# Patient Record
Sex: FEMALE | Race: WHITE | NOT HISPANIC OR LATINO | Employment: OTHER | URBAN - METROPOLITAN AREA
[De-identification: names, ages, dates, MRNs, and addresses within clinical notes are randomized per-mention and may not be internally consistent; named-entity substitution may affect disease eponyms.]

---

## 2020-01-28 ENCOUNTER — OFFICE VISIT (OUTPATIENT)
Dept: INTERNAL MEDICINE CLINIC | Facility: CLINIC | Age: 75
End: 2020-01-28
Payer: MEDICARE

## 2020-01-28 VITALS
SYSTOLIC BLOOD PRESSURE: 140 MMHG | WEIGHT: 128.4 LBS | DIASTOLIC BLOOD PRESSURE: 82 MMHG | HEIGHT: 59 IN | BODY MASS INDEX: 25.88 KG/M2 | TEMPERATURE: 98.5 F | OXYGEN SATURATION: 95 % | HEART RATE: 64 BPM

## 2020-01-28 DIAGNOSIS — M79.7 FIBROMYALGIA: ICD-10-CM

## 2020-01-28 DIAGNOSIS — Z90.710 H/O: HYSTERECTOMY: ICD-10-CM

## 2020-01-28 DIAGNOSIS — H40.9 GLAUCOMA, UNSPECIFIED GLAUCOMA TYPE, UNSPECIFIED LATERALITY: ICD-10-CM

## 2020-01-28 DIAGNOSIS — I10 ESSENTIAL HYPERTENSION: ICD-10-CM

## 2020-01-28 DIAGNOSIS — K27.9 PUD (PEPTIC ULCER DISEASE): ICD-10-CM

## 2020-01-28 DIAGNOSIS — I65.21 STENOSIS OF RIGHT CAROTID ARTERY: ICD-10-CM

## 2020-01-28 DIAGNOSIS — K21.9 GASTROESOPHAGEAL REFLUX DISEASE WITHOUT ESOPHAGITIS: Primary | ICD-10-CM

## 2020-01-28 DIAGNOSIS — M06.9 RHEUMATOID ARTHRITIS, INVOLVING UNSPECIFIED SITE, UNSPECIFIED RHEUMATOID FACTOR PRESENCE: ICD-10-CM

## 2020-01-28 DIAGNOSIS — F41.9 ANXIETY: ICD-10-CM

## 2020-01-28 DIAGNOSIS — F34.1 DYSTHYMIA: ICD-10-CM

## 2020-01-28 DIAGNOSIS — K86.9 PANCREATIC LESION: ICD-10-CM

## 2020-01-28 PROBLEM — N18.30 CHRONIC RENAL INSUFFICIENCY, STAGE III (MODERATE) (HCC): Status: ACTIVE | Noted: 2019-06-10

## 2020-01-28 PROBLEM — H35.30 MACULAR DEGENERATION: Status: ACTIVE | Noted: 2019-05-28

## 2020-01-28 PROBLEM — M17.10 OSTEOARTHRITIS OF KNEE: Status: ACTIVE | Noted: 2020-01-28

## 2020-01-28 PROBLEM — R25.1 TREMOR: Status: RESOLVED | Noted: 2019-05-02 | Resolved: 2020-01-28

## 2020-01-28 PROBLEM — M47.817 LUMBOSACRAL SPONDYLOSIS WITHOUT MYELOPATHY: Status: ACTIVE | Noted: 2020-01-28

## 2020-01-28 PROBLEM — M17.9 OSTEOARTHRITIS OF KNEE: Status: ACTIVE | Noted: 2020-01-28

## 2020-01-28 PROBLEM — R25.1 TREMOR: Status: ACTIVE | Noted: 2019-05-02

## 2020-01-28 PROBLEM — G25.81 RESTLESS LEGS: Status: ACTIVE | Noted: 2019-05-02

## 2020-01-28 PROBLEM — M16.9 OSTEOARTHRITIS OF HIP: Status: ACTIVE | Noted: 2020-01-28

## 2020-01-28 PROBLEM — H81.13 BENIGN PAROXYSMAL POSITIONAL VERTIGO DUE TO BILATERAL VESTIBULAR DISORDER: Status: ACTIVE | Noted: 2020-01-28

## 2020-01-28 PROBLEM — E78.5 HYPERLIPIDEMIA: Status: ACTIVE | Noted: 2019-05-02

## 2020-01-28 PROBLEM — M81.0 OSTEOPOROSIS: Status: ACTIVE | Noted: 2019-05-28

## 2020-01-28 PROCEDURE — 99204 OFFICE O/P NEW MOD 45 MIN: CPT | Performed by: INTERNAL MEDICINE

## 2020-01-28 RX ORDER — BENAZEPRIL HYDROCHLORIDE 40 MG/1
1 TABLET, FILM COATED ORAL DAILY
COMMUNITY
End: 2020-07-20 | Stop reason: SDUPTHER

## 2020-01-28 RX ORDER — ESCITALOPRAM OXALATE 10 MG/1
1 TABLET ORAL DAILY
COMMUNITY
Start: 2019-11-21 | End: 2020-01-28 | Stop reason: SDUPTHER

## 2020-01-28 RX ORDER — CHLORTHALIDONE 25 MG/1
1 TABLET ORAL DAILY
COMMUNITY
Start: 2019-12-31 | End: 2020-01-28 | Stop reason: SDUPTHER

## 2020-01-28 RX ORDER — METOPROLOL SUCCINATE 50 MG/1
50 TABLET, EXTENDED RELEASE ORAL DAILY
Qty: 90 TABLET | Refills: 1 | Status: SHIPPED | OUTPATIENT
Start: 2020-01-28 | End: 2020-08-18 | Stop reason: SDUPTHER

## 2020-01-28 RX ORDER — CHLORTHALIDONE 25 MG/1
25 TABLET ORAL DAILY
Qty: 90 TABLET | Refills: 1 | Status: SHIPPED | OUTPATIENT
Start: 2020-01-28 | End: 2020-03-31

## 2020-01-28 RX ORDER — UBIDECARENONE 75 MG
1 CAPSULE ORAL DAILY
COMMUNITY
End: 2021-07-07

## 2020-01-28 RX ORDER — ESCITALOPRAM OXALATE 10 MG/1
10 TABLET ORAL DAILY
Qty: 90 TABLET | Refills: 1 | Status: SHIPPED | OUTPATIENT
Start: 2020-01-28 | End: 2020-04-22

## 2020-01-28 RX ORDER — LATANOPROST 50 UG/ML
1 SOLUTION/ DROPS OPHTHALMIC DAILY
COMMUNITY

## 2020-01-28 RX ORDER — METOPROLOL SUCCINATE 50 MG/1
50 TABLET, EXTENDED RELEASE ORAL DAILY
COMMUNITY
End: 2020-01-28 | Stop reason: SDUPTHER

## 2020-01-28 NOTE — ASSESSMENT & PLAN NOTE
Currently stable  Previously evaluated by rheumatology however is no longer on any medications for management of fibromyalgia

## 2020-01-28 NOTE — PROGRESS NOTES
Assessment/Plan:    Dysthymia  Controlled  Continue Lexapro 10 mg daily  Fibromyalgia  Currently stable  Previously evaluated by rheumatology however is no longer on any medications for management of fibromyalgia  Gastroesophageal reflux disease without esophagitis  Currently not on any PPi therapy  Hypertensive disorder  Well controlled  Continue benazepril, chlorthalidone, and metoprolol  Stenosis of right carotid artery  Last carotid duplex was October 2019  Diagnoses and all orders for this visit:    Gastroesophageal reflux disease without esophagitis    PUD (peptic ulcer disease)    Anxiety  -     escitalopram (LEXAPRO) 10 mg tablet; Take 1 tablet (10 mg total) by mouth daily    Dysthymia  -     escitalopram (LEXAPRO) 10 mg tablet; Take 1 tablet (10 mg total) by mouth daily    Essential hypertension  -     Comprehensive metabolic panel; Future  -     CBC; Future  -     chlorthalidone 25 mg tablet; Take 1 tablet (25 mg total) by mouth daily  -     metoprolol succinate (TOPROL-XL) 50 mg 24 hr tablet; Take 1 tablet (50 mg total) by mouth daily    Pancreatic lesion  -     Ambulatory referral to Gastroenterology; Future  -     Comprehensive metabolic panel; Future  -     CBC; Future  -     Lipase; Future    Rheumatoid arthritis, involving unspecified site, unspecified rheumatoid factor presence ([facility] [address] )  -     Cancel: Ambulatory referral to Rheumatology; Future    Fibromyalgia  -     Cancel: Ambulatory referral to Rheumatology; Future    Glaucoma, unspecified glaucoma type, unspecified laterality  -     Ambulatory referral to Ophthalmology; Future    H/O: hysterectomy  -     Ambulatory referral to Gynecology; Future    Stenosis of right carotid artery    Other orders  -     aspirin 81 MG tablet; Take 1 tablet by mouth daily  -     Calcium Carb-Cholecalciferol (CALCIUM 600 + D PO); Take 1 tablet by mouth daily  -     Coenzyme Q10 (CO Q-10) 200 MG CAPS;  Take 1 tablet by mouth daily  - latanoprost (XALATAN) 0 005 % ophthalmic solution; Administer 1 drop to both eyes daily  -     benazepril (LOTENSIN) 40 MG tablet; Take 1 tablet by mouth daily  -     Discontinue: chlorthalidone 25 mg tablet; Take 1 tablet by mouth daily  -     Discontinue: escitalopram (LEXAPRO) 10 mg tablet; Take 1 tablet by mouth daily  -     Multiple Vitamins-Minerals (PRESERVISION/LUTEIN PO); PreserVision Lutein  -     Discontinue: metoprolol succinate (TOPROL-XL) 50 mg 24 hr tablet; Take 50 mg by mouth daily          BMI Counseling: Body mass index is 25 93 kg/m²  The BMI is above normal  Nutrition recommendations include decreasing portion sizes, encouraging healthy choices of fruits and vegetables, decreasing fast food intake, consuming healthier snacks, limiting drinks that contain sugar, moderation in carbohydrate intake, increasing intake of lean protein, reducing intake of saturated and trans fat and reducing intake of cholesterol  Exercise recommendations include moderate physical activity 150 minutes/week and exercising 3-5 times per week  No pharmacotherapy was ordered  Patient referred to PCP due to patient being overweight  Depression Screening and Follow-up Plan: Patient's depression screening was positive with a PHQ-2 score of 0  Clincally patient does not have depression  No treatment is required  Falls Plan of Care: balance, strength, and gait training instructions were provided  Time spent during encounter: 30 minutes (counseling, reviewing medications, and discussing treatment and plan)    Subjective:      Patient ID: Taiwo Berrios is a 66 y o  female  Chief Complaint   Patient presents with    [facility]     New patient appointment     Other     BMI due PHQ (-)       49-year-old female is seen today to establish care    She has a reported past medical history of GERD, peptic ulcer disease, anxiety, depression, history of left breast cancer status post bilateral mastectomy and chemotherapy (2007), osteoporosis, hypertension, hyperlipidemia, chronic kidney disease, hematologic, severe scoliosis, drug-induced restless leg syndrome, and osteoarthritis  She recently moved from [place]  Other than her primary care physician, she used to see pain management, ophthalmologist, neurologist, cardiologist, gynecologist, nephrologist, orthopedic surgery, Gastroenterology (pancreatic lesion), and Oncology  She is up to date with colorectal, breast, and cervical cancer screening  She has no active complaints or concerns at this time  She has been compliant with her medication regimen  She follows a well balanced diet  She is a former smoker (social smoker for 7 years, quit 1979), denies alcohol or illicit drug use  The following portions of the patient's history were reviewed and updated as appropriate: allergies, current medications, past family history, past medical history, past social history, past surgical history and problem list     Review of Systems   Constitutional: Negative for activity change, appetite change, chills, diaphoresis, fatigue and fever  HENT: Negative for congestion, postnasal drip, rhinorrhea, sinus pressure, sinus pain, sneezing and sore throat  Eyes: Negative for visual disturbance  Respiratory: Negative for apnea, cough, choking, chest tightness, shortness of breath and wheezing  Cardiovascular: Negative for chest pain, palpitations and leg swelling  Gastrointestinal: Negative for abdominal distention, abdominal pain, anal bleeding, blood in stool, constipation, diarrhea, nausea and vomiting  Endocrine: Negative for cold intolerance and heat intolerance  Genitourinary: Negative for difficulty urinating, dysuria and hematuria  Musculoskeletal: Negative  Skin: Negative  Neurological: Negative for dizziness, weakness, light-headedness, numbness and headaches  Hematological: Negative for adenopathy     Psychiatric/Behavioral: Negative for agitation, sleep disturbance and suicidal ideas  All other systems reviewed and are negative  History reviewed  No pertinent past medical history  Current Outpatient Medications:     aspirin 81 MG tablet, Take 1 tablet by mouth daily, Disp: , Rfl:     benazepril (LOTENSIN) 40 MG tablet, Take 1 tablet by mouth daily, Disp: , Rfl:     Calcium Carb-Cholecalciferol (CALCIUM 600 + D PO), Take 1 tablet by mouth daily, Disp: , Rfl:     chlorthalidone 25 mg tablet, Take 1 tablet (25 mg total) by mouth daily, Disp: 90 tablet, Rfl: 1    Coenzyme Q10 (CO Q-10) 200 MG CAPS, Take 1 tablet by mouth daily, Disp: , Rfl:     escitalopram (LEXAPRO) 10 mg tablet, Take 1 tablet (10 mg total) by mouth daily, Disp: 90 tablet, Rfl: 1    latanoprost (XALATAN) 0 005 % ophthalmic solution, Administer 1 drop to both eyes daily, Disp: , Rfl:     metoprolol succinate (TOPROL-XL) 50 mg 24 hr tablet, Take 1 tablet (50 mg total) by mouth daily, Disp: 90 tablet, Rfl: 1    Multiple Vitamins-Minerals (PRESERVISION/LUTEIN PO), PreserVision Lutein, Disp: , Rfl:     Allergies   Allergen Reactions    Amoxicillin-Pot Clavulanate Diarrhea    Carbidopa     Cefdinir Diarrhea    Celecoxib Diarrhea    Clindamycin Diarrhea    Denosumab     Dorzolamide Hcl-Timolol Mal     Ibandronic Acid     Lorazepam Hallucinations    Milnacipran     Ropinirole Headache    Rotigotine Rash       Social History   Past Surgical History:   Procedure Laterality Date    BREAST BIOPSY      BREAST SURGERY      GANGLION CYST EXCISION Left     wrist    HIP SURGERY      HYSTERECTOMY      MASTECTOMY Bilateral     PLANTAR FASCIA SURGERY Bilateral 1990    REFRACTIVE SURGERY      ROTATOR CUFF REPAIR Bilateral 2004 2005    TUBAL LIGATION       History reviewed  No pertinent family history      Objective:  /82 (BP Location: Right arm, Patient Position: Sitting, Cuff Size: Adult)   Pulse 64   Temp 98 5 °F (36 9 °C) (Oral)   Ht 4' 11" (1 499 m)   Wt 58 2 kg (128 lb 6 4 oz)   SpO2 95%   BMI 25 93 kg/m²     No results found for this or any previous visit (from the past 1344 hour(s))  Physical Exam   Constitutional: She is oriented to person, place, and time  She appears well-developed and well-nourished  No distress  HENT:   Head: Normocephalic and atraumatic  Eyes: Pupils are equal, round, and reactive to light  Conjunctivae and EOM are normal  Right eye exhibits no discharge  Left eye exhibits no discharge  Neck: Normal range of motion  Neck supple  No JVD present  No thyromegaly present  Cardiovascular: Normal rate, regular rhythm, normal heart sounds and intact distal pulses  Exam reveals no gallop and no friction rub  No murmur heard  Pulmonary/Chest: Effort normal and breath sounds normal  No respiratory distress  She has no wheezes  She has no rales  She exhibits no tenderness  Abdominal: Soft  She exhibits no distension  There is no tenderness  Musculoskeletal: Normal range of motion  She exhibits no edema, tenderness or deformity  Lymphadenopathy:     She has no cervical adenopathy  Neurological: She is alert and oriented to person, place, and time  No cranial nerve deficit  Coordination normal    Skin: Skin is warm and dry  No rash noted  She is not diaphoretic  No erythema  No pallor  Psychiatric: She has a normal mood and affect  Her behavior is normal  Judgment and thought content normal    Nursing note and vitals reviewed

## 2020-01-30 ENCOUNTER — CLINICAL SUPPORT (OUTPATIENT)
Dept: INTERNAL MEDICINE CLINIC | Facility: CLINIC | Age: 75
End: 2020-01-30
Payer: MEDICARE

## 2020-01-30 DIAGNOSIS — I10 ESSENTIAL HYPERTENSION: Primary | ICD-10-CM

## 2020-01-30 DIAGNOSIS — K86.9 PANCREATIC LESION: ICD-10-CM

## 2020-01-30 LAB
ALBUMIN SERPL BCP-MCNC: 3.8 G/DL (ref 3.5–5)
ALP SERPL-CCNC: 106 U/L (ref 46–116)
ALT SERPL W P-5'-P-CCNC: 40 U/L (ref 12–78)
ANION GAP SERPL CALCULATED.3IONS-SCNC: 4 MMOL/L (ref 4–13)
AST SERPL W P-5'-P-CCNC: 19 U/L (ref 5–45)
BILIRUB SERPL-MCNC: 0.42 MG/DL (ref 0.2–1)
BUN SERPL-MCNC: 24 MG/DL (ref 5–25)
CALCIUM SERPL-MCNC: 9.5 MG/DL (ref 8.3–10.1)
CHLORIDE SERPL-SCNC: 107 MMOL/L (ref 100–108)
CO2 SERPL-SCNC: 29 MMOL/L (ref 21–32)
CREAT SERPL-MCNC: 1.02 MG/DL (ref 0.6–1.3)
ERYTHROCYTE [DISTWIDTH] IN BLOOD BY AUTOMATED COUNT: 13.2 % (ref 11.6–15.1)
GFR SERPL CREATININE-BSD FRML MDRD: 54 ML/MIN/1.73SQ M
GLUCOSE P FAST SERPL-MCNC: 86 MG/DL (ref 65–99)
HCT VFR BLD AUTO: 38.4 % (ref 34.8–46.1)
HGB BLD-MCNC: 11.6 G/DL (ref 11.5–15.4)
LIPASE SERPL-CCNC: 206 U/L (ref 73–393)
MCH RBC QN AUTO: 28.9 PG (ref 26.8–34.3)
MCHC RBC AUTO-ENTMCNC: 30.2 G/DL (ref 31.4–37.4)
MCV RBC AUTO: 96 FL (ref 82–98)
PLATELET # BLD AUTO: 303 THOUSANDS/UL (ref 149–390)
PMV BLD AUTO: 11 FL (ref 8.9–12.7)
POTASSIUM SERPL-SCNC: 4.9 MMOL/L (ref 3.5–5.3)
PROT SERPL-MCNC: 6.8 G/DL (ref 6.4–8.2)
RBC # BLD AUTO: 4.02 MILLION/UL (ref 3.81–5.12)
SODIUM SERPL-SCNC: 140 MMOL/L (ref 136–145)
WBC # BLD AUTO: 5.14 THOUSAND/UL (ref 4.31–10.16)

## 2020-01-30 PROCEDURE — 85027 COMPLETE CBC AUTOMATED: CPT | Performed by: INTERNAL MEDICINE

## 2020-01-30 PROCEDURE — 83690 ASSAY OF LIPASE: CPT | Performed by: INTERNAL MEDICINE

## 2020-01-30 PROCEDURE — 36415 COLL VENOUS BLD VENIPUNCTURE: CPT

## 2020-01-30 PROCEDURE — 80053 COMPREHEN METABOLIC PANEL: CPT | Performed by: INTERNAL MEDICINE

## 2020-02-18 ENCOUNTER — OFFICE VISIT (OUTPATIENT)
Dept: INTERNAL MEDICINE CLINIC | Facility: CLINIC | Age: 75
End: 2020-02-18
Payer: MEDICARE

## 2020-02-18 VITALS
SYSTOLIC BLOOD PRESSURE: 126 MMHG | TEMPERATURE: 98.2 F | BODY MASS INDEX: 26 KG/M2 | HEART RATE: 75 BPM | DIASTOLIC BLOOD PRESSURE: 60 MMHG | WEIGHT: 129 LBS | HEIGHT: 59 IN | OXYGEN SATURATION: 95 %

## 2020-02-18 DIAGNOSIS — J06.9 URI, ACUTE: Primary | ICD-10-CM

## 2020-02-18 PROCEDURE — 1036F TOBACCO NON-USER: CPT | Performed by: INTERNAL MEDICINE

## 2020-02-18 PROCEDURE — 99213 OFFICE O/P EST LOW 20 MIN: CPT | Performed by: INTERNAL MEDICINE

## 2020-02-18 PROCEDURE — 3074F SYST BP LT 130 MM HG: CPT | Performed by: INTERNAL MEDICINE

## 2020-02-18 PROCEDURE — 1160F RVW MEDS BY RX/DR IN RCRD: CPT | Performed by: INTERNAL MEDICINE

## 2020-02-18 PROCEDURE — 3008F BODY MASS INDEX DOCD: CPT | Performed by: INTERNAL MEDICINE

## 2020-02-18 PROCEDURE — 3078F DIAST BP <80 MM HG: CPT | Performed by: INTERNAL MEDICINE

## 2020-02-18 RX ORDER — BENZONATATE 100 MG/1
100 CAPSULE ORAL 3 TIMES DAILY PRN
Qty: 20 CAPSULE | Refills: 0 | Status: SHIPPED | OUTPATIENT
Start: 2020-02-18 | End: 2020-03-31

## 2020-02-18 RX ORDER — AZITHROMYCIN 250 MG/1
TABLET, FILM COATED ORAL
Qty: 6 TABLET | Refills: 0 | Status: SHIPPED | OUTPATIENT
Start: 2020-02-18 | End: 2020-02-22

## 2020-02-18 NOTE — PROGRESS NOTES
Assessment/Plan:    URI, acute  Will prescribe azithromycin 500 mg today and 250 mg daily for the next 4 days  Will prescribe benzonatate every 8 hours as needed for cough  Continue over-the-counter decongestants  She is to contact office for worsening symptoms  Diagnoses and all orders for this visit:    URI, acute  -     benzonatate (TESSALON PERLES) 100 mg capsule; Take 1 capsule (100 mg total) by mouth 3 (three) times a day as needed for cough  -     azithromycin (ZITHROMAX) 250 mg tablet; Take 2 tablets today then 1 tablet daily x 4 days                Time spent during encounter: 15 minutes (counseling, reviewing medications, and discussing treatment and plan)    Subjective:      Patient ID: Rowena Lesch is a 66 y o  female  Chief Complaint   Patient presents with    Cough     Patient is here c/o cough, nasal and chest congestion, sore throat and ear pressure  Sx are going on since Saturday  Sx are getting worse  Pt needs to schd  AWV       28-year-old female is seen today with acute URI symptoms since 4 days  URI    This is a new problem  The current episode started in the past 7 days  The problem has been gradually worsening  There has been no fever  Associated symptoms include congestion, coughing, a plugged ear sensation, rhinorrhea, sinus pain and a sore throat  Pertinent negatives include no abdominal pain, chest pain, diarrhea, dysuria, ear pain, headaches, joint pain, joint swelling, nausea, neck pain, rash, sneezing, swollen glands, vomiting or wheezing  She has tried decongestant for the symptoms  The treatment provided mild relief         The following portions of the patient's history were reviewed and updated as appropriate: allergies, current medications, past family history, past medical history, past social history, past surgical history and problem list     Review of Systems   Constitutional: Negative for activity change, appetite change, chills, diaphoresis, fatigue and fever    HENT: Positive for congestion, rhinorrhea, sinus pain and sore throat  Negative for ear pain, postnasal drip, sinus pressure and sneezing  Eyes: Negative for visual disturbance  Respiratory: Positive for cough  Negative for apnea, choking, chest tightness, shortness of breath and wheezing  Cardiovascular: Negative for chest pain, palpitations and leg swelling  Gastrointestinal: Negative for abdominal distention, abdominal pain, anal bleeding, blood in stool, constipation, diarrhea, nausea and vomiting  Endocrine: Negative for cold intolerance and heat intolerance  Genitourinary: Negative for difficulty urinating, dysuria and hematuria  Musculoskeletal: Negative  Negative for joint pain and neck pain  Skin: Negative  Negative for rash  Neurological: Negative for dizziness, weakness, light-headedness, numbness and headaches  Hematological: Negative for adenopathy  Psychiatric/Behavioral: Negative for agitation, sleep disturbance and suicidal ideas  All other systems reviewed and are negative  History reviewed  No pertinent past medical history        Current Outpatient Medications:     aspirin 81 MG tablet, Take 1 tablet by mouth daily, Disp: , Rfl:     benazepril (LOTENSIN) 40 MG tablet, Take 1 tablet by mouth daily, Disp: , Rfl:     Calcium Carb-Cholecalciferol (CALCIUM 600 + D PO), Take 1 tablet by mouth daily, Disp: , Rfl:     chlorthalidone 25 mg tablet, Take 1 tablet (25 mg total) by mouth daily, Disp: 90 tablet, Rfl: 1    Coenzyme Q10 (CO Q-10) 200 MG CAPS, Take 1 tablet by mouth daily, Disp: , Rfl:     escitalopram (LEXAPRO) 10 mg tablet, Take 1 tablet (10 mg total) by mouth daily, Disp: 90 tablet, Rfl: 1    latanoprost (XALATAN) 0 005 % ophthalmic solution, Administer 1 drop to both eyes daily, Disp: , Rfl:     metoprolol succinate (TOPROL-XL) 50 mg 24 hr tablet, Take 1 tablet (50 mg total) by mouth daily, Disp: 90 tablet, Rfl: 1    Multiple Vitamins-Minerals (PRESERVISION/LUTEIN PO), PreserVision Lutein, Disp: , Rfl:     azithromycin (ZITHROMAX) 250 mg tablet, Take 2 tablets today then 1 tablet daily x 4 days, Disp: 6 tablet, Rfl: 0    benzonatate (TESSALON PERLES) 100 mg capsule, Take 1 capsule (100 mg total) by mouth 3 (three) times a day as needed for cough, Disp: 20 capsule, Rfl: 0    Allergies   Allergen Reactions    Dorzolamide Hcl-Timolol Mal Other (See Comments)    Gabapentin Other (See Comments)    Lorazepam Hallucinations    Amlodipine Itching    Amoxicillin-Pot Clavulanate Diarrhea    Buprenorphine Hcl Hallucinations    Carbidopa     Cefdinir Diarrhea    Celecoxib Diarrhea    Clindamycin Diarrhea    Denosumab     Hydrocodone Hallucinations    Hydrocodone-Acetaminophen     Ibandronic Acid     Levodopa     Milnacipran     Pregabalin     Ropinirole Headache    Rosuvastatin Itching    Tramadol Nausea Only    Rotigotine Rash       Social History   Past Surgical History:   Procedure Laterality Date    BREAST BIOPSY      BREAST SURGERY      GANGLION CYST EXCISION Left     wrist    HIP SURGERY      HYSTERECTOMY      MASTECTOMY Bilateral     PLANTAR FASCIA SURGERY Bilateral 1990    REFRACTIVE SURGERY      ROTATOR CUFF REPAIR Bilateral 2004 2005    TUBAL LIGATION       History reviewed  No pertinent family history      Objective:  /60 (BP Location: Left arm, Patient Position: Sitting, Cuff Size: Standard)   Pulse 75   Temp 98 2 °F (36 8 °C) (Oral)   Ht 4' 11" (1 499 m)   Wt 58 5 kg (129 lb)   SpO2 95%   BMI 26 05 kg/m²     Recent Results (from the past 1344 hour(s))   Lipase    Collection Time: 01/30/20  9:33 AM   Result Value Ref Range    Lipase 206 73 - 393 u/L   CBC    Collection Time: 01/30/20  9:33 AM   Result Value Ref Range    WBC 5 14 4 31 - 10 16 Thousand/uL    RBC 4 02 3 81 - 5 12 Million/uL    Hemoglobin 11 6 11 5 - 15 4 g/dL    Hematocrit 38 4 34 8 - 46 1 %    MCV 96 82 - 98 fL    MCH 28 9 26 8 - 34 3 pg    MCHC 30 2 (L) 31 4 - 37 4 g/dL    RDW 13 2 11 6 - 15 1 %    Platelets 475 950 - 507 Thousands/uL    MPV 11 0 8 9 - 12 7 fL   Comprehensive metabolic panel    Collection Time: 01/30/20  9:33 AM   Result Value Ref Range    Sodium 140 136 - 145 mmol/L    Potassium 4 9 3 5 - 5 3 mmol/L    Chloride 107 100 - 108 mmol/L    CO2 29 21 - 32 mmol/L    ANION GAP 4 4 - 13 mmol/L    BUN 24 5 - 25 mg/dL    Creatinine 1 02 0 60 - 1 30 mg/dL    Glucose, Fasting 86 65 - 99 mg/dL    Calcium 9 5 8 3 - 10 1 mg/dL    AST 19 5 - 45 U/L    ALT 40 12 - 78 U/L    Alkaline Phosphatase 106 46 - 116 U/L    Total Protein 6 8 6 4 - 8 2 g/dL    Albumin 3 8 3 5 - 5 0 g/dL    Total Bilirubin 0 42 0 20 - 1 00 mg/dL    eGFR 54 ml/min/1 73sq m            Physical Exam   Constitutional: She is oriented to person, place, and time  She appears well-developed and well-nourished  She appears ill  No distress  HENT:   Head: Normocephalic and atraumatic  Eyes: Pupils are equal, round, and reactive to light  Conjunctivae and EOM are normal  Right eye exhibits no discharge  Left eye exhibits no discharge  Neck: Normal range of motion  Neck supple  No JVD present  No thyromegaly present  Cardiovascular: Normal rate, regular rhythm, normal heart sounds and intact distal pulses  Exam reveals no gallop and no friction rub  No murmur heard  Pulmonary/Chest: Effort normal and breath sounds normal  No respiratory distress  She has no wheezes  She has no rales  She exhibits no tenderness  +productive cough   Abdominal: Soft  She exhibits no distension  There is no tenderness  Musculoskeletal: Normal range of motion  She exhibits no edema, tenderness or deformity  Lymphadenopathy:     She has no cervical adenopathy  Neurological: She is alert and oriented to person, place, and time  No cranial nerve deficit  Coordination normal    Skin: Skin is warm and dry  No rash noted  She is not diaphoretic  No erythema  No pallor     Psychiatric: She has a normal mood and affect  Her behavior is normal  Judgment and thought content normal    Nursing note and vitals reviewed

## 2020-02-18 NOTE — ASSESSMENT & PLAN NOTE
Will prescribe azithromycin 500 mg today and 250 mg daily for the next 4 days  Will prescribe benzonatate every 8 hours as needed for cough  Continue over-the-counter decongestants  She is to contact office for worsening symptoms

## 2020-02-27 ENCOUNTER — ANNUAL EXAM (OUTPATIENT)
Dept: OBGYN CLINIC | Facility: CLINIC | Age: 75
End: 2020-02-27
Payer: MEDICARE

## 2020-02-27 VITALS
HEIGHT: 59 IN | WEIGHT: 129.6 LBS | DIASTOLIC BLOOD PRESSURE: 78 MMHG | BODY MASS INDEX: 26.13 KG/M2 | SYSTOLIC BLOOD PRESSURE: 132 MMHG

## 2020-02-27 DIAGNOSIS — Z01.419 ENCOUNTER FOR ANNUAL ROUTINE GYNECOLOGICAL EXAMINATION: Primary | ICD-10-CM

## 2020-02-27 DIAGNOSIS — Z12.39 BREAST CANCER SCREENING: ICD-10-CM

## 2020-02-27 PROCEDURE — G0101 CA SCREEN;PELVIC/BREAST EXAM: HCPCS | Performed by: OBSTETRICS & GYNECOLOGY

## 2020-02-27 NOTE — PROGRESS NOTES
Assessment/Plan:  Pap smear deferred due to low risk status  Encouraged self-breast examination as well as calcium supplementation  Continue follow-up with breast surgeon  She has been having annual breast MRI  She will continue to follow-up with her primary care physician as scheduled  Reviewed colon cancer screening, colonoscopy 2019 within normal limits  Return to office in 2 years per Medicare recommendations/protocol  No problem-specific Assessment & Plan notes found for this encounter  Diagnoses and all orders for this visit:    Encounter for annual routine gynecological examination    Breast cancer screening    Other orders  -     Cancel: Mammo screening bilateral w 3d & cad; Future  -     MELATONIN PO; Take by mouth          Subjective:      Patient ID: Venkat Hudson is a 66 y o  female  HPI     This is a very pleasant 66-year-old female  ( x3) presents as a new patient for annual well gyn exam   Her gyn history significant for hysterectomy abdominal with right salpingo-oophorectomy in  due to dysfunctional uterine bleeding  Patient has never been on hormone replacement therapy  She was diagnosed with breast cancer in   Her treatment included bilateral mastectomy followed by chemotherapy  This was done in [place]  She also had reconstructive breast surgery  In 2017 she had her right breast implant replaced due to rupture  She has been having annual breast MRIs done through her plastic surgeon  Patient denies any vaginal bleeding or spotting  She is not sexually active  She has been  for approximately 2 years  She does follow up with her family physician every 3-6 months    She did have a colonoscopy  which was normal     The following portions of the patient's history were reviewed and updated as appropriate: allergies, current medications, past family history, past medical history, past social history, past surgical history and problem list     Review of Systems   Constitutional: Negative for fatigue, fever and unexpected weight change  Respiratory: Negative for cough, chest tightness, shortness of breath and wheezing  Cardiovascular: Negative  Negative for chest pain and palpitations  Gastrointestinal: Negative  Negative for abdominal distention, abdominal pain, blood in stool, constipation, diarrhea, nausea and vomiting  Genitourinary: Negative  Negative for difficulty urinating, dyspareunia, dysuria, flank pain, frequency, genital sores, hematuria, pelvic pain, urgency, vaginal bleeding, vaginal discharge and vaginal pain  Skin: Negative for rash  Objective:      /78   Ht 4' 11" (1 499 m)   Wt 58 8 kg (129 lb 9 6 oz)   Breastfeeding No   BMI 26 18 kg/m²          Physical Exam   Constitutional: She appears well-developed and well-nourished  Cardiovascular: Normal rate and regular rhythm  Pulmonary/Chest: Effort normal and breath sounds normal  Right breast exhibits no inverted nipple, no mass, no nipple discharge, no skin change and no tenderness  Left breast exhibits no inverted nipple, no mass, no nipple discharge, no skin change and no tenderness  Abdominal: Soft  Bowel sounds are normal  She exhibits no distension  There is no tenderness  There is no rebound and no guarding  Genitourinary: Vagina normal  There is no lesion on the right labia  There is no lesion on the left labia  Cervix exhibits no discharge and no friability  Right adnexum displays no mass, no tenderness and no fullness  Left adnexum displays no mass, no tenderness and no fullness  No vaginal discharge found  Genitourinary Comments: The vagina is evident of estrogen deficiency  The cervix is surgically absent  The cuff is well-supported  Rectovaginal exam is confirmatory

## 2020-03-16 ENCOUNTER — TELEPHONE (OUTPATIENT)
Dept: INTERNAL MEDICINE CLINIC | Facility: CLINIC | Age: 75
End: 2020-03-16

## 2020-03-16 ENCOUNTER — CONSULT (OUTPATIENT)
Dept: GASTROENTEROLOGY | Facility: MEDICAL CENTER | Age: 75
End: 2020-03-16
Payer: MEDICARE

## 2020-03-16 ENCOUNTER — APPOINTMENT (OUTPATIENT)
Dept: LAB | Facility: MEDICAL CENTER | Age: 75
End: 2020-03-16
Attending: INTERNAL MEDICINE
Payer: MEDICARE

## 2020-03-16 VITALS
BODY MASS INDEX: 26.21 KG/M2 | SYSTOLIC BLOOD PRESSURE: 116 MMHG | DIASTOLIC BLOOD PRESSURE: 74 MMHG | WEIGHT: 130 LBS | TEMPERATURE: 97.2 F | HEIGHT: 59 IN

## 2020-03-16 DIAGNOSIS — K86.9 PANCREATIC LESION: ICD-10-CM

## 2020-03-16 DIAGNOSIS — R19.7 DIARRHEA DUE TO MALABSORPTION: Primary | ICD-10-CM

## 2020-03-16 DIAGNOSIS — K90.9 DIARRHEA DUE TO MALABSORPTION: Primary | ICD-10-CM

## 2020-03-16 LAB
ANION GAP SERPL CALCULATED.3IONS-SCNC: 6 MMOL/L (ref 4–13)
BUN SERPL-MCNC: 24 MG/DL (ref 5–25)
CALCIUM SERPL-MCNC: 9.4 MG/DL (ref 8.3–10.1)
CHLORIDE SERPL-SCNC: 105 MMOL/L (ref 100–108)
CO2 SERPL-SCNC: 29 MMOL/L (ref 21–32)
CREAT SERPL-MCNC: 1.19 MG/DL (ref 0.6–1.3)
GFR SERPL CREATININE-BSD FRML MDRD: 44 ML/MIN/1.73SQ M
GLUCOSE SERPL-MCNC: 91 MG/DL (ref 65–140)
POTASSIUM SERPL-SCNC: 4.1 MMOL/L (ref 3.5–5.3)
SODIUM SERPL-SCNC: 140 MMOL/L (ref 136–145)

## 2020-03-16 PROCEDURE — 99204 OFFICE O/P NEW MOD 45 MIN: CPT | Performed by: INTERNAL MEDICINE

## 2020-03-16 PROCEDURE — 36415 COLL VENOUS BLD VENIPUNCTURE: CPT

## 2020-03-16 PROCEDURE — 80048 BASIC METABOLIC PNL TOTAL CA: CPT

## 2020-03-16 NOTE — TELEPHONE ENCOUNTER
Patient has upcoming dentist shirley on Thursday, she states she needs antibiotics before shirley  She is requesting keflex 500 mg 4 pills 1 hour prior to dentist  Is this something you are able to order  Or do you want her to schedule  ? She is requesting enough for a few visits  States she will be going back for a filling too     She uses [facility]

## 2020-03-16 NOTE — PROGRESS NOTES
[facility] - Outpatient Consultation  Juliana Vogt 66 y o  female MRN: [id number]  Encounter: [id number]          ASSESSMENT AND PLAN:      1  Pancreatic lesion  Patient's previous visit with another gastroenterologist in [place] included for detailed EUS and colonoscopy report but no further evaluation on the gastric fistular or discussion on etiology of chronic diarrhea  No further discussion on chronic pancreatitis either  Patient reported she was never notice jessica the etiology of her pancreatitis  Plan to do an MRI with IV contrast and MRCP to further evaluate pancreatic cyst and gastric fistulae  Patient is likely to require a repeat EUS  However with Covid 19 outbreak, we will plan for this after MRI and decide the timing      - MRI abdomen w wo contrast and mrcp; Future  - Basic metabolic panel; Future    2  Diarrhea due to malabsorption  Labs to rule out infectious etiology  Rule out pancreatic insufficiency  It could be functional as patient reported this has been chronic     - Clostridium difficile toxin by PCR; Future  - Pancreatic elastase, fecal; Future  - Calprotectin,Fecal; Future  - Ova and parasite examination; Future  - Stool Enteric Bacterial Panel by PCR; Future      Follow-up with Dr Dorota Webber  ______________________________________________________________________    HPI:  24-year-old female recently moved from [place] to [place] referred for evaluation of pancreatic cyst   Patient was evaluated by a gastroenterologist in [place] with 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  She was found to have 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 with brownish color fluid draining  The etiology was not clear  She was also found to have a 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  She reported she has chronic diarrhea with intermittent watery bowel movements    She reported 4 to 5 times loose bowel movement in a day for for 5 days followed by 2 days of normal bowel movement  She has recently gained some weight  She was not taking pancreatic enzyme  She underwent a colonoscopy by her endoscopist with findings negative for microscopic colitis   She was also diagnosed with pancreatitis by her previous gastroenterologist   Etiology is unclear  She denies smoking or alcohol use  Her father and her sister both passed away from pancreatic cancer  Her recent labs showed normal CBC and normal liver and kidney functions  REVIEW OF SYSTEMS:    CONSTITUTIONAL: Denies any fever, chills, rigors, and weight loss  HEENT: No earache or tinnitus  Denies hearing loss or visual disturbances  CARDIOVASCULAR: No chest pain or palpitations  RESPIRATORY: Denies any cough, hemoptysis, shortness of breath or dyspnea on exertion  GASTROINTESTINAL: As noted in the History of Present Illness  GENITOURINARY: No problems with urination  Denies any hematuria or dysuria  NEUROLOGIC: No dizziness or vertigo, denies headaches  MUSCULOSKELETAL: Denies any muscle or joint pain  SKIN: Denies skin rashes or itching  ENDOCRINE: Denies excessive thirst  Denies intolerance to heat or cold  PSYCHOSOCIAL: Denies depression or anxiety  Denies any recent memory loss         Historical Information   Past Medical History:   Diagnosis Date    Breast cancer ([facility] [address] ) 2007    Bilateral mastectomy with chemotherapy    Pancreatitis      Past Surgical History:   Procedure Laterality Date    BREAST BIOPSY      BREAST SURGERY      GANGLION CYST EXCISION Left     wrist    HIP SURGERY      HYSTERECTOMY      MASTECTOMY Bilateral 2007    PLANTAR FASCIA SURGERY Bilateral 1990    REFRACTIVE SURGERY      ROTATOR CUFF REPAIR Bilateral 2004 2005    TOTAL ABDOMINAL HYSTERECTOMY      w RSO    TUBAL LIGATION       Social History   Social History     Substance and Sexual Activity   Alcohol Use Not Currently    Comment: rare     Social History     Substance and Sexual Activity   Drug Use Never Social History     Tobacco Use   Smoking Status Former Smoker   Smokeless Tobacco Never Used     No family history on file  Meds/Allergies       Current Outpatient Medications:     aspirin 81 MG tablet    benazepril (LOTENSIN) 40 MG tablet    Calcium Carb-Cholecalciferol (CALCIUM 600 + D PO)    chlorthalidone 25 mg tablet    Coenzyme Q10 (CO Q-10) 200 MG CAPS    escitalopram (LEXAPRO) 10 mg tablet    latanoprost (XALATAN) 0 005 % ophthalmic solution    MELATONIN PO    metoprolol succinate (TOPROL-XL) 50 mg 24 hr tablet    Multiple Vitamins-Minerals (PRESERVISION/LUTEIN PO)    benzonatate (TESSALON PERLES) 100 mg capsule    Allergies   Allergen Reactions    Dorzolamide Hcl-Timolol Mal Other (See Comments)    Gabapentin Other (See Comments)    Lorazepam Hallucinations    Amlodipine Itching    Amoxicillin-Pot Clavulanate Diarrhea    Buprenorphine Hcl Hallucinations    Carbidopa     Cefdinir Diarrhea    Celecoxib Diarrhea    Clindamycin Diarrhea    Denosumab     Hydrocodone Hallucinations    Hydrocodone-Acetaminophen     Ibandronic Acid     Levodopa     Milnacipran     Pregabalin     Ropinirole Headache    Rosuvastatin Itching    Tramadol Nausea Only    Rotigotine Rash           Objective     Blood pressure 116/74, temperature (!) 97 2 °F (36 2 °C), height 4' 11" (1 499 m), weight 59 kg (130 lb), not currently breastfeeding  Body mass index is 26 26 kg/m²  PHYSICAL EXAM:      General Appearance:   Alert, cooperative, no distress   HEENT:   Normocephalic, atraumatic, anicteric      Neck:  Supple, symmetrical, trachea midline   Lungs:   Clear to auscultation bilaterally; no rales, rhonchi or wheezing; respirations unlabored    Heart[de-identified]   Regular rate and rhythm; no murmur, rub, or gallop     Abdomen:   Soft, non-tender, non-distended; normal bowel sounds; no masses, no organomegaly    Genitalia:   Deferred    Rectal:   Deferred    Extremities:  No cyanosis, clubbing or edema  Pulses:  2+ and symmetric    Skin:  No jaundice, rashes, or lesions    Lymph nodes:  No palpable cervical lymphadenopathy        Lab Results:   No visits with results within 1 Day(s) from this visit  Latest known visit with results is:   Clinical Support on 01/30/2020   Component Date Value    Lipase 01/30/2020 206     WBC 01/30/2020 5 14     RBC 01/30/2020 4 02     Hemoglobin 01/30/2020 11 6     Hematocrit 01/30/2020 38 4     MCV 01/30/2020 96     MCH 01/30/2020 28 9     MCHC 01/30/2020 30 2*    RDW 01/30/2020 13 2     Platelets 48/30/5122 303     MPV 01/30/2020 11 0     Sodium 01/30/2020 140     Potassium 01/30/2020 4 9     Chloride 01/30/2020 107     CO2 01/30/2020 29     ANION GAP 01/30/2020 4     BUN 01/30/2020 24     Creatinine 01/30/2020 1 02     Glucose, Fasting 01/30/2020 86     Calcium 01/30/2020 9 5     AST 01/30/2020 19     ALT 01/30/2020 40     Alkaline Phosphatase 01/30/2020 106     Total Protein 01/30/2020 6 8     Albumin 01/30/2020 3 8     Total Bilirubin 01/30/2020 0 42     eGFR 01/30/2020 54          Radiology Results:   No results found

## 2020-03-16 NOTE — TELEPHONE ENCOUNTER
Please contact patient to inquire about the indication for antibiotics prior to undergoing a dental procedure  In reviewing her chart, I do not see an indication for prophylactic antibiotics

## 2020-03-17 ENCOUNTER — TELEPHONE (OUTPATIENT)
Dept: GASTROENTEROLOGY | Facility: MEDICAL CENTER | Age: 75
End: 2020-03-17

## 2020-03-17 NOTE — TELEPHONE ENCOUNTER
----- Message from Rachel Redman MD sent at 3/17/2020  1:22 PM EDT -----  Labs showed normal kidney function test

## 2020-03-17 NOTE — TELEPHONE ENCOUNTER
Patient called back after I left the office on 3/16    I attempted to again contact her this morning and left a message asking that she call me directly this AM

## 2020-03-17 NOTE — TELEPHONE ENCOUNTER
According to current guidelines, prosthetic joints are no longer an indication for antibiotic prophylaxis  No need for antibiotic prophylactic therapy at this time

## 2020-03-17 NOTE — TELEPHONE ENCOUNTER
She had a hip replacement, knee replacement and both shoulders "replaced"  The last surgery was on her left shoulder 7/2019  She said that 1 hour prior to any dental work she was advised to take Keflex 500 mg, 4 capsules for a dose of 2000 mg by her physicians in [place]

## 2020-03-20 ENCOUNTER — HOSPITAL ENCOUNTER (OUTPATIENT)
Dept: MRI IMAGING | Facility: HOSPITAL | Age: 75
Discharge: HOME | End: 2020-03-20
Attending: INTERNAL MEDICINE
Payer: MEDICARE

## 2020-03-20 DIAGNOSIS — K86.9 PANCREATIC LESION: ICD-10-CM

## 2020-03-20 PROCEDURE — A9585 GADOBUTROL INJECTION: HCPCS | Performed by: INTERNAL MEDICINE

## 2020-03-20 PROCEDURE — 74183 MRI ABD W/O CNTR FLWD CNTR: CPT

## 2020-03-20 RX ADMIN — GADOBUTROL 5 ML: 604.72 INJECTION INTRAVENOUS at 14:06

## 2020-03-23 ENCOUNTER — APPOINTMENT (OUTPATIENT)
Dept: LAB | Facility: MEDICAL CENTER | Age: 75
End: 2020-03-23
Attending: INTERNAL MEDICINE
Payer: MEDICARE

## 2020-03-23 DIAGNOSIS — R19.7 DIARRHEA DUE TO MALABSORPTION: ICD-10-CM

## 2020-03-23 DIAGNOSIS — K90.9 DIARRHEA DUE TO MALABSORPTION: ICD-10-CM

## 2020-03-23 PROCEDURE — 83993 ASSAY FOR CALPROTECTIN FECAL: CPT

## 2020-03-23 PROCEDURE — 87177 OVA AND PARASITES SMEARS: CPT

## 2020-03-23 PROCEDURE — 87209 SMEAR COMPLEX STAIN: CPT

## 2020-03-23 PROCEDURE — 82656 EL-1 FECAL QUAL/SEMIQ: CPT

## 2020-03-23 PROCEDURE — 87505 NFCT AGENT DETECTION GI: CPT

## 2020-03-24 LAB
C DIFF TOX GENS STL QL NAA+PROBE: NEGATIVE
CAMPYLOBACTER DNA SPEC NAA+PROBE: NORMAL
O+P STL CONC: NORMAL
SALMONELLA DNA SPEC QL NAA+PROBE: NORMAL
SHIGA TOXIN STX GENE SPEC NAA+PROBE: NORMAL
SHIGELLA DNA SPEC QL NAA+PROBE: NORMAL

## 2020-03-25 DIAGNOSIS — K86.89 PANCREATIC INSUFFICIENCY: Primary | ICD-10-CM

## 2020-03-25 LAB
CALPROTECTIN STL-MCNT: <16 UG/G (ref 0–120)
ELASTASE PANC STL-MCNT: 146 UG ELAST./G

## 2020-03-27 ENCOUNTER — TELEPHONE (OUTPATIENT)
Dept: GASTROENTEROLOGY | Facility: MEDICAL CENTER | Age: 75
End: 2020-03-27

## 2020-03-27 DIAGNOSIS — K86.9 PANCREATIC LESION: Primary | ICD-10-CM

## 2020-03-27 NOTE — TELEPHONE ENCOUNTER
Called pharmacy, copay on zenpep is only $40   Spoke with patient and made her aware, she will pick this medication up

## 2020-03-27 NOTE — TELEPHONE ENCOUNTER
Jayne France - can you find out if co-pay/insurance coverage for zenpep would be better? If so, we can try prescribing that

## 2020-03-27 NOTE — TELEPHONE ENCOUNTER
Called pharmacy, Creon med is covered by insurance, no prior Dicky Foot is needed, but has a copay of $287  Smart GPS Backpack, patient has plan J meaning they will cover 50% of all prescriptions once $250 deductible is met  Has she met this deductible yet? We cannot do anything to bring down the copay of the Creon since insurance does cover, it just has a copay after insurance  Patient also cannot use a coupon savings card since she has Medicare  Called patientlaila to call office to discuss med

## 2020-03-27 NOTE — TELEPHONE ENCOUNTER
Spoke with patient and discussed this with her  She has met her deductible, so insurance has paid 50% of rx  Patient is unable to afford that copay  Are there any alternatives we can prescribe that may be cheaper?

## 2020-03-27 NOTE — TELEPHONE ENCOUNTER
----- Message from Augusta Blackwell MD sent at 3/27/2020 11:07 AM EDT -----  Regarding: FW: prescription  Hi, Sara,    Can you call her and ask her about the insurance  and and her  insurance for preauthorization so the insurance can pay for it?     Thanks    Augusta Blackwell       ----- Message -----  From: Harjinder Mccloud  Sent: 3/26/2020  12:11 PM EDT  To: Augusta Blackwell MD  Subject: prescription                                     Patient went to pharmacy to  script you called in yesterday, too expensive-please call her back

## 2020-03-27 NOTE — TELEPHONE ENCOUNTER
Called provider line [phone], advised that zenpep is covered and will have a copay of around $192, so it is cheaper than creon   Once prescribed, I can call a discount program for zenpep at [phone] to try and get that copay lowered

## 2020-03-30 ENCOUNTER — TELEPHONE (OUTPATIENT)
Dept: OBGYN CLINIC | Facility: MEDICAL CENTER | Age: 75
End: 2020-03-30

## 2020-03-30 NOTE — TELEPHONE ENCOUNTER
Received patient's records for her Right Shoulder  She is new in the area and looking to establish care with a surgeon  Records are scanned into her media for your review

## 2020-03-30 NOTE — TELEPHONE ENCOUNTER
Received this patient's records her right knee and hip  At this time the patient is looking to establish care with a new surgeon, since she is new in the area   All of the records are scanned into her media for review,

## 2020-03-30 NOTE — TELEPHONE ENCOUNTER
Mariposa Oquendo,     This does not seem urgent  I will see her once the coronavirus pandemic has improved and we start seeing patients in the office again  Please make her aware  Thank you

## 2020-03-31 ENCOUNTER — TELEPHONE (OUTPATIENT)
Dept: INTERNAL MEDICINE CLINIC | Facility: CLINIC | Age: 75
End: 2020-03-31

## 2020-03-31 ENCOUNTER — TELEMEDICINE (OUTPATIENT)
Dept: INTERNAL MEDICINE CLINIC | Facility: CLINIC | Age: 75
End: 2020-03-31
Payer: MEDICARE

## 2020-03-31 VITALS — SYSTOLIC BLOOD PRESSURE: 146 MMHG | DIASTOLIC BLOOD PRESSURE: 68 MMHG

## 2020-03-31 DIAGNOSIS — I10 ESSENTIAL HYPERTENSION: Primary | ICD-10-CM

## 2020-03-31 DIAGNOSIS — I10 ESSENTIAL HYPERTENSION: ICD-10-CM

## 2020-03-31 PROBLEM — J06.9 URI, ACUTE: Status: RESOLVED | Noted: 2020-02-18 | Resolved: 2020-03-31

## 2020-03-31 PROCEDURE — 99442 PR PHYS/QHP TELEPHONE EVALUATION 11-20 MIN: CPT | Performed by: INTERNAL MEDICINE

## 2020-03-31 RX ORDER — DOXAZOSIN MESYLATE 1 MG/1
TABLET ORAL
Qty: 90 TABLET | OUTPATIENT
Start: 2020-03-31

## 2020-03-31 RX ORDER — DOXAZOSIN MESYLATE 1 MG/1
1 TABLET ORAL DAILY
Qty: 30 TABLET | Refills: 0 | Status: SHIPPED | OUTPATIENT
Start: 2020-03-31 | End: 2020-04-14

## 2020-03-31 NOTE — ASSESSMENT & PLAN NOTE
Discontinue chlorthalidone due to allergic reaction  She also has an allergic reaction to amlodipine (itchiness)  Continue metoprolol and benazepril, will add doxazosin 1 mg daily  She is to check her blood pressure at home daily for the next 2 weeks and contact me with blood pressure readings

## 2020-03-31 NOTE — PROGRESS NOTES
Virtual Brief Visit    Problem List Items Addressed This Visit        Cardiovascular and Mediastinum    Hypertensive disorder - Primary     Discontinue chlorthalidone due to allergic reaction  She also has an allergic reaction to amlodipine (itchiness)  Continue metoprolol and benazepril, will add doxazosin 1 mg daily  She is to check her blood pressure at home daily for the next 2 weeks and contact me with blood pressure readings  Relevant Medications    doxazosin (CARDURA) 1 mg tablet              Chief Complaint   Patient presents with    Allergic Reaction     Patient c/o allergic reaction from Chlorithalidone  Pt states3 weeks ago she started with the medication and the rash started but pt conituned the medication bc her high blood pressure reading   Virtual Brief Visit       Encounter provider Cali Martines MD    Provider located at [address] [place] [address] [address] [facility] [id number]      Recent Visits  No visits were found meeting these conditions  Showing recent visits within past 7 days and meeting all other requirements     Today's Visits  Date Type Provider Dept   03/31/20 Tamy Pearce MD [facility]   03/31/20 Telephone Cali Martines MD [facility]   Showing today's visits and meeting all other requirements     Future Appointments  Date Type Provider Dept   03/31/20 Telemedicine Cali Martines MD [facility]   Showing future appointments within next 150 days and meeting all other requirements        After connecting through telephone, the patient was identified by name and date of birth  Philip Gao was informed that this is a telemedicine visit and that the visit is being conducted through telephone  My office door was closed  No one else was in the room    She acknowledged consent and understanding of privacy and security of the video platform  The patient has agreed to participate and understands they can discontinue the visit at any time  Patient is aware this is a billable service  Subjective  Janee Lance is a 66 y o  female is contacted today regarding concern for an allergic reaction to chlorthalidone  She was started on chlorthalidone by her cardiologist in December 2019 and since has developed itchiness, intermittent episodes of facial edema/swelling, and is now presenting with a rash  She has not started any new medications since or prior to onset of symptoms  She checks her blood pressure regularly at home and with chlorthalidone has good control with a blood pressure reading of 112/70 and without 146/68  She did perform a trial to evaluate her symptoms while off chlorthalidone and when she stop taking chlorthalidone had resolution of symptoms      Past Medical History:   Diagnosis Date    Breast cancer [facility]) 2007    Bilateral mastectomy with chemotherapy    Pancreatitis        Past Surgical History:   Procedure Laterality Date    BREAST BIOPSY      BREAST SURGERY      GANGLION CYST EXCISION Left     wrist    HIP SURGERY      HYSTERECTOMY      MASTECTOMY Bilateral 2007    PLANTAR FASCIA SURGERY Bilateral 1990    REFRACTIVE SURGERY      ROTATOR CUFF REPAIR Bilateral 2004 2005    TOTAL ABDOMINAL HYSTERECTOMY      w RSO    TUBAL LIGATION         Current Outpatient Medications   Medication Sig Dispense Refill    aspirin 81 MG tablet Take 1 tablet by mouth daily      benazepril (LOTENSIN) 40 MG tablet Take 1 tablet by mouth daily      Calcium Carb-Cholecalciferol (CALCIUM 600 + D PO) Take 1 tablet by mouth daily      Coenzyme Q10 (CO Q-10) 200 MG CAPS Take 1 tablet by mouth daily      MELATONIN PO Take by mouth      metoprolol succinate (TOPROL-XL) 50 mg 24 hr tablet Take 1 tablet (50 mg total) by mouth daily 90 tablet 1    Multiple Vitamins-Minerals (PRESERVISION/LUTEIN PO) PreserVision Lutein      pancrelipase, Lip-Prot-Amyl, (CREON) 12,000 units capsule Take 12,000 units of lipase by mouth 3 (three) times a day with meals 270 capsule 2    Pancrelipase, Lip-Prot-Amyl, (Zenpep) 3000-08744 units CPEP Take 14,000 units of lipase by mouth 3 (three) times a day with meals 90 capsule 4    doxazosin (CARDURA) 1 mg tablet Take 1 tablet (1 mg total) by mouth daily 30 tablet 0    escitalopram (LEXAPRO) 10 mg tablet Take 1 tablet (10 mg total) by mouth daily 90 tablet 1    latanoprost (XALATAN) 0 005 % ophthalmic solution Administer 1 drop to both eyes daily       No current facility-administered medications for this visit  Allergies   Allergen Reactions    Dorzolamide Hcl-Timolol Mal Other (See Comments)    Gabapentin Other (See Comments)    Lorazepam Hallucinations    Amlodipine Itching    Amoxicillin-Pot Clavulanate Diarrhea    Buprenorphine Hcl Hallucinations    Carbidopa     Cefdinir Diarrhea    Celecoxib Diarrhea    Chlorthalidone Edema     Itchiness, facial swelling, rash    Clindamycin Diarrhea    Denosumab     Hydrocodone Hallucinations    Hydrocodone-Acetaminophen     Ibandronic Acid     Levodopa     Milnacipran     Pregabalin     Ropinirole Headache    Rosuvastatin Itching    Tramadol Nausea Only    Rotigotine Rash       Review of Systems   Constitutional: Negative for activity change, appetite change, chills, diaphoresis, fatigue and fever  HENT: Negative for congestion, postnasal drip, rhinorrhea, sinus pressure, sinus pain, sneezing and sore throat  Eyes: Negative for visual disturbance  Respiratory: Negative for apnea, cough, choking, chest tightness, shortness of breath and wheezing  Cardiovascular: Negative for chest pain, palpitations and leg swelling  Gastrointestinal: Negative for abdominal distention, abdominal pain, anal bleeding, blood in stool, constipation, diarrhea, nausea and vomiting     Endocrine: Negative for cold intolerance and heat intolerance  Genitourinary: Negative for difficulty urinating, dysuria and hematuria  Musculoskeletal: Negative  Skin: Positive for rash  Neurological: Negative for dizziness, weakness, light-headedness, numbness and headaches  Hematological: Negative for adenopathy  Psychiatric/Behavioral: Negative for agitation, sleep disturbance and suicidal ideas  All other systems reviewed and are negative  I spent 15 minutes with the patient during this visit    [id number]

## 2020-03-31 NOTE — TELEPHONE ENCOUNTER
Called patient and relayed message  She stated she is okay waiting until the pandemic improves as she is not eager to come out of her home anyway  She appreciated my call and communicating with info

## 2020-03-31 NOTE — TELEPHONE ENCOUNTER
Please call the patient and state that Dr Angel Velazquez is not seeing new patients at this time until COVID- 19 is over  She may schedule to see Angel Velazquez starting in MAY 2020

## 2020-04-09 NOTE — TELEPHONE ENCOUNTER
Patient is scheduled for May12  Advised her it may change due to Covid-19 status  She also had her left shoulder replaced also in July 2019  I will try to obtain those records

## 2020-04-13 ENCOUNTER — TELEPHONE (OUTPATIENT)
Dept: GASTROENTEROLOGY | Facility: AMBULARY SURGERY CENTER | Age: 75
End: 2020-04-13

## 2020-04-14 ENCOUNTER — TELEMEDICINE (OUTPATIENT)
Dept: INTERNAL MEDICINE CLINIC | Facility: CLINIC | Age: 75
End: 2020-04-14
Payer: MEDICARE

## 2020-04-14 VITALS — DIASTOLIC BLOOD PRESSURE: 75 MMHG | SYSTOLIC BLOOD PRESSURE: 161 MMHG | HEART RATE: 71 BPM

## 2020-04-14 DIAGNOSIS — I10 ESSENTIAL HYPERTENSION: ICD-10-CM

## 2020-04-14 DIAGNOSIS — I10 ESSENTIAL HYPERTENSION: Primary | ICD-10-CM

## 2020-04-14 PROCEDURE — 99442 PR PHYS/QHP TELEPHONE EVALUATION 11-20 MIN: CPT | Performed by: INTERNAL MEDICINE

## 2020-04-14 RX ORDER — HYDRALAZINE HYDROCHLORIDE 10 MG/1
TABLET, FILM COATED ORAL
Qty: 270 TABLET | OUTPATIENT
Start: 2020-04-14

## 2020-04-14 RX ORDER — HYDRALAZINE HYDROCHLORIDE 10 MG/1
10 TABLET, FILM COATED ORAL 3 TIMES DAILY
Qty: 30 TABLET | Refills: 0 | Status: SHIPPED | OUTPATIENT
Start: 2020-04-14 | End: 2020-04-22 | Stop reason: SDUPTHER

## 2020-04-22 ENCOUNTER — TELEMEDICINE (OUTPATIENT)
Dept: INTERNAL MEDICINE CLINIC | Facility: CLINIC | Age: 75
End: 2020-04-22
Payer: MEDICARE

## 2020-04-22 VITALS
HEART RATE: 76 BPM | BODY MASS INDEX: 26.21 KG/M2 | WEIGHT: 130 LBS | HEIGHT: 59 IN | DIASTOLIC BLOOD PRESSURE: 81 MMHG | SYSTOLIC BLOOD PRESSURE: 149 MMHG

## 2020-04-22 DIAGNOSIS — I10 ESSENTIAL HYPERTENSION: ICD-10-CM

## 2020-04-22 DIAGNOSIS — F41.9 ANXIETY: Primary | ICD-10-CM

## 2020-04-22 DIAGNOSIS — F34.1 DYSTHYMIA: ICD-10-CM

## 2020-04-22 PROCEDURE — 99442 PR PHYS/QHP TELEPHONE EVALUATION 11-20 MIN: CPT | Performed by: INTERNAL MEDICINE

## 2020-04-22 RX ORDER — CLONIDINE HYDROCHLORIDE 0.1 MG/1
TABLET ORAL
Qty: 180 TABLET | OUTPATIENT
Start: 2020-04-22

## 2020-04-22 RX ORDER — HYDRALAZINE HYDROCHLORIDE 10 MG/1
10 TABLET, FILM COATED ORAL 3 TIMES DAILY
Qty: 42 TABLET | Refills: 0 | Status: SHIPPED | OUTPATIENT
Start: 2020-04-22 | End: 2020-05-01 | Stop reason: SDUPTHER

## 2020-04-22 RX ORDER — HYDRALAZINE HYDROCHLORIDE 10 MG/1
TABLET, FILM COATED ORAL
Qty: 270 TABLET | OUTPATIENT
Start: 2020-04-22

## 2020-04-22 RX ORDER — ESCITALOPRAM OXALATE 20 MG/1
20 TABLET ORAL DAILY
Qty: 30 TABLET | Refills: 3 | Status: SHIPPED | OUTPATIENT
Start: 2020-04-22 | End: 2020-09-01

## 2020-04-22 RX ORDER — CLONIDINE HYDROCHLORIDE 0.1 MG/1
0.1 TABLET ORAL EVERY 12 HOURS SCHEDULED
Qty: 28 TABLET | Refills: 0 | Status: SHIPPED | OUTPATIENT
Start: 2020-04-22 | End: 2020-05-01

## 2020-05-01 ENCOUNTER — TELEMEDICINE (OUTPATIENT)
Dept: INTERNAL MEDICINE CLINIC | Facility: CLINIC | Age: 75
End: 2020-05-01
Payer: MEDICARE

## 2020-05-01 VITALS — SYSTOLIC BLOOD PRESSURE: 153 MMHG | DIASTOLIC BLOOD PRESSURE: 70 MMHG

## 2020-05-01 DIAGNOSIS — I10 ESSENTIAL HYPERTENSION: ICD-10-CM

## 2020-05-01 PROCEDURE — 99213 OFFICE O/P EST LOW 20 MIN: CPT | Performed by: INTERNAL MEDICINE

## 2020-05-01 RX ORDER — HYDRALAZINE HYDROCHLORIDE 25 MG/1
TABLET, FILM COATED ORAL
Qty: 270 TABLET | OUTPATIENT
Start: 2020-05-01

## 2020-05-01 RX ORDER — HYDRALAZINE HYDROCHLORIDE 25 MG/1
25 TABLET, FILM COATED ORAL 3 TIMES DAILY
Qty: 90 TABLET | Refills: 0 | Status: SHIPPED | OUTPATIENT
Start: 2020-05-01 | End: 2020-06-03 | Stop reason: SDUPTHER

## 2020-05-06 ENCOUNTER — TELEMEDICINE (OUTPATIENT)
Dept: INTERNAL MEDICINE CLINIC | Facility: CLINIC | Age: 75
End: 2020-05-06
Payer: MEDICARE

## 2020-05-06 ENCOUNTER — APPOINTMENT (OUTPATIENT)
Dept: URGENT CARE | Age: 75
End: 2020-05-06
Payer: MEDICARE

## 2020-05-06 ENCOUNTER — TELEPHONE (OUTPATIENT)
Dept: OTHER | Facility: OTHER | Age: 75
End: 2020-05-06

## 2020-05-06 DIAGNOSIS — J06.9 VIRAL UPPER RESPIRATORY TRACT INFECTION: ICD-10-CM

## 2020-05-06 DIAGNOSIS — Z20.828 EXPOSURE TO SARS-ASSOCIATED CORONAVIRUS: Primary | ICD-10-CM

## 2020-05-06 PROCEDURE — U0003 INFECTIOUS AGENT DETECTION BY NUCLEIC ACID (DNA OR RNA); SEVERE ACUTE RESPIRATORY SYNDROME CORONAVIRUS 2 (SARS-COV-2) (CORONAVIRUS DISEASE [COVID-19]), AMPLIFIED PROBE TECHNIQUE, MAKING USE OF HIGH THROUGHPUT TECHNOLOGIES AS DESCRIBED BY CMS-2020-01-R: HCPCS | Performed by: INTERNAL MEDICINE

## 2020-05-06 PROCEDURE — 99442 PR PHYS/QHP TELEPHONE EVALUATION 11-20 MIN: CPT | Performed by: INTERNAL MEDICINE

## 2020-05-07 ENCOUNTER — DOCUMENTATION (OUTPATIENT)
Dept: URGENT CARE | Age: 75
End: 2020-05-07

## 2020-05-07 ENCOUNTER — TELEPHONE (OUTPATIENT)
Dept: URGENT CARE | Age: 75
End: 2020-05-07

## 2020-05-08 ENCOUNTER — TELEPHONE (OUTPATIENT)
Dept: GASTROENTEROLOGY | Facility: MEDICAL CENTER | Age: 75
End: 2020-05-08

## 2020-05-08 LAB — SARS-COV-2 RNA SPEC QL NAA+PROBE: NOT DETECTED

## 2020-05-09 ENCOUNTER — TELEPHONE (OUTPATIENT)
Dept: OTHER | Facility: HOSPITAL | Age: 75
End: 2020-05-09

## 2020-05-13 ENCOUNTER — TELEMEDICINE (OUTPATIENT)
Dept: INTERNAL MEDICINE CLINIC | Facility: CLINIC | Age: 75
End: 2020-05-13
Payer: MEDICARE

## 2020-05-13 VITALS — SYSTOLIC BLOOD PRESSURE: 146 MMHG | DIASTOLIC BLOOD PRESSURE: 81 MMHG | HEART RATE: 106 BPM | TEMPERATURE: 99.9 F

## 2020-05-13 DIAGNOSIS — J06.9 URI, ACUTE: Primary | ICD-10-CM

## 2020-05-13 PROCEDURE — 99442 PR PHYS/QHP TELEPHONE EVALUATION 11-20 MIN: CPT | Performed by: INTERNAL MEDICINE

## 2020-05-13 RX ORDER — AZITHROMYCIN 250 MG/1
TABLET, FILM COATED ORAL
Qty: 6 TABLET | Refills: 0 | Status: SHIPPED | OUTPATIENT
Start: 2020-05-13 | End: 2020-05-17

## 2020-05-29 DIAGNOSIS — I10 ESSENTIAL HYPERTENSION: ICD-10-CM

## 2020-06-01 RX ORDER — HYDRALAZINE HYDROCHLORIDE 25 MG/1
TABLET, FILM COATED ORAL
Qty: 90 TABLET | Refills: 0 | OUTPATIENT
Start: 2020-06-01

## 2020-06-02 ENCOUNTER — TELEMEDICINE (OUTPATIENT)
Dept: GASTROENTEROLOGY | Facility: MEDICAL CENTER | Age: 75
End: 2020-06-02
Payer: MEDICARE

## 2020-06-02 ENCOUNTER — TELEPHONE (OUTPATIENT)
Dept: SURGICAL ONCOLOGY | Facility: CLINIC | Age: 75
End: 2020-06-02

## 2020-06-02 DIAGNOSIS — K86.9 PANCREATIC LESION: ICD-10-CM

## 2020-06-02 DIAGNOSIS — K21.9 GASTROESOPHAGEAL REFLUX DISEASE WITHOUT ESOPHAGITIS: Primary | ICD-10-CM

## 2020-06-02 DIAGNOSIS — Z80.0 FAMILY HISTORY OF PANCREATIC CANCER: ICD-10-CM

## 2020-06-02 PROCEDURE — 3079F DIAST BP 80-89 MM HG: CPT | Performed by: INTERNAL MEDICINE

## 2020-06-02 PROCEDURE — 99442 PR PHYS/QHP TELEPHONE EVALUATION 11-20 MIN: CPT | Performed by: INTERNAL MEDICINE

## 2020-06-02 PROCEDURE — 1036F TOBACCO NON-USER: CPT | Performed by: INTERNAL MEDICINE

## 2020-06-02 PROCEDURE — 3077F SYST BP >= 140 MM HG: CPT | Performed by: INTERNAL MEDICINE

## 2020-06-03 ENCOUNTER — HOSPITAL ENCOUNTER (OUTPATIENT)
Dept: NON INVASIVE DIAGNOSTICS | Facility: HOSPITAL | Age: 75
Discharge: HOME | End: 2020-06-03
Attending: INTERNAL MEDICINE
Payer: MEDICARE

## 2020-06-03 ENCOUNTER — OFFICE VISIT (OUTPATIENT)
Dept: INTERNAL MEDICINE CLINIC | Facility: CLINIC | Age: 75
End: 2020-06-03
Payer: MEDICARE

## 2020-06-03 VITALS
SYSTOLIC BLOOD PRESSURE: 138 MMHG | HEIGHT: 59 IN | WEIGHT: 138.6 LBS | DIASTOLIC BLOOD PRESSURE: 76 MMHG | BODY MASS INDEX: 27.94 KG/M2 | RESPIRATION RATE: 18 BRPM | TEMPERATURE: 99.7 F | HEART RATE: 79 BPM | OXYGEN SATURATION: 94 %

## 2020-06-03 DIAGNOSIS — R07.89 ATYPICAL CHEST PAIN: ICD-10-CM

## 2020-06-03 DIAGNOSIS — I10 ESSENTIAL HYPERTENSION: ICD-10-CM

## 2020-06-03 DIAGNOSIS — M79.662 PAIN OF LEFT CALF: Primary | ICD-10-CM

## 2020-06-03 DIAGNOSIS — M79.662 PAIN OF LEFT CALF: ICD-10-CM

## 2020-06-03 PROCEDURE — 3075F SYST BP GE 130 - 139MM HG: CPT | Performed by: INTERNAL MEDICINE

## 2020-06-03 PROCEDURE — 3008F BODY MASS INDEX DOCD: CPT | Performed by: INTERNAL MEDICINE

## 2020-06-03 PROCEDURE — 99213 OFFICE O/P EST LOW 20 MIN: CPT | Performed by: INTERNAL MEDICINE

## 2020-06-03 PROCEDURE — 1036F TOBACCO NON-USER: CPT | Performed by: INTERNAL MEDICINE

## 2020-06-03 PROCEDURE — 93971 EXTREMITY STUDY: CPT

## 2020-06-03 PROCEDURE — 93000 ELECTROCARDIOGRAM COMPLETE: CPT | Performed by: INTERNAL MEDICINE

## 2020-06-03 PROCEDURE — 1160F RVW MEDS BY RX/DR IN RCRD: CPT | Performed by: INTERNAL MEDICINE

## 2020-06-03 PROCEDURE — 3078F DIAST BP <80 MM HG: CPT | Performed by: INTERNAL MEDICINE

## 2020-06-03 RX ORDER — HYDRALAZINE HYDROCHLORIDE 25 MG/1
25 TABLET, FILM COATED ORAL 3 TIMES DAILY
Qty: 90 TABLET | Refills: 3 | Status: SHIPPED | OUTPATIENT
Start: 2020-06-03 | End: 2020-08-18

## 2020-06-04 ENCOUNTER — TELEPHONE (OUTPATIENT)
Dept: INTERNAL MEDICINE CLINIC | Facility: CLINIC | Age: 75
End: 2020-06-04

## 2020-06-04 DIAGNOSIS — M71.22 POPLITEAL CYST, LEFT: Primary | ICD-10-CM

## 2020-06-04 PROCEDURE — 93971 EXTREMITY STUDY: CPT | Performed by: SURGERY

## 2020-06-04 RX ORDER — HYDRALAZINE HYDROCHLORIDE 25 MG/1
TABLET, FILM COATED ORAL
Qty: 270 TABLET | OUTPATIENT
Start: 2020-06-04

## 2020-06-09 ENCOUNTER — TRANSCRIBE ORDERS (OUTPATIENT)
Dept: RADIOLOGY | Facility: HOSPITAL | Age: 75
End: 2020-06-09

## 2020-06-09 ENCOUNTER — HOSPITAL ENCOUNTER (OUTPATIENT)
Dept: RADIOLOGY | Facility: HOSPITAL | Age: 75
Discharge: HOME | End: 2020-06-09
Attending: OTOLARYNGOLOGY
Payer: MEDICARE

## 2020-06-09 DIAGNOSIS — J32.2 CHRONIC ETHMOIDAL SINUSITIS: ICD-10-CM

## 2020-06-09 DIAGNOSIS — J32.0 CHRONIC MAXILLARY SINUSITIS: ICD-10-CM

## 2020-06-09 DIAGNOSIS — J30.1 SEASONAL ALLERGIC RHINITIS DUE TO POLLEN: ICD-10-CM

## 2020-06-09 DIAGNOSIS — G50.1 ATYPICAL FACIAL PAIN: ICD-10-CM

## 2020-06-09 DIAGNOSIS — R44.8 FACIAL PRESSURE: ICD-10-CM

## 2020-06-09 DIAGNOSIS — R09.81 NASAL CONGESTION: ICD-10-CM

## 2020-06-09 DIAGNOSIS — J32.3 CHRONIC SPHENOIDAL SINUSITIS: ICD-10-CM

## 2020-06-09 DIAGNOSIS — J32.1 CHRONIC FRONTAL SINUSITIS: ICD-10-CM

## 2020-06-09 PROCEDURE — 70486 CT MAXILLOFACIAL W/O DYE: CPT

## 2020-06-22 ENCOUNTER — PREP FOR PROCEDURE (OUTPATIENT)
Dept: GASTROENTEROLOGY | Facility: CLINIC | Age: 75
End: 2020-06-22

## 2020-06-22 DIAGNOSIS — Z20.822 ENCOUNTER FOR LABORATORY TESTING FOR COVID-19 VIRUS: Primary | ICD-10-CM

## 2020-06-30 ENCOUNTER — OFFICE VISIT (OUTPATIENT)
Dept: OBGYN CLINIC | Facility: HOSPITAL | Age: 75
End: 2020-06-30
Attending: INTERNAL MEDICINE
Payer: MEDICARE

## 2020-06-30 ENCOUNTER — TELEPHONE (OUTPATIENT)
Dept: GASTROENTEROLOGY | Facility: CLINIC | Age: 75
End: 2020-06-30

## 2020-06-30 ENCOUNTER — HOSPITAL ENCOUNTER (OUTPATIENT)
Dept: RADIOLOGY | Facility: HOSPITAL | Age: 75
Discharge: HOME | End: 2020-06-30
Attending: ORTHOPAEDIC SURGERY

## 2020-06-30 ENCOUNTER — HOSPITAL ENCOUNTER (OUTPATIENT)
Dept: RADIOLOGY | Facility: HOSPITAL | Age: 75
Discharge: HOME | End: 2020-06-30
Attending: ORTHOPAEDIC SURGERY
Payer: MEDICARE

## 2020-06-30 VITALS
HEART RATE: 82 BPM | BODY MASS INDEX: 27.87 KG/M2 | DIASTOLIC BLOOD PRESSURE: 82 MMHG | SYSTOLIC BLOOD PRESSURE: 158 MMHG | HEIGHT: 59 IN

## 2020-06-30 DIAGNOSIS — M23.51 CHRONIC INSTABILITY OF RIGHT KNEE: ICD-10-CM

## 2020-06-30 DIAGNOSIS — M25.562 LEFT KNEE PAIN, UNSPECIFIED CHRONICITY: Primary | ICD-10-CM

## 2020-06-30 DIAGNOSIS — M25.562 LEFT KNEE PAIN, UNSPECIFIED CHRONICITY: ICD-10-CM

## 2020-06-30 DIAGNOSIS — M71.22 POPLITEAL CYST, LEFT: ICD-10-CM

## 2020-06-30 DIAGNOSIS — Z96.651 HISTORY OF RIGHT KNEE JOINT REPLACEMENT: ICD-10-CM

## 2020-06-30 DIAGNOSIS — M25.561 RIGHT KNEE PAIN, UNSPECIFIED CHRONICITY: ICD-10-CM

## 2020-06-30 PROCEDURE — 73562 X-RAY EXAM OF KNEE 3: CPT

## 2020-06-30 PROCEDURE — 99203 OFFICE O/P NEW LOW 30 MIN: CPT | Performed by: ORTHOPAEDIC SURGERY

## 2020-06-30 PROCEDURE — 3077F SYST BP >= 140 MM HG: CPT | Performed by: ORTHOPAEDIC SURGERY

## 2020-06-30 PROCEDURE — 3079F DIAST BP 80-89 MM HG: CPT | Performed by: ORTHOPAEDIC SURGERY

## 2020-06-30 PROCEDURE — 1160F RVW MEDS BY RX/DR IN RCRD: CPT | Performed by: ORTHOPAEDIC SURGERY

## 2020-06-30 PROCEDURE — 1036F TOBACCO NON-USER: CPT | Performed by: ORTHOPAEDIC SURGERY

## 2020-06-30 PROCEDURE — 73560 X-RAY EXAM OF KNEE 1 OR 2: CPT

## 2020-06-30 RX ORDER — FLUOROURACIL 50 MG/G
CREAM TOPICAL
COMMUNITY
Start: 2020-06-16 | End: 2020-08-18 | Stop reason: ALTCHOICE

## 2020-07-01 DIAGNOSIS — Z20.822 ENCOUNTER FOR LABORATORY TESTING FOR COVID-19 VIRUS: ICD-10-CM

## 2020-07-01 PROCEDURE — U0003 INFECTIOUS AGENT DETECTION BY NUCLEIC ACID (DNA OR RNA); SEVERE ACUTE RESPIRATORY SYNDROME CORONAVIRUS 2 (SARS-COV-2) (CORONAVIRUS DISEASE [COVID-19]), AMPLIFIED PROBE TECHNIQUE, MAKING USE OF HIGH THROUGHPUT TECHNOLOGIES AS DESCRIBED BY CMS-2020-01-R: HCPCS

## 2020-07-05 ENCOUNTER — ANESTHESIA EVENT (OUTPATIENT)
Dept: GASTROENTEROLOGY | Facility: HOSPITAL | Age: 75
End: 2020-07-05

## 2020-07-05 NOTE — ANESTHESIA PREPROCEDURE EVALUATION
Review of Systems/Medical History  Patient summary reviewed  Chart reviewed  No history of anesthetic complications     Cardiovascular  Exercise tolerance (METS): >4,  Hyperlipidemia, Hypertension , No past MI ,    Pulmonary  Smoker ex-smoker  , No COPD ,        GI/Hepatic    PUD, Pancreatic problem (h/o chronic pancreatitis with pancreatic cyst; pancreatic insufficiency),        Negative  ROS        Endo/Other  Negative endo/other ROS      GYN    Breast cancer Right mastectomy, left mastectomy and axillary node dissection       Hematology  Negative hematology ROS      Musculoskeletal  Rheumatoid arthritis , Back pain ,        Neurology    Fibromyalgia   Psychology   Anxiety,          EKG 6/3/2020:  RBBB  Sinus rhythm  MRI Abdomen 3/2020:  1 0 x 1 4 cm multilocular cystic mass in the uncinate process of the pancreas  This is most likely a side branch IPMN or a serous cystic neoplasm  Lab Results   Component Value Date    WBC 5 14 01/30/2020    HGB 11 6 01/30/2020    HCT 38 4 01/30/2020    MCV 96 01/30/2020     01/30/2020     Lab Results   Component Value Date    SODIUM 140 03/16/2020    K 4 1 03/16/2020     03/16/2020    CO2 29 03/16/2020    BUN 24 03/16/2020    CREATININE 1 19 03/16/2020    GLUC 91 03/16/2020    CALCIUM 9 4 03/16/2020     No results found for: INR, PROTIME      Physical Exam    Airway    Mallampati score: II  TM Distance: >3 FB  Neck ROM: full     Dental   No notable dental hx implants,     Cardiovascular  Cardiovascular exam normal    Pulmonary  Pulmonary exam normal     Other Findings        Anesthesia Plan  ASA Score- 3     Anesthesia Type- IV sedation with anesthesia with ASA Monitors  Additional Monitors:   Airway Plan:         Plan Factors-    Induction- intravenous  Postoperative Plan-     Informed Consent- Anesthetic plan and risks discussed with patient  I personally reviewed this patient with the CRNA   Discussed and agreed on the Anesthesia Plan with the KATHLEEN Lee

## 2020-07-06 ENCOUNTER — HOSPITAL ENCOUNTER (OUTPATIENT)
Dept: GASTROENTEROLOGY | Facility: HOSPITAL | Age: 75
Setting detail: OUTPATIENT SURGERY
Discharge: HOME | End: 2020-07-06
Attending: INTERNAL MEDICINE | Admitting: INTERNAL MEDICINE
Payer: MEDICARE

## 2020-07-06 ENCOUNTER — ANESTHESIA (OUTPATIENT)
Dept: GASTROENTEROLOGY | Facility: HOSPITAL | Age: 75
End: 2020-07-06

## 2020-07-06 VITALS
RESPIRATION RATE: 18 BRPM | HEART RATE: 66 BPM | DIASTOLIC BLOOD PRESSURE: 51 MMHG | BODY MASS INDEX: 27.71 KG/M2 | WEIGHT: 132 LBS | OXYGEN SATURATION: 96 % | TEMPERATURE: 98.3 F | HEIGHT: 58 IN | SYSTOLIC BLOOD PRESSURE: 109 MMHG

## 2020-07-06 DIAGNOSIS — K86.89 PANCREATIC INSUFFICIENCY: ICD-10-CM

## 2020-07-06 LAB — SARS-COV-2 RNA SPEC QL NAA+PROBE: NOT DETECTED

## 2020-07-06 PROCEDURE — 88305 TISSUE EXAM BY PATHOLOGIST: CPT | Performed by: PATHOLOGY

## 2020-07-06 PROCEDURE — 43237 ENDOSCOPIC US EXAM ESOPH: CPT | Performed by: INTERNAL MEDICINE

## 2020-07-06 PROCEDURE — 88313 SPECIAL STAINS GROUP 2: CPT | Performed by: PATHOLOGY

## 2020-07-06 PROCEDURE — 88342 IMHCHEM/IMCYTCHM 1ST ANTB: CPT | Performed by: PATHOLOGY

## 2020-07-06 PROCEDURE — 1123F ACP DISCUSS/DSCN MKR DOCD: CPT | Performed by: INTERNAL MEDICINE

## 2020-07-06 PROCEDURE — 43239 EGD BIOPSY SINGLE/MULTIPLE: CPT | Performed by: INTERNAL MEDICINE

## 2020-07-06 RX ORDER — LIDOCAINE HYDROCHLORIDE 10 MG/ML
INJECTION, SOLUTION EPIDURAL; INFILTRATION; INTRACAUDAL; PERINEURAL AS NEEDED
Status: DISCONTINUED | OUTPATIENT
Start: 2020-07-06 | End: 2020-07-06 | Stop reason: SURG

## 2020-07-06 RX ORDER — PROPOFOL 10 MG/ML
INJECTION, EMULSION INTRAVENOUS AS NEEDED
Status: DISCONTINUED | OUTPATIENT
Start: 2020-07-06 | End: 2020-07-06 | Stop reason: SURG

## 2020-07-06 RX ORDER — SODIUM CHLORIDE 9 MG/ML
INJECTION, SOLUTION INTRAVENOUS CONTINUOUS PRN
Status: DISCONTINUED | OUTPATIENT
Start: 2020-07-06 | End: 2020-07-06 | Stop reason: SURG

## 2020-07-06 RX ORDER — PROPOFOL 10 MG/ML
INJECTION, EMULSION INTRAVENOUS CONTINUOUS PRN
Status: DISCONTINUED | OUTPATIENT
Start: 2020-07-06 | End: 2020-07-06 | Stop reason: SURG

## 2020-07-06 RX ORDER — LIDOCAINE HYDROCHLORIDE 10 MG/ML
INJECTION, SOLUTION EPIDURAL; INFILTRATION; INTRACAUDAL; PERINEURAL AS NEEDED
Status: DISCONTINUED | OUTPATIENT
Start: 2020-07-06 | End: 2020-07-06

## 2020-07-06 RX ADMIN — PROPOFOL 120 MG: 10 INJECTION, EMULSION INTRAVENOUS at 13:43

## 2020-07-06 RX ADMIN — PHENYLEPHRINE HYDROCHLORIDE 150 MCG: 10 INJECTION INTRAVENOUS at 13:55

## 2020-07-06 RX ADMIN — LIDOCAINE HYDROCHLORIDE 50 MG: 10 INJECTION, SOLUTION EPIDURAL; INFILTRATION; INTRACAUDAL; PERINEURAL at 13:43

## 2020-07-06 RX ADMIN — PROPOFOL 30 MG: 10 INJECTION, EMULSION INTRAVENOUS at 13:50

## 2020-07-06 RX ADMIN — PROPOFOL 20 MG: 10 INJECTION, EMULSION INTRAVENOUS at 13:55

## 2020-07-06 RX ADMIN — PHENYLEPHRINE HYDROCHLORIDE 100 MCG: 10 INJECTION INTRAVENOUS at 14:12

## 2020-07-06 RX ADMIN — SODIUM CHLORIDE: 0.9 INJECTION, SOLUTION INTRAVENOUS at 13:34

## 2020-07-06 RX ADMIN — PROPOFOL 20 MG: 10 INJECTION, EMULSION INTRAVENOUS at 14:00

## 2020-07-06 RX ADMIN — PHENYLEPHRINE HYDROCHLORIDE 20 MCG/MIN: 10 INJECTION INTRAVENOUS at 13:53

## 2020-07-06 RX ADMIN — PROPOFOL 120 MCG/KG/MIN: 10 INJECTION, EMULSION INTRAVENOUS at 13:43

## 2020-07-06 NOTE — ANESTHESIA POSTPROCEDURE EVALUATION
Post-Op Assessment Note    CV Status:  Stable  Pain Score: 0    Pain management: adequate     Mental Status:  Alert and awake   Hydration Status:  Euvolemic   PONV Controlled:  Controlled   Airway Patency:  Patent   Post Op Vitals Reviewed: Yes      Staff: Anesthesiologist           /51 (07/06/20 1428)    Temp      Pulse 69 (07/06/20 1428)   Resp 18 (07/06/20 1428)    SpO2 99 % (07/06/20 1428)

## 2020-07-06 NOTE — ANESTHESIA POSTPROCEDURE EVALUATION
Post-Op Assessment Note    CV Status:  Stable  Pain Score: 0    Pain management: adequate     Mental Status:  Arousable   Hydration Status:  Stable   PONV Controlled:  None   Airway Patency:  Patent   Post Op Vitals Reviewed: Yes      Staff: Anesthesiologist, CRNA           BP   109/51   Temp      Pulse  68   Resp      SpO2   99

## 2020-07-06 NOTE — H&P
History and Physical -  [facility]  Arleen Mercer Torie 66 y o  female MRN: [id number]                  HPI: Robyn Giles is a 66y o  year old female who presents for EUS for assessment of pancreatic cyst      REVIEW OF SYSTEMS: Per the HPI, and otherwise unremarkable      Historical Information   Past Medical History:   Diagnosis Date    Breast cancer ([facility] [address] ) 2007    Bilateral mastectomy with chemotherapy    Hypertension     Hypertensive disorder 5/2/2019    Pancreatitis     Scoliosis      Past Surgical History:   Procedure Laterality Date    BASAL CELL CARCINOMA EXCISION      nose    BREAST BIOPSY      BREAST IMPLANT Bilateral     BREAST SURGERY      CARPAL TUNNEL RELEASE      CATARACT EXTRACTION      DILATION AND CURETTAGE OF UTERUS      GANGLION CYST EXCISION Left     wrist    HERNIA REPAIR      HIP SURGERY      HYSTERECTOMY      KNEE ARTHROSCOPY      KNEE SURGERY      MASTECTOMY Bilateral 2007    NASAL ENDOSCOPY W/ B[place]ON SINUPLASTY      x4     PLANTAR FASCIA SURGERY Bilateral 1990    RADIOFREQUENCY ABLATION      REFRACTIVE SURGERY      ROTATOR CUFF REPAIR Bilateral 2004 2005    TOTAL ABDOMINAL HYSTERECTOMY      w RSO    TRIGGER FINGER RELEASE      TUBAL LIGATION       Social History   Social History     Substance and Sexual Activity   Alcohol Use Not Currently    Comment: rare     Social History     Substance and Sexual Activity   Drug Use Never     Social History     Tobacco Use   Smoking Status Former Smoker   Smokeless Tobacco Never Used     Family History   Problem Relation Age of Onset    No Known Problems Mother     No Known Problems Father        Meds/Allergies       (Not in a hospital admission)    Allergies   Allergen Reactions    Dorzolamide Hcl-Timolol Mal Other (See Comments)    Doxazosin Shortness Of Breath    Gabapentin Other (See Comments)    Lorazepam Hallucinations    Amlodipine Itching    Amoxicillin-Pot Clavulanate Diarrhea    Buprenorphine Hcl Hallucinations    Carbidopa     Cefdinir Diarrhea    Celecoxib Diarrhea    Chlorthalidone Edema     Itchiness, facial swelling, rash    Clindamycin Diarrhea    Clonidine Other (See Comments)     Burning mouth/tongue    Denosumab     Hydrocodone Hallucinations    Hydrocodone-Acetaminophen     Ibandronic Acid     Levodopa     Milnacipran     Pregabalin     Ropinirole Headache    Rosuvastatin Itching    Tramadol Nausea Only    Rotigotine Rash       Objective     Blood pressure 141/65, pulse 67, temperature 98 3 °F (36 8 °C), temperature source Oral, resp  rate 18, height 4' 10" (1 473 m), weight 59 9 kg (132 lb), SpO2 97 %, not currently breastfeeding  PHYSICAL EXAM    Gen: NAD  CV: RRR  CHEST: Clear  ABD: soft, NT/ND  EXT: no edema      ASSESSMENT/PLAN:  This is a 66y o  year old female here for EUS , the patient is stable and optimized for the procedure, we reviewed risk and benefits   Risk include but not limited to infection, bleeding, perforation and missing a lesion    PLAN:   Procedure: EUS

## 2020-07-10 ENCOUNTER — OFFICE VISIT (OUTPATIENT)
Dept: OBGYN CLINIC | Facility: MEDICAL CENTER | Age: 75
End: 2020-07-10
Payer: MEDICARE

## 2020-07-10 ENCOUNTER — APPOINTMENT (OUTPATIENT)
Dept: RADIOLOGY | Facility: MEDICAL CENTER | Age: 75
End: 2020-07-10
Payer: MEDICARE

## 2020-07-10 VITALS — HEIGHT: 58 IN | BODY MASS INDEX: 27.71 KG/M2 | WEIGHT: 132 LBS | TEMPERATURE: 97.8 F

## 2020-07-10 DIAGNOSIS — M25.512 ACUTE PAIN OF LEFT SHOULDER: ICD-10-CM

## 2020-07-10 DIAGNOSIS — M25.512 ACUTE PAIN OF LEFT SHOULDER: Primary | ICD-10-CM

## 2020-07-10 PROCEDURE — 1160F RVW MEDS BY RX/DR IN RCRD: CPT | Performed by: ORTHOPAEDIC SURGERY

## 2020-07-10 PROCEDURE — 73030 X-RAY EXAM OF SHOULDER: CPT

## 2020-07-10 PROCEDURE — 3008F BODY MASS INDEX DOCD: CPT | Performed by: ORTHOPAEDIC SURGERY

## 2020-07-10 PROCEDURE — 3078F DIAST BP <80 MM HG: CPT | Performed by: ORTHOPAEDIC SURGERY

## 2020-07-10 PROCEDURE — 1036F TOBACCO NON-USER: CPT | Performed by: ORTHOPAEDIC SURGERY

## 2020-07-10 PROCEDURE — 3074F SYST BP LT 130 MM HG: CPT | Performed by: ORTHOPAEDIC SURGERY

## 2020-07-10 PROCEDURE — 99213 OFFICE O/P EST LOW 20 MIN: CPT | Performed by: ORTHOPAEDIC SURGERY

## 2020-07-10 NOTE — PROGRESS NOTES
Orthopaedic Surgery - Office Note  Walt Perez (89 y o  female)   : 1945   MRN: [id number]  Encounter Date: 7/10/2020    Chief Complaint   Patient presents with    Left Shoulder - Pain       Assessment / Plan  Left shoulder, stiffness s/p TSA 2019      · Activity as tolerated  · Home exercise program reviewed  · If symptoms do not improve we will consider a bone scan and/or a CT scan to check for implant loosening or stress fracture  Return if symptoms worsen or fail to improve  History of Present Illness  Walt Perez is a 66 y o  right hand dominant female who presents for initial evaluation of left shoulder pain and stiffness  She has recently moved to the area, who reports she had a total shoulder replacement in 2019 in [facility]  She states she is compliant with formal physical therapy until 2018 however she was never able to regain full range of motion and a pain-free status  She complains of intermittent moderate pain in her left shoulder  She is provided with pain relief with the use of ice and Tylenol  She also has hadher right shoulder replaced and she is very happy with her right shoulder range of motion  She does report her pain is a different pain from before her shoulder replacement  She denies any further injury or trauma to her left shoulder  She denies any distal paresthesias      Review of Systems  Pertinent items are noted in HPI  All other systems were reviewed and are negative  Physical Exam  Temp 97 8 °F (36 6 °C)   Ht 4' 10" (1 473 m)   Wt 59 9 kg (132 lb)   BMI 27 59 kg/m²   Cons: Appears well  No apparent distress  Psych: Alert  Oriented x3  Mood and affect normal   Eyes: PERRLA, EOMI  Resp: Normal effort  No audible wheezing or stridor  CV: Palpable pulse  No discernable arrhythmia  No LE edema  Lymph:  No palpable cervical, axillary, or inguinal lymphadenopathy  Skin: Warm  No palpable masses    No visible lesions  Neuro: Normal muscle tone  Normal and symmetric DTR's  Left Shoulder Exam  Alignment / Posture:  Normal shoulder posture  Inspection:  No swelling  Incision healed  Palpation:  acromion tenderness  ROM:  Shoulder   Shoulder ER 30  Shoulder IR gluteus  Strength:  Supraspinatus 4/5  Deltoid 4/5  Stability:  No objective shoulder instability  Tests: (-) Belly press  Neurovascular:  Sensation intact in Ax/R/M/U nerve distributions  2+ radial pulse  Studies Reviewed  XR of left shoulder -  I personally reviewed the x-rays that were obtained in the office today which demonstrated good alignment of left shoulder implant  Procedures  No procedures today  Medical, Surgical, Family, and Social History  The patient's medical history, family history, and social history, were reviewed and updated as appropriate      Past Medical History:   Diagnosis Date    Breast cancer [facility]) 2007    Bilateral mastectomy with chemotherapy    Hypertension     Hypertensive disorder 5/2/2019    Pancreatitis     Scoliosis        Past Surgical History:   Procedure Laterality Date    BASAL CELL CARCINOMA EXCISION      nose    BREAST BIOPSY      BREAST IMPLANT Bilateral     BREAST SURGERY      CARPAL TUNNEL RELEASE      CATARACT EXTRACTION      DILATION AND CURETTAGE OF UTERUS      GANGLION CYST EXCISION Left     wrist    HERNIA REPAIR      HIP SURGERY      HYSTERECTOMY      KNEE ARTHROSCOPY      KNEE SURGERY      MASTECTOMY Bilateral 2007    NASAL ENDOSCOPY W/ BALLON SINUPLASTY      x4     PLANTAR FASCIA SURGERY Bilateral 1990    RADIOFREQUENCY ABLATION      REFRACTIVE SURGERY      ROTATOR CUFF REPAIR Bilateral 2004 2005    TOTAL ABDOMINAL HYSTERECTOMY      w RSO    TRIGGER FINGER RELEASE      TUBAL LIGATION         Family History   Problem Relation Age of Onset    No Known Problems Mother     No Known Problems Father        Social History     Occupational History    Not on file Tobacco Use    Smoking status: Former Smoker    Smokeless tobacco: Never Used   Substance and Sexual Activity    Alcohol use: Not Currently     Comment: rare    Drug use: Never    Sexual activity: Not Currently       Allergies   Allergen Reactions    Dorzolamide Hcl-Timolol Mal Other (See Comments)    Doxazosin Shortness Of Breath    Gabapentin Other (See Comments)    Lorazepam Hallucinations    Amlodipine Itching    Amoxicillin-Pot Clavulanate Diarrhea    Buprenorphine Hcl Hallucinations    Carbidopa     Cefdinir Diarrhea    Celecoxib Diarrhea    Chlorthalidone Edema     Itchiness, facial swelling, rash    Clindamycin Diarrhea    Clonidine Other (See Comments)     Burning mouth/tongue    Denosumab     Hydrocodone Hallucinations    Hydrocodone-Acetaminophen     Ibandronic Acid     Levodopa     Milnacipran     Pregabalin     Ropinirole Headache    Rosuvastatin Itching    Tramadol Nausea Only    Rotigotine Rash         Current Outpatient Medications:     aspirin 81 MG tablet, Take 1 tablet by mouth daily, Disp: , Rfl:     azelastine (ASTELIN) 0 1 % nasal spray, 1 spray into each nostril 2 (two) times a day Use in each nostril as directed, Disp: 1 Bottle, Rfl: 5    benazepril (LOTENSIN) 40 MG tablet, Take 1 tablet by mouth daily, Disp: , Rfl:     Calcium Carb-Cholecalciferol (CALCIUM 600 + D PO), Take 1 tablet by mouth daily, Disp: , Rfl:     cetirizine (ZyrTEC) 10 mg tablet, Take 1 tablet (10 mg total) by mouth daily at bedtime, Disp: 30 tablet, Rfl: 5    Coenzyme Q10 (CO Q-10) 200 MG CAPS, Take 1 tablet by mouth daily, Disp: , Rfl:     escitalopram (LEXAPRO) 20 mg tablet, Take 1 tablet (20 mg total) by mouth daily, Disp: 30 tablet, Rfl: 3    fluorouracil (EFUDEX) 5 % cream, APPLY BID TO SKIN CANCER ON RIGHT CHEEK FOR 6 WEEKS, Disp: , Rfl:     latanoprost (XALATAN) 0 005 % ophthalmic solution, Administer 1 drop to both eyes daily, Disp: , Rfl:     MELATONIN PO, Take by mouth, Disp: , Rfl:     metoprolol succinate (TOPROL-XL) 50 mg 24 hr tablet, Take 1 tablet (50 mg total) by mouth daily, Disp: 90 tablet, Rfl: 1    Multiple Vitamins-Minerals (PRESERVISION/LUTEIN PO), PreserVision Lutein, Disp: , Rfl:     pancrelipase, Lip-Prot-Amyl, (CREON) 12,000 units capsule, Take 24,000 units of lipase by mouth 3 (three) times a day with meals, Disp: 270 capsule, Rfl: 2    Pancrelipase, Lip-Prot-Amyl, (Zenpep) 3000-96329 units CPEP, Take 14,000 units of lipase by mouth 3 (three) times a day with meals, Disp: 90 capsule, Rfl: 4    SIMBRINZA 1-0 2 % SUSP, INT 1 GTT IN OU BID   DISCONTINUE COMBIGAN, Disp: , Rfl:     hydrALAZINE (APRESOLINE) 25 mg tablet, Take 1 tablet (25 mg total) by mouth 3 (three) times a day, Disp: 90 tablet, Rfl: 3      Joleen Li    Scribe Attestation    I,:   Ministerio Arias am acting as a scribe while in the presence of the attending physician :        I,:   Katerina Omer MD personally performed the services described in this documentation    as scribed in my presence :

## 2020-07-13 NOTE — H&P (VIEW-ONLY)
[facility]  OTOLARYNGOLOGY - HEAD & NECK SURGERY    Mireille Greene  [id number]  1945    HISTORY & PHYSICAL    Chief Complaint   Patient presents with    Pre-op Exam        History of Present Illness: 78-year-old woman who presents for follow-up  She has a history of sinus problems, and states that she has had multiple procedures down in [place]  She continues to have issues breathing through both sides of her nose  She is scheduled for surgery later this month  In addition, the patient states that she recently noted to have some dizziness and vertigo sensation when she moves her head in a certain way  She believes that this is more towards the right side  She has had this problem in the past, and she states that her ENT in [place] did send her for physical therapy for BPPV and this helps  HISTORICALLY:  78-year-old woman who presents for further evaluation of sinus issues  The patient states that in the 1980s, she is having issues with allergies while living in [place]  She was on allergy shots for about 8 years she states that she did well at that time and moved to [place] in about 2005  The last 4 years that she was in [place], she developed some sinus issues  She underwent a balloon sinuplasty 4 different times  She states that it did help a little bit after each time, but in the past month she has had more problems  She just moved back to [place] in January  Right now she feels very congested with sometimes on and off yellow discolored nasal drainage  The left side is worse  Her smell is okay  No bleeding  She is tried things like Claritin and Mucinex  She has not been doing any steroid spray, because she has some concerns about her glaucoma  She states that her glaucoma right now is somewhat uncontrolled and was just added another eyedrop yesterday  She does not rinse  No other major issues at this time      Allergies   Allergen Reactions    Dorzolamide Hcl-Timolol Mal Other (See Comments)    Doxazosin Shortness Of Breath    Gabapentin Other (See Comments)    Lorazepam Hallucinations    Amlodipine Itching    Amoxicillin-Pot Clavulanate Diarrhea    Buprenorphine Hcl Hallucinations    Carbidopa     Cefdinir Diarrhea    Celecoxib Diarrhea    Chlorthalidone Edema     Itchiness, facial swelling, rash    Clindamycin Diarrhea    Clonidine Other (See Comments)     Burning mouth/tongue    Denosumab     Hydrocodone Hallucinations    Hydrocodone-Acetaminophen     Ibandronic Acid     Levodopa     Milnacipran     Pregabalin     Ropinirole Headache    Rosuvastatin Itching    Tramadol Nausea Only    Rotigotine Rash       Past Medical History:   Diagnosis Date    Breast cancer ([facility] [address] ) 2007    Bilateral mastectomy with chemotherapy    Hypertension     Hypertensive disorder 5/2/2019    Pancreatitis     Scoliosis        Past Surgical History:   Procedure Laterality Date    BASAL CELL CARCINOMA EXCISION      nose    BREAST BIOPSY      BREAST IMPLANT Bilateral     BREAST SURGERY      CARPAL TUNNEL RELEASE      CATARACT EXTRACTION      DILATION AND CURETTAGE OF UTERUS      GANGLION CYST EXCISION Left     wrist    HERNIA REPAIR      HIP SURGERY      HYSTERECTOMY      KNEE ARTHROSCOPY      KNEE SURGERY      MASTECTOMY Bilateral 2007    NASAL ENDOSCOPY W/ BALLON SINUPLASTY      x4     PLANTAR FASCIA SURGERY Bilateral 1990    RADIOFREQUENCY ABLATION      REFRACTIVE SURGERY      ROTATOR CUFF REPAIR Bilateral 2004 2005    TOTAL ABDOMINAL HYSTERECTOMY      w RSO    TRIGGER FINGER RELEASE      TUBAL LIGATION         Family History   Problem Relation Age of Onset    No Known Problems Mother     No Known Problems Father         Social History     Tobacco Use    Smoking status: Former Smoker    Smokeless tobacco: Never Used   Substance Use Topics    Alcohol use: Not Currently     Comment: rare    Drug use: Never       Current Outpatient Medications on File Prior to Visit   Medication Sig Dispense Refill    aspirin 81 MG tablet Take 1 tablet by mouth daily      benazepril (LOTENSIN) 40 MG tablet Take 1 tablet by mouth daily      Calcium Carb-Cholecalciferol (CALCIUM 600 + D PO) Take 1 tablet by mouth daily      escitalopram (LEXAPRO) 20 mg tablet Take 1 tablet (20 mg total) by mouth daily 30 tablet 3    latanoprost (XALATAN) 0 005 % ophthalmic solution Administer 1 drop to both eyes daily      metoprolol succinate (TOPROL-XL) 50 mg 24 hr tablet Take 1 tablet (50 mg total) by mouth daily 90 tablet 1    Multiple Vitamins-Minerals (PRESERVISION/LUTEIN PO) PreserVision Lutein      SIMBRINZA 1-0 2 % SUSP INT 1 GTT IN OU BID  DISCONTINUE COMBIGAN      azelastine (ASTELIN) 0 1 % nasal spray 1 spray into each nostril 2 (two) times a day Use in each nostril as directed (Patient not taking: Reported on 7/13/2020) 1 Bottle 5    cetirizine (ZyrTEC) 10 mg tablet Take 1 tablet (10 mg total) by mouth daily at bedtime (Patient not taking: Reported on 7/13/2020) 30 tablet 5    Coenzyme Q10 (CO Q-10) 200 MG CAPS Take 1 tablet by mouth daily      fluorouracil (EFUDEX) 5 % cream APPLY BID TO SKIN CANCER ON RIGHT CHEEK FOR 6 WEEKS      hydrALAZINE (APRESOLINE) 25 mg tablet Take 1 tablet (25 mg total) by mouth 3 (three) times a day 90 tablet 3    MELATONIN PO Take by mouth      pancrelipase, Lip-Prot-Amyl, (CREON) 12,000 units capsule Take 24,000 units of lipase by mouth 3 (three) times a day with meals (Patient not taking: Reported on 7/13/2020) 270 capsule 2    Pancrelipase, Lip-Prot-Amyl, (Zenpep) 3000-99977 units CPEP Take 14,000 units of lipase by mouth 3 (three) times a day with meals (Patient not taking: Reported on 7/13/2020) 90 capsule 4     No current facility-administered medications on file prior to visit  Review of Systems:  10-point ROS performed  Patient denies fevers or chills    All other systems reviewed and found to be negative unless otherwise noted in the HPI or MA note  Vitals:    07/13/20 1324   Temp: 98 3 °F (36 8 °C)   TempSrc: Temporal   Weight: 59 9 kg (132 lb)   Height: 4' 10" (1 473 m)         General Appearance: No apparent distress    Head: Normocephalic, atraumatic  Face: Symmetric without obvious lesions  Eyes: Conjunctiva clear, extraocular movements are intact  Ears: Pinna normal shape and position  Canals are clear  TMs intact with no middle ear effusion  Nose: External pyramid midline  Mucosa normal, turbinates mildly congested, septum slightly to the left  Oral cavity/Oropharynx: No mucosal lesions, masses, or pharyngeal asymmetry  Neck: No cervical lymphadenopathy or masses appreciated    Skin: Skin warm and dry  Neurological: Cranial nerves II to XII are intact  Respiratory: No stridor or labored breathing  Cardiovascular: Good perfusion of the upper extremities  No cyanosis of the fingers or hands  Psychiatric: Alert and oriented  Data Reviewed: CT scan is reviewed  Patient has edvin bullosa bilaterally  Inferior turbinate hypertrophy is present  Septum is slightly to the right  The patient has a right sphenoid sinusitis with osteitic bone  The contents of the sinus are completely opacified suspicious for fungus  Assessment:  1  Chronic maxillary sinusitis  PAT Covid Screening    CBC and differential    Basic metabolic panel    ECG 12 lead   2  Nasal congestion  PAT Covid Screening    CBC and differential    Basic metabolic panel    ECG 12 lead   3  Chronic ethmoidal sinusitis     4  Chronic frontal sinusitis     5  Chronic sphenoidal sinusitis     6  Seasonal allergic rhinitis due to pollen     7  Facial pressure     8  Atypical facial pain     9  Benign paroxysmal positional vertigo of right ear            Plan:  I reviewed the patient's CT scan with her  It is clear that she has a right chronic sphenoid sinusitis with osteitic bone    This is suspicious for fungus  The patient has had similar findings in the past   I do recommend given its appearance and her symptoms that we perform wide sphenoidotomy including drilling down the face of the sphenoid to evacuate this area  In addition, we will address some of her breathing issues with likely possible septoplasty, bilateral edvin bullectomy, and inferior turbinate reduction  I may need to open up some of the more anterior sinuses to get wide access to the sphenoid sinus wall  She will follow-up at the time of surgery  We will give her a referral to physical therapy for BPPV  Mica Suárez MD  Otolaryngology - Head & Neck Surgery  [facility]      ** Please Note: Dictation voice to text software may have been used in the creation of this document   **

## 2020-07-17 DIAGNOSIS — R09.81 NASAL CONGESTION: ICD-10-CM

## 2020-07-17 DIAGNOSIS — J32.0 CHRONIC MAXILLARY SINUSITIS: ICD-10-CM

## 2020-07-17 PROCEDURE — U0003 INFECTIOUS AGENT DETECTION BY NUCLEIC ACID (DNA OR RNA); SEVERE ACUTE RESPIRATORY SYNDROME CORONAVIRUS 2 (SARS-COV-2) (CORONAVIRUS DISEASE [COVID-19]), AMPLIFIED PROBE TECHNIQUE, MAKING USE OF HIGH THROUGHPUT TECHNOLOGIES AS DESCRIBED BY CMS-2020-01-R: HCPCS

## 2020-07-18 LAB
INPATIENT: NORMAL
SARS-COV-2 RNA SPEC QL NAA+PROBE: NOT DETECTED

## 2020-07-20 ENCOUNTER — CONSULT (OUTPATIENT)
Dept: INTERNAL MEDICINE CLINIC | Facility: CLINIC | Age: 75
End: 2020-07-20
Payer: MEDICARE

## 2020-07-20 VITALS
DIASTOLIC BLOOD PRESSURE: 74 MMHG | BODY MASS INDEX: 28.34 KG/M2 | HEART RATE: 64 BPM | HEIGHT: 58 IN | WEIGHT: 135 LBS | SYSTOLIC BLOOD PRESSURE: 124 MMHG | OXYGEN SATURATION: 96 % | TEMPERATURE: 98.2 F

## 2020-07-20 DIAGNOSIS — H81.13 BENIGN PAROXYSMAL POSITIONAL VERTIGO DUE TO BILATERAL VESTIBULAR DISORDER: ICD-10-CM

## 2020-07-20 DIAGNOSIS — Z96.651 HISTORY OF RIGHT KNEE JOINT REPLACEMENT: ICD-10-CM

## 2020-07-20 DIAGNOSIS — I65.21 STENOSIS OF RIGHT CAROTID ARTERY: ICD-10-CM

## 2020-07-20 DIAGNOSIS — R09.81 NASAL CONGESTION: ICD-10-CM

## 2020-07-20 DIAGNOSIS — M06.9 RHEUMATOID ARTHRITIS, INVOLVING UNSPECIFIED SITE, UNSPECIFIED RHEUMATOID FACTOR PRESENCE: ICD-10-CM

## 2020-07-20 DIAGNOSIS — Z01.818 PREOP EXAMINATION: ICD-10-CM

## 2020-07-20 DIAGNOSIS — F34.1 DYSTHYMIA: ICD-10-CM

## 2020-07-20 DIAGNOSIS — M47.817 LUMBOSACRAL SPONDYLOSIS WITHOUT MYELOPATHY: ICD-10-CM

## 2020-07-20 DIAGNOSIS — F41.9 ANXIETY: ICD-10-CM

## 2020-07-20 DIAGNOSIS — I10 ESSENTIAL HYPERTENSION: Primary | ICD-10-CM

## 2020-07-20 DIAGNOSIS — Z01.818 PRE-OP EVALUATION: ICD-10-CM

## 2020-07-20 DIAGNOSIS — J32.0 CHRONIC MAXILLARY SINUSITIS: ICD-10-CM

## 2020-07-20 DIAGNOSIS — M81.0 AGE-RELATED OSTEOPOROSIS WITHOUT CURRENT PATHOLOGICAL FRACTURE: ICD-10-CM

## 2020-07-20 DIAGNOSIS — H40.9 GLAUCOMA, UNSPECIFIED GLAUCOMA TYPE, UNSPECIFIED LATERALITY: ICD-10-CM

## 2020-07-20 DIAGNOSIS — E78.2 MIXED HYPERLIPIDEMIA: ICD-10-CM

## 2020-07-20 DIAGNOSIS — K21.9 GASTROESOPHAGEAL REFLUX DISEASE WITHOUT ESOPHAGITIS: ICD-10-CM

## 2020-07-20 DIAGNOSIS — K27.9 PUD (PEPTIC ULCER DISEASE): ICD-10-CM

## 2020-07-20 DIAGNOSIS — N18.30 CHRONIC RENAL INSUFFICIENCY, STAGE III (MODERATE) (HCC): ICD-10-CM

## 2020-07-20 DIAGNOSIS — M79.7 FIBROMYALGIA: ICD-10-CM

## 2020-07-20 PROBLEM — R07.89 ATYPICAL CHEST PAIN: Status: RESOLVED | Noted: 2020-06-03 | Resolved: 2020-07-20

## 2020-07-20 PROBLEM — M25.562 LEFT KNEE PAIN: Status: RESOLVED | Noted: 2020-06-30 | Resolved: 2020-07-20

## 2020-07-20 PROBLEM — J06.9 URI, ACUTE: Status: RESOLVED | Noted: 2020-05-06 | Resolved: 2020-07-20

## 2020-07-20 PROBLEM — M79.662 PAIN OF LEFT CALF: Status: RESOLVED | Noted: 2020-06-03 | Resolved: 2020-07-20

## 2020-07-20 PROBLEM — M25.561 RIGHT KNEE PAIN: Status: RESOLVED | Noted: 2020-06-30 | Resolved: 2020-07-20

## 2020-07-20 LAB
ANION GAP SERPL CALCULATED.3IONS-SCNC: 4 MMOL/L (ref 4–13)
BASOPHILS # BLD AUTO: 0.05 THOUSANDS/ΜL (ref 0–0.1)
BASOPHILS NFR BLD AUTO: 1 % (ref 0–1)
BUN SERPL-MCNC: 31 MG/DL (ref 5–25)
CALCIUM SERPL-MCNC: 9.4 MG/DL (ref 8.3–10.1)
CHLORIDE SERPL-SCNC: 106 MMOL/L (ref 100–108)
CO2 SERPL-SCNC: 26 MMOL/L (ref 21–32)
CREAT SERPL-MCNC: 1.16 MG/DL (ref 0.6–1.3)
EOSINOPHIL # BLD AUTO: 0.22 THOUSAND/ΜL (ref 0–0.61)
EOSINOPHIL NFR BLD AUTO: 4 % (ref 0–6)
ERYTHROCYTE [DISTWIDTH] IN BLOOD BY AUTOMATED COUNT: 14.3 % (ref 11.6–15.1)
GFR SERPL CREATININE-BSD FRML MDRD: 46 ML/MIN/1.73SQ M
GLUCOSE SERPL-MCNC: 97 MG/DL (ref 65–140)
HCT VFR BLD AUTO: 36.5 % (ref 34.8–46.1)
HGB BLD-MCNC: 11.2 G/DL (ref 11.5–15.4)
IMM GRANULOCYTES # BLD AUTO: 0.02 THOUSAND/UL (ref 0–0.2)
IMM GRANULOCYTES NFR BLD AUTO: 0 % (ref 0–2)
LYMPHOCYTES # BLD AUTO: 0.94 THOUSANDS/ΜL (ref 0.6–4.47)
LYMPHOCYTES NFR BLD AUTO: 15 % (ref 14–44)
MCH RBC QN AUTO: 29 PG (ref 26.8–34.3)
MCHC RBC AUTO-ENTMCNC: 30.7 G/DL (ref 31.4–37.4)
MCV RBC AUTO: 95 FL (ref 82–98)
MONOCYTES # BLD AUTO: 0.66 THOUSAND/ΜL (ref 0.17–1.22)
MONOCYTES NFR BLD AUTO: 11 % (ref 4–12)
NEUTROPHILS # BLD AUTO: 4.42 THOUSANDS/ΜL (ref 1.85–7.62)
NEUTS SEG NFR BLD AUTO: 69 % (ref 43–75)
NRBC BLD AUTO-RTO: 0 /100 WBCS
PLATELET # BLD AUTO: 287 THOUSANDS/UL (ref 149–390)
PMV BLD AUTO: 10.4 FL (ref 8.9–12.7)
POTASSIUM SERPL-SCNC: 4.8 MMOL/L (ref 3.5–5.3)
RBC # BLD AUTO: 3.86 MILLION/UL (ref 3.81–5.12)
SODIUM SERPL-SCNC: 136 MMOL/L (ref 136–145)
WBC # BLD AUTO: 6.31 THOUSAND/UL (ref 4.31–10.16)

## 2020-07-20 PROCEDURE — 85025 COMPLETE CBC W/AUTO DIFF WBC: CPT | Performed by: INTERNAL MEDICINE

## 2020-07-20 PROCEDURE — 93000 ELECTROCARDIOGRAM COMPLETE: CPT | Performed by: INTERNAL MEDICINE

## 2020-07-20 PROCEDURE — 99213 OFFICE O/P EST LOW 20 MIN: CPT | Performed by: INTERNAL MEDICINE

## 2020-07-20 PROCEDURE — 80048 BASIC METABOLIC PNL TOTAL CA: CPT | Performed by: INTERNAL MEDICINE

## 2020-07-20 PROCEDURE — 36415 COLL VENOUS BLD VENIPUNCTURE: CPT | Performed by: INTERNAL MEDICINE

## 2020-07-20 RX ORDER — BENAZEPRIL HYDROCHLORIDE 40 MG/1
40 TABLET, FILM COATED ORAL DAILY
Qty: 90 TABLET | Refills: 1 | Status: SHIPPED | OUTPATIENT
Start: 2020-07-20 | End: 2021-01-11

## 2020-07-20 NOTE — LETTER
Cardiology Pre Operative Clearance      PRE OPERATIVE CARDIAC RISK ASSESSMENT    07/20/20    Leonela Morse  1945  [id number]    Date of Surgery:  07/27/2020    Type of Surgery:  Maxillary Antrostomy    Surgeon:  Dr Sheba Molina    No Cardiac Contraindication for Planned Surgical Procedures    Anticoagulation: no    Physician Comment:  She is low risk for cardiovascular complications for this low risk procedure  No further cardiac testing needed at this time  Electronically Signed:   Jennifer Santoro MD

## 2020-07-20 NOTE — ASSESSMENT & PLAN NOTE
She is low risk for cardiovascular complication this low risk procedure  Will await CBC and BMP  Otherwise, no further testing needed at this time

## 2020-07-20 NOTE — PROGRESS NOTES
Subjective:  Chief Complaint   Patient presents with    Pre-op Exam     Patientis here for pre op clearance for sinus surgery at [facility] at [facility]  Pt knowles not have any new medical concerns  Mireille Greene is a 66y o  year old female who presents to the office today for a preoperative consultation at the request of surgeon Dr Ammon Shirley who plans on performing maxillary Antrostomy on July 27  This consultation is requested for the specific conditions prompting preoperative evaluation (i e  because of potential affect on operative risk)  Planned anesthesia: general  The patient has no known anesthesia issues  Patients bleeding risk: no recent abnormal bleeding  Patient does not have objections to receiving blood products if needed  Patient is able to walk 4 blocks without symptoms  Patient is able to walk up 2 flights of stairs without symptoms  Significant past medical history includes right bundle branch block, GERD, peptic ulcer disease, hypertension, right carotid artery stenosis, rheumatoid arthritis, osteoarthritis, right knee replacement, lumbosacral spondylosis, scoliosis, chronic kidney disease stage 3, fibromyalgia, hyperlipidemia, anxiety, dysthymia  Tobacco use: Former smoker, quit 40 years ago  Alcohol use:  Negative  Illicit drug use:  Negative    Symptoms:   Easy bleeding: no  Easy bruising: no  Frequent nose bleeds: no  Chest pain: no  Cough: no  Dyspnea on exertion:no  Edema: no  Palpitations: no  Wheezing: no    Living situation: Patient lives with herself in a residential home  Her grandson will be caring for her after surgery  shedoes not have post-op concerns       The following portions of the patient's history were reviewed and updated as appropriate: allergies, current medications, past family history, past medical history, past social history, past surgical history and problem list     Review of Systems  Review of Systems   Constitutional: Negative for activity change, appetite change, chills, diaphoresis, fatigue and fever  HENT: Negative for congestion, postnasal drip, rhinorrhea, sinus pressure, sinus pain, sneezing and sore throat  Eyes: Negative for visual disturbance  Respiratory: Negative for apnea, cough, choking, chest tightness, shortness of breath and wheezing  Cardiovascular: Negative for chest pain, palpitations and leg swelling  Gastrointestinal: Negative for abdominal distention, abdominal pain, anal bleeding, blood in stool, constipation, diarrhea, nausea and vomiting  Endocrine: Negative for cold intolerance and heat intolerance  Genitourinary: Negative for difficulty urinating, dysuria and hematuria  Musculoskeletal: Negative  Skin: Negative  Neurological: Negative for dizziness, weakness, light-headedness, numbness and headaches  Hematological: Negative for adenopathy  Psychiatric/Behavioral: Negative for agitation, sleep disturbance and suicidal ideas  All other systems reviewed and are negative          Past Medical History:   Diagnosis Date    Breast cancer [facility]) 2007    Bilateral mastectomy with chemotherapy    Hypertension     Hypertensive disorder 5/2/2019    Pancreatitis     Scoliosis      Past Surgical History:   Procedure Laterality Date    BASAL CELL CARCINOMA EXCISION      nose    BREAST BIOPSY      BREAST IMPLANT Bilateral     BREAST SURGERY      CARPAL TUNNEL RELEASE      CATARACT EXTRACTION      DILATION AND CURETTAGE OF UTERUS      GANGLION CYST EXCISION Left     wrist    HERNIA REPAIR      HIP SURGERY      HYSTERECTOMY      KNEE ARTHROSCOPY      KNEE SURGERY      MASTECTOMY Bilateral 2007    NASAL ENDOSCOPY W/ BALLON SINUPLASTY      x4     PLANTAR FASCIA SURGERY Bilateral 1990    RADIOFREQUENCY ABLATION      REFRACTIVE SURGERY      ROTATOR CUFF REPAIR Bilateral 2004 2005    TOTAL ABDOMINAL HYSTERECTOMY      w RSO    TRIGGER FINGER RELEASE      TUBAL LIGATION Social History     Tobacco Use    Smoking status: Former Smoker    Smokeless tobacco: Never Used   Substance Use Topics    Alcohol use: Not Currently     Comment: rare    Drug use: Never     Family History   Problem Relation Age of Onset    No Known Problems Mother     No Known Problems Father      Dorzolamide hcl-timolol mal; Doxazosin; Gabapentin; Lorazepam; Amlodipine; Amoxicillin-pot clavulanate; Buprenorphine hcl; Carbidopa; Cefdinir; Celecoxib; Chlorthalidone; Clindamycin; Clonidine; Denosumab; Hydrocodone; Hydrocodone-acetaminophen; Ibandronic acid; Levodopa; Milnacipran; Pregabalin; Ropinirole; Rosuvastatin; Tramadol; and Rotigotine  Current Outpatient Medications   Medication Sig Dispense Refill    aspirin 81 MG tablet Take 1 tablet by mouth daily      benazepril (LOTENSIN) 40 MG tablet Take 1 tablet (40 mg total) by mouth daily 90 tablet 1    Calcium Carb-Cholecalciferol (CALCIUM 600 + D PO) Take 1 tablet by mouth daily      Coenzyme Q10 (CO Q-10) 200 MG CAPS Take 1 tablet by mouth daily      escitalopram (LEXAPRO) 20 mg tablet Take 1 tablet (20 mg total) by mouth daily 30 tablet 3    fluorouracil (EFUDEX) 5 % cream APPLY BID TO SKIN CANCER ON RIGHT CHEEK FOR 6 WEEKS      hydrALAZINE (APRESOLINE) 25 mg tablet Take 1 tablet (25 mg total) by mouth 3 (three) times a day 90 tablet 3    latanoprost (XALATAN) 0 005 % ophthalmic solution Administer 1 drop to both eyes daily      metoprolol succinate (TOPROL-XL) 50 mg 24 hr tablet Take 1 tablet (50 mg total) by mouth daily 90 tablet 1    SIMBRINZA 1-0 2 % SUSP INT 1 GTT IN OU BID   DISCONTINUE COMBIGAN      azelastine (ASTELIN) 0 1 % nasal spray 1 spray into each nostril 2 (two) times a day Use in each nostril as directed (Patient not taking: Reported on 7/13/2020) 1 Bottle 5    cetirizine (ZyrTEC) 10 mg tablet Take 1 tablet (10 mg total) by mouth daily at bedtime (Patient not taking: Reported on 7/13/2020) 30 tablet 5    MELATONIN PO Take by mouth      Multiple Vitamins-Minerals (PRESERVISION/LUTEIN PO) PreserVision Lutein      pancrelipase, Lip-Prot-Amyl, (CREON) 12,000 units capsule Take 24,000 units of lipase by mouth 3 (three) times a day with meals (Patient not taking: Reported on 7/13/2020) 270 capsule 2    Pancrelipase, Lip-Prot-Amyl, (Zenpep) 3000-59484 units CPEP Take 14,000 units of lipase by mouth 3 (three) times a day with meals (Patient not taking: Reported on 7/13/2020) 90 capsule 4     No current facility-administered medications for this visit  Objective:       /74 (BP Location: Left arm, Patient Position: Sitting, Cuff Size: Standard)   Pulse 64   Temp 98 2 °F (36 8 °C) (Temporal)   Ht 4' 10" (1 473 m)   Wt 61 2 kg (135 lb)   SpO2 96%   BMI 28 22 kg/m²   Physical Exam   Constitutional: She is oriented to person, place, and time  She appears well-developed and well-nourished  No distress  HENT:   Head: Normocephalic and atraumatic  Eyes: Pupils are equal, round, and reactive to light  Conjunctivae and EOM are normal  Right eye exhibits no discharge  Left eye exhibits no discharge  Neck: Normal range of motion  Neck supple  No JVD present  No thyromegaly present  Cardiovascular: Normal rate, regular rhythm, normal heart sounds and intact distal pulses  Exam reveals no gallop and no friction rub  No murmur heard  Pulmonary/Chest: Effort normal and breath sounds normal  No respiratory distress  She has no wheezes  She has no rales  She exhibits no tenderness  Abdominal: Soft  She exhibits no distension  There is no tenderness  Musculoskeletal: Normal range of motion  She exhibits no edema, tenderness or deformity  Lymphadenopathy:     She has no cervical adenopathy  Neurological: She is alert and oriented to person, place, and time  No cranial nerve deficit  Coordination normal    Skin: Skin is warm and dry  No rash noted  She is not diaphoretic  No erythema  No pallor     Psychiatric: She has a normal mood and affect  Her behavior is normal  Judgment and thought content normal    Nursing note and vitals reviewed  Cardiographics  ECG: Right bundle branch block, normal sinus rhythm  Lab Review   Orders Only on 07/17/2020   Component Date Value    SARS-CoV-2  07/17/2020 Not Detected     Inpatient 07/17/2020 [address] Outpatient Visit on 07/06/2020   Component Date Value    Case Report 07/06/2020                      Value:Surgical Pathology Report                         Case: [id number]                                   Authorizing Provider:  Srikanth Fontenot MD             Collected:           07/06/2020 1421              Ordering Location:     [address] [place] [address]      Received:            07/06/2020 [facility]                                                           Pathologist:           Cari Angeles MD                                                                Specimen:    Stomach, incisura                                                                          Final Diagnosis 07/06/2020                      Value: This result contains rich text formatting which cannot be displayed here   Additional Information 07/06/2020                      Value: This result contains rich text formatting which cannot be displayed here  [facility] Gross Description 07/06/2020                      Value: This result contains rich text formatting which cannot be displayed here      Clinical Information 07/06/2020                      Value:Pancreatic insufficiency    Case Report 07/06/2020                      Value:Surgical Pathology Report                         Case: [id number]                                   Authorizing Provider:  Bety Ybarra MD  Collected:           07/06/2020 1415              Ordering Location:     [address] [place] [address]      Received:            07/06/2020 [facility] Pathologist:           Alesia Garcia MD                                                                 Specimens:   A) - Stomach, antral erosion                                                                        B) - Stomach, antrum                                                                       Final Diagnosis 07/06/2020                      Value: This result contains rich text formatting which cannot be displayed here   Additional Information 07/06/2020                      Value: This result contains rich text formatting which cannot be displayed here  [facility] Description 07/06/2020                      Value: This result contains rich text formatting which cannot be displayed here  Orders Only on 07/01/2020   Component Date Value    SARS-CoV-2  07/01/2020 Not Detected         Assessment:     66 y o  female with planned surgery as above  Known risk factors for perioperative complications: Renal dysfunction      Cardiac Risk Estimation:  Murray oVgt is cleared from a cardiovascular standpoint to proceed with surgery  she is at a low risk from a cardiovascular standpoint at this time without any additional cardiac testing  Reevaluation needed if he should present with symptoms prior to surgery  Plan:  Preoperative workup as follows none  Change in medication regimen before surgery: discontinue ASA 5 days before surgery       Assessment/Plan:    Problem List Items Addressed This Visit        Digestive    Gastroesophageal reflux disease without esophagitis    PUD (peptic ulcer disease)       Respiratory    Chronic maxillary sinusitis       Cardiovascular and Mediastinum    Essential hypertension - Primary    Relevant Medications    benazepril (LOTENSIN) 40 MG tablet    Stenosis of right carotid artery       Nervous and Auditory    Benign paroxysmal positional vertigo due to bilateral vestibular disorder       Musculoskeletal and Integument    Rheumatoid arthritis ([facility] [address] )    Osteoporosis    Lumbosacral spondylosis without myelopathy       Genitourinary    Chronic renal insufficiency, stage III (moderate) (HCC)       Other    Fibromyalgia    Glaucoma    Hyperlipidemia    Anxiety    Dysthymia    History of right knee joint replacement    Preop examination     She is low risk for cardiovascular complication this low risk procedure  Will await CBC and BMP  Otherwise, no further testing needed at this time             Other Visit Diagnoses     Pre-op evaluation        Relevant Orders    POCT ECG (Completed)

## 2020-07-20 NOTE — PRE-PROCEDURE INSTRUCTIONS
Pre-Surgery Instructions:   Medication Instructions    aspirin 81 MG tablet Instructed patient per Anesthesia Guidelines   benazepril (LOTENSIN) 40 MG tablet Instructed patient per Anesthesia Guidelines   Calcium Carb-Cholecalciferol (CALCIUM 600 + D PO) Instructed patient per Anesthesia Guidelines   Coenzyme Q10 (CO Q-10) 200 MG CAPS Instructed patient per Anesthesia Guidelines   escitalopram (LEXAPRO) 20 mg tablet Instructed patient per Anesthesia Guidelines   fluorouracil (EFUDEX) 5 % cream Instructed patient per Anesthesia Guidelines   hydrALAZINE (APRESOLINE) 25 mg tablet Instructed patient per Anesthesia Guidelines   latanoprost (XALATAN) 0 005 % ophthalmic solution Instructed patient per Anesthesia Guidelines   MELATONIN PO Instructed patient per Anesthesia Guidelines   metoprolol succinate (TOPROL-XL) 50 mg 24 hr tablet Instructed patient per Anesthesia Guidelines   Multiple Vitamins-Minerals (PRESERVISION/LUTEIN PO) Instructed patient per Anesthesia Guidelines   SIMBRINZA 1-0 2 % SUSP Instructed patient per Anesthesia Guidelines  Spoke to pt  Medication list reviewed & instructed   As of 7/20 pt to stop MV, coq10, calcium  Instructed on tylenol only   PCP instructed ok to discontinue aspirin 5 days prior (see [facility]), pt aware  Am DOS pt ok to take hydralazine & lexapro with small sip of water  (pt takes metoprolol @ hs)  Showering instructions provided by surgeon office, reviewed @ time of call  Negative COVID screening   All instructions verbally understood by patient  No further questions   Callback number provided

## 2020-07-21 ENCOUNTER — EVALUATION (OUTPATIENT)
Dept: PHYSICAL THERAPY | Facility: REHABILITATION | Age: 75
End: 2020-07-21
Payer: MEDICARE

## 2020-07-21 ENCOUNTER — ANESTHESIA EVENT (OUTPATIENT)
Dept: PERIOP | Facility: HOSPITAL | Age: 75
End: 2020-07-21
Payer: MEDICARE

## 2020-07-21 DIAGNOSIS — H81.11 BENIGN PAROXYSMAL VERTIGO OF RIGHT EAR: ICD-10-CM

## 2020-07-21 DIAGNOSIS — R42 DIZZINESS: Primary | ICD-10-CM

## 2020-07-21 DIAGNOSIS — H81.10 BENIGN PAROXYSMAL POSITIONAL VERTIGO, UNSPECIFIED LATERALITY: ICD-10-CM

## 2020-07-21 PROCEDURE — 95992 CANALITH REPOSITIONING PROC: CPT | Performed by: PHYSICAL THERAPIST

## 2020-07-21 PROCEDURE — 97162 PT EVAL MOD COMPLEX 30 MIN: CPT | Performed by: PHYSICAL THERAPIST

## 2020-07-21 NOTE — PROGRESS NOTES
PT Evaluation     Today's date: 2020  Patient name: Suzy Barber  : 1945  MRN: [id number]  Referring provider: Luzma Hannon MD  Dx:   Encounter Diagnosis     ICD-10-CM    1  Dizziness R42    2  Benign paroxysmal positional vertigo, unspecified laterality H81 10        Start Time: 0830  Stop Time: 0910  Total time in clinic (min): 40 minutes    Assessment  Assessment details: Pt is a 65 yo female with onset of dizziness over a week ago for no apparent reason  She has a history of BPPV occurring 3 times in the past   Symptoms occur with head turns and positional changes  [place]-Hallpike was positive for dizziness to the right but no nystagmus  Treated it with canalith repositioning  She is also going for sinus surgery next week so it is possible that sinus issues are causing some of her symptoms  Will f/u with her as necessary  Impairments: abnormal or restricted ROM  Other impairment: Dizziness    Symptom irritability: moderateUnderstanding of Dx/Px/POC: excellent  Goals  STG: in 2 week  Decrease episodes of dizziness  Improve posture  LTG: by discharge  No longer has dizziness  Able to walk several blocks confidently and safely    Plan  Patient would benefit from: skilled physical therapy  Planned therapy interventions: balance, canalith repositioning and home exercise program  Frequency: 2x week  Duration in weeks: 6  Treatment plan discussed with: patient        Subjective Evaluation    History of Present Illness  Date of onset: 7/10/2020  Patient Goals  Patient goals for therapy: improved balance  Patient goal: No longer feels dizzy        Objective     Concurrent Complaints  Positive for tinnitus, visual change, hearing loss and aural fullness (right)   Negative for headaches, nausea/motion sickness, memory loss, poor concentration and peripheral neuropathy    Active Range of Motion   Cervical/Thoracic Spine       Cervical    Flexion: Neck active flexion: C-ROM limited 50% except flexion limited 25%   Neuro Exam:     Dizziness  Positive for disequilibrium and vertigo  Negative for oscillopsia, motion sickness, light-headedness, rocking or swaying, diplopia and floating or swimming  Exacerbating factors  Positive for bending over, rolling in bed, looking up, turning head and supine to/from sitting  Negative for walking, optokinetic movement and walking in busy environment  Symptoms   Duration: seconds to minutes  Frequency: often at night    Headaches   Patient reports headaches: No      Cervical exam   Ligament Laxity Testing   Alar ligament: WNL  Sharp Rita:  WNL  Modified VBI   Left: asymptomatic  Right: asymptomatic    Oculomotor exam   Oculomotor ROM: WNL  Resting nystagmus: not present   Gaze holding nystagmus: not present left   Smooth pursuits: within normal limits  Vertical saccades: hypometric  Horizontal saccades: hypometric   Convergence: normal  Head thrust: left normal    Positional testing   [place]-Hallpike   Left posterior canal: WNL  Right posterior canal: symptomatic  Roll test   Left horizontal canal: WNL  Right horizontal canal: WNL      Balance assessments   MCTSIB   Eyes open level surface: normal 60"  Eyes open foam surface: sway 60"  Eyes closed level surface: Increased sway 30"  Eyes closed foam surface: 30" increased sway      Flowsheet Rows      Most Recent Value   PT/OT G-Codes   Current Score  62   Projected Score  79             Precautions: HTN      Manuals 7/21            Epleywinsome MIRANDA                                                   Neuro Re-Ed                                                                                                        Ther Ex                                                                                                                     Ther Activity                                       Gait Training                                       Modalities

## 2020-07-24 ENCOUNTER — APPOINTMENT (OUTPATIENT)
Dept: PHYSICAL THERAPY | Facility: REHABILITATION | Age: 75
End: 2020-07-24
Payer: MEDICARE

## 2020-07-24 ENCOUNTER — TELEPHONE (OUTPATIENT)
Dept: INTERNAL MEDICINE CLINIC | Age: 75
End: 2020-07-24

## 2020-07-24 ENCOUNTER — TELEPHONE (OUTPATIENT)
Dept: INTERNAL MEDICINE CLINIC | Facility: CLINIC | Age: 75
End: 2020-07-24

## 2020-07-24 NOTE — TELEPHONE ENCOUNTER
Dr Ze Vivar office called the office today for the pre op clearance forms for this patient  She had a pre op clearance appt with Dr Hiram Zepeda on 7/20/2020 and the pre op clearance form was never faxed back  They will be faxing a new form today  Please have Dr Yosi Randhawa fill it out and fax back ASAP today  The patients surgery is on Monday, July 27, 2020

## 2020-07-27 ENCOUNTER — ANESTHESIA (OUTPATIENT)
Dept: PERIOP | Facility: HOSPITAL | Age: 75
End: 2020-07-27
Payer: MEDICARE

## 2020-07-27 ENCOUNTER — HOSPITAL ENCOUNTER (OUTPATIENT)
Facility: HOSPITAL | Age: 75
Setting detail: OUTPATIENT SURGERY
Discharge: HOME | End: 2020-07-27
Attending: OTOLARYNGOLOGY | Admitting: OTOLARYNGOLOGY
Payer: MEDICARE

## 2020-07-27 VITALS
WEIGHT: 135 LBS | HEART RATE: 94 BPM | SYSTOLIC BLOOD PRESSURE: 150 MMHG | RESPIRATION RATE: 20 BRPM | DIASTOLIC BLOOD PRESSURE: 67 MMHG | BODY MASS INDEX: 28.34 KG/M2 | HEIGHT: 58 IN | OXYGEN SATURATION: 94 % | TEMPERATURE: 97.8 F

## 2020-07-27 DIAGNOSIS — J32.0 CHRONIC MAXILLARY SINUSITIS: ICD-10-CM

## 2020-07-27 DIAGNOSIS — R09.81 NASAL CONGESTION: ICD-10-CM

## 2020-07-27 PROCEDURE — 88305 TISSUE EXAM BY PATHOLOGIST: CPT | Performed by: PATHOLOGY

## 2020-07-27 PROCEDURE — 88311 DECALCIFY TISSUE: CPT | Performed by: PATHOLOGY

## 2020-07-27 PROCEDURE — 30520 REPAIR OF NASAL SEPTUM: CPT | Performed by: OTOLARYNGOLOGY

## 2020-07-27 PROCEDURE — 31240 NSL/SNS NDSC CNCH BULL RESCJ: CPT | Performed by: OTOLARYNGOLOGY

## 2020-07-27 PROCEDURE — 61782 SCAN PROC CRANIAL EXTRA: CPT | Performed by: OTOLARYNGOLOGY

## 2020-07-27 PROCEDURE — 30140 RESECT INFERIOR TURBINATE: CPT | Performed by: OTOLARYNGOLOGY

## 2020-07-27 PROCEDURE — 31288 NASAL/SINUS ENDOSCOPY SURG: CPT | Performed by: OTOLARYNGOLOGY

## 2020-07-27 RX ORDER — ONDANSETRON 2 MG/ML
INJECTION INTRAMUSCULAR; INTRAVENOUS AS NEEDED
Status: DISCONTINUED | OUTPATIENT
Start: 2020-07-27 | End: 2020-07-27 | Stop reason: SURG

## 2020-07-27 RX ORDER — SUCCINYLCHOLINE/SOD CL,ISO/PF 100 MG/5ML
SYRINGE (ML) INTRAVENOUS AS NEEDED
Status: DISCONTINUED | OUTPATIENT
Start: 2020-07-27 | End: 2020-07-27 | Stop reason: SURG

## 2020-07-27 RX ORDER — ACETAMINOPHEN 500 MG
500 TABLET ORAL EVERY 6 HOURS PRN
Qty: 30 TABLET | Refills: 0 | COMMUNITY
Start: 2020-07-27 | End: 2021-10-25

## 2020-07-27 RX ORDER — SODIUM CHLORIDE 9 MG/ML
INJECTION, SOLUTION INTRAVENOUS CONTINUOUS PRN
Status: DISCONTINUED | OUTPATIENT
Start: 2020-07-27 | End: 2020-07-27 | Stop reason: SURG

## 2020-07-27 RX ORDER — FENTANYL CITRATE/PF 50 MCG/ML
25 SYRINGE (ML) INJECTION
Status: DISCONTINUED | OUTPATIENT
Start: 2020-07-27 | End: 2020-07-27 | Stop reason: HOSPADM

## 2020-07-27 RX ORDER — LIDOCAINE HYDROCHLORIDE AND EPINEPHRINE 10; 10 MG/ML; UG/ML
INJECTION, SOLUTION INFILTRATION; PERINEURAL AS NEEDED
Status: DISCONTINUED | OUTPATIENT
Start: 2020-07-27 | End: 2020-07-27 | Stop reason: HOSPADM

## 2020-07-27 RX ORDER — PROPOFOL 10 MG/ML
INJECTION, EMULSION INTRAVENOUS AS NEEDED
Status: DISCONTINUED | OUTPATIENT
Start: 2020-07-27 | End: 2020-07-27 | Stop reason: SURG

## 2020-07-27 RX ORDER — PROPOFOL 10 MG/ML
INJECTION, EMULSION INTRAVENOUS CONTINUOUS PRN
Status: DISCONTINUED | OUTPATIENT
Start: 2020-07-27 | End: 2020-07-27 | Stop reason: SURG

## 2020-07-27 RX ORDER — LIDOCAINE HYDROCHLORIDE 10 MG/ML
0.5 INJECTION, SOLUTION EPIDURAL; INFILTRATION; INTRACAUDAL; PERINEURAL ONCE AS NEEDED
Status: COMPLETED | OUTPATIENT
Start: 2020-07-27 | End: 2020-07-27

## 2020-07-27 RX ORDER — FENTANYL CITRATE 50 UG/ML
INJECTION, SOLUTION INTRAMUSCULAR; INTRAVENOUS AS NEEDED
Status: DISCONTINUED | OUTPATIENT
Start: 2020-07-27 | End: 2020-07-27 | Stop reason: SURG

## 2020-07-27 RX ORDER — ONDANSETRON 2 MG/ML
4 INJECTION INTRAMUSCULAR; INTRAVENOUS ONCE AS NEEDED
Status: DISCONTINUED | OUTPATIENT
Start: 2020-07-27 | End: 2020-07-27 | Stop reason: HOSPADM

## 2020-07-27 RX ORDER — SODIUM CHLORIDE, SODIUM LACTATE, POTASSIUM CHLORIDE, CALCIUM CHLORIDE 600; 310; 30; 20 MG/100ML; MG/100ML; MG/100ML; MG/100ML
20 INJECTION, SOLUTION INTRAVENOUS CONTINUOUS
Status: DISCONTINUED | OUTPATIENT
Start: 2020-07-27 | End: 2020-07-27 | Stop reason: HOSPADM

## 2020-07-27 RX ORDER — GINSENG 100 MG
CAPSULE ORAL AS NEEDED
Status: DISCONTINUED | OUTPATIENT
Start: 2020-07-27 | End: 2020-07-27 | Stop reason: HOSPADM

## 2020-07-27 RX ORDER — LIDOCAINE HYDROCHLORIDE 10 MG/ML
INJECTION, SOLUTION EPIDURAL; INFILTRATION; INTRACAUDAL; PERINEURAL AS NEEDED
Status: DISCONTINUED | OUTPATIENT
Start: 2020-07-27 | End: 2020-07-27 | Stop reason: SURG

## 2020-07-27 RX ORDER — EPHEDRINE SULFATE 50 MG/ML
INJECTION INTRAVENOUS AS NEEDED
Status: DISCONTINUED | OUTPATIENT
Start: 2020-07-27 | End: 2020-07-27 | Stop reason: SURG

## 2020-07-27 RX ORDER — DEXAMETHASONE SODIUM PHOSPHATE 10 MG/ML
INJECTION, SOLUTION INTRAMUSCULAR; INTRAVENOUS AS NEEDED
Status: DISCONTINUED | OUTPATIENT
Start: 2020-07-27 | End: 2020-07-27 | Stop reason: SURG

## 2020-07-27 RX ORDER — SCOLOPAMINE TRANSDERMAL SYSTEM 1 MG/1
1 PATCH, EXTENDED RELEASE TRANSDERMAL ONCE
Status: COMPLETED | OUTPATIENT
Start: 2020-07-27 | End: 2020-07-27

## 2020-07-27 RX ADMIN — LIDOCAINE HYDROCHLORIDE 0.2 ML: 10 INJECTION, SOLUTION EPIDURAL; INFILTRATION; INTRACAUDAL; PERINEURAL at 13:30

## 2020-07-27 RX ADMIN — PHENYLEPHRINE HYDROCHLORIDE 100 MCG: 10 INJECTION INTRAVENOUS at 15:57

## 2020-07-27 RX ADMIN — SODIUM CHLORIDE, SODIUM LACTATE, POTASSIUM CHLORIDE, AND CALCIUM CHLORIDE 20 ML/HR: .6; .31; .03; .02 INJECTION, SOLUTION INTRAVENOUS at 13:30

## 2020-07-27 RX ADMIN — PHENYLEPHRINE HYDROCHLORIDE 20 MCG/MIN: 10 INJECTION INTRAVENOUS at 16:11

## 2020-07-27 RX ADMIN — EPHEDRINE SULFATE 10 MG: 50 INJECTION, SOLUTION INTRAVENOUS at 16:08

## 2020-07-27 RX ADMIN — DEXAMETHASONE SODIUM PHOSPHATE 10 MG: 10 INJECTION, SOLUTION INTRAMUSCULAR; INTRAVENOUS at 16:00

## 2020-07-27 RX ADMIN — FENTANYL CITRATE 50 MCG: 50 INJECTION, SOLUTION INTRAMUSCULAR; INTRAVENOUS at 15:55

## 2020-07-27 RX ADMIN — EPHEDRINE SULFATE 10 MG: 50 INJECTION, SOLUTION INTRAVENOUS at 17:00

## 2020-07-27 RX ADMIN — ONDANSETRON 4 MG: 2 INJECTION INTRAMUSCULAR; INTRAVENOUS at 16:05

## 2020-07-27 RX ADMIN — FENTANYL CITRATE 25 MCG: 50 INJECTION, SOLUTION INTRAMUSCULAR; INTRAVENOUS at 16:35

## 2020-07-27 RX ADMIN — EPHEDRINE SULFATE 5 MG: 50 INJECTION, SOLUTION INTRAVENOUS at 17:57

## 2020-07-27 RX ADMIN — REMIFENTANIL HYDROCHLORIDE 0.1 MCG/KG/MIN: 1 INJECTION, POWDER, LYOPHILIZED, FOR SOLUTION INTRAVENOUS at 16:01

## 2020-07-27 RX ADMIN — SCOPALAMINE 1 PATCH: 1 PATCH, EXTENDED RELEASE TRANSDERMAL at 14:57

## 2020-07-27 RX ADMIN — PROPOFOL 140 MG: 10 INJECTION, EMULSION INTRAVENOUS at 15:57

## 2020-07-27 RX ADMIN — PROPOFOL 100 MCG/KG/MIN: 10 INJECTION, EMULSION INTRAVENOUS at 16:01

## 2020-07-27 RX ADMIN — SODIUM CHLORIDE: 0.9 INJECTION, SOLUTION INTRAVENOUS at 16:00

## 2020-07-27 RX ADMIN — SODIUM CHLORIDE, SODIUM LACTATE, POTASSIUM CHLORIDE, AND CALCIUM CHLORIDE: .6; .31; .03; .02 INJECTION, SOLUTION INTRAVENOUS at 17:47

## 2020-07-27 RX ADMIN — Medication 80 MG: at 15:57

## 2020-07-27 RX ADMIN — EPHEDRINE SULFATE 5 MG: 50 INJECTION, SOLUTION INTRAVENOUS at 17:04

## 2020-07-27 RX ADMIN — LIDOCAINE HYDROCHLORIDE 50 MG: 10 INJECTION, SOLUTION EPIDURAL; INFILTRATION; INTRACAUDAL; PERINEURAL at 15:55

## 2020-07-27 RX ADMIN — EPHEDRINE SULFATE 5 MG: 50 INJECTION, SOLUTION INTRAVENOUS at 16:11

## 2020-07-27 NOTE — ANESTHESIA PREPROCEDURE EVALUATION
Review of Systems/Medical History  Patient summary reviewed  Chart reviewed  History of anesthetic complications PONV    Cardiovascular  Hyperlipidemia, Hypertension ,    Pulmonary  Negative pulmonary ROS        GI/Hepatic    PUD, GERD well controlled,        Chronic kidney disease stage 3,        Endo/Other  Negative endo/other ROS      GYN  Negative gynecology ROS          Hematology   Musculoskeletal  Rheumatoid arthritis , Scoliosis , Osteoarthritis,   Arthritis     Neurology    Fibromyalgia   Psychology   Anxiety,              Physical Exam    Airway    Mallampati score: III  TM Distance: >3 FB  Neck ROM: full     Dental   No notable dental hx     Cardiovascular  Rhythm: regular, Rate: normal, Cardiovascular exam normal    Pulmonary  Pulmonary exam normal Breath sounds clear to auscultation,     Other Findings        Anesthesia Plan  ASA Score- 2     Anesthesia Type- general with ASA Monitors  Additional Monitors:   Airway Plan: ETT  Plan Factors-  Patient did not smoke on day of surgery  Induction- intravenous and rapid sequence induction  Postoperative Plan- Plan for postoperative opioid use  Planned trial extubation    Informed Consent- Anesthetic plan and risks discussed with patient  I personally reviewed this patient with the CRNA  Discussed and agreed on the Anesthesia Plan with the CRNA  Payton Gamboa

## 2020-07-27 NOTE — DISCHARGE INSTRUCTIONS
POST-OPERATIVE CARE INSTRUCTION SHEET      ABOUT POST-OPERATIVE CARE VISITS    Post-operative visits are an indispensable part of the surgery, since they help promote healing and prevent persistent or recurrent disease  You should plan on remaining within the [facility] area for at least one day following surgery  You will usually be seen within 7-10 days post-operatively for debridement (removal of crusts from your nose)  You should anticipate 2 office visits over the first 4 weeks after surgery  Continued debridement will be done at these visits, and persistent inflammation or scar tissue will be removed under local anesthesia  Although chances of complications from these manipulations are rare, the potential risks are the same as the surgery itself  WHAT YOU CAN EXPECT TO EXPERIENCE    You can expect some bleeding from your nose for several days after the surgery and again after each office debridement  When bleeding occurs down the front of your nose or into the back of your throat, you should tilt your head back while sitting up and breathe gently through your nose  If bleeding persists for an extended period of time notify our office  Over-the-counter Afrin nose spray (oxymetazoline) can be used to open your nose  and reduce bleeding during the first two nights after surgery if needed  As the sinuses begin to clear themselves, you can expect to have some thick brown drainage from your nose  This is mucus and old blood and does not indicate an infection  You may experience some discomfort post-operatively due to manipulation and inflammation  Take your pain medication as directed  Often extra-strength Tylenolâ is sufficient  You may wish to take medication for pain prior to your post-operative visits (particularly early on, when the nose is most sensitive)  If the medication is sedating, be sure to have someone available to drive you        SOME IMPORTANT POST-OPERATIVE DO'S AND DO NOT'S    DO NOT blow your nose until you have been given permission to do so  DO NOT bend, lift or strain for at least one week after surgery  These activities will promote bleeding from your nose  You should not plan on participating in rigorous activity until healing is completed  DO NOT suppress the need to cough or sneeze, but cough/sneeze with your mouth open  DO NOT resume use of any aspirin-containing products or other blood thinners until after discussing this with us  Typically, you can restart after 7-10 days  DO use nasal saline spray (without decongestant) every hour while you are awake beginning the day after surgery  This helps moisten your nose and prevents large crusts from forming  The preferred brand is Simply Saline due to lack of a preservative which is irritating to some people  DO use nasal saline irrigation 4-6 times daily beginning on post-operative day three if there is no nasal packing  DO continue your steroids (Medrol or Prednisone) and antibiotics (if they have been prescribed for you)  Diarrhea from antibiotic usage can lead to a serious health problem  This can often be prevented by taking probiotics daily, which is found in yogurt with active cultures or as tablets in a health food store  If you should experience diarrhea, stop the antibiotic and notify us  Further evaluation may be required  DO notify us for any of the following: temperature elevations above 100 5 F, clear watery drainage from your nose, changes in vision, swelling of the eyes, worsening headache or neck stiffness  Contact our office for an emergency or go to the emergency room nearest to you

## 2020-07-27 NOTE — ANESTHESIA POSTPROCEDURE EVALUATION
Post-Op Assessment Note    CV Status:  Stable  Pain Score: 0    Pain management: adequate     Mental Status:  Alert and awake   Hydration Status:  Euvolemic   PONV Controlled:  Controlled   Airway Patency:  Patent   Post Op Vitals Reviewed: Yes      Staff: CRNA           /68 (07/27/20 1820)    Temp (!) 96 8 °F (36 °C) (07/27/20 1820)    Pulse 81 (07/27/20 1820)   Resp 19 (07/27/20 1820)    SpO2 99 % (07/27/20 1820)

## 2020-07-27 NOTE — DISCHARGE INSTR - AVS FIRST PAGE
POST-OPERATIVE CARE INSTRUCTION SHEET      ABOUT POST-OPERATIVE CARE VISITS    Post-operative visits are an indispensable part of the surgery, since they help promote healing and prevent persistent or recurrent disease  You should plan on remaining within the [facility] area for at least one day following surgery  You will usually be seen within 7-10 days post-operatively for debridement (removal of crusts from your nose)  You should anticipate 2 office visits over the first 4 weeks after surgery  Continued debridement will be done at these visits, and persistent inflammation or scar tissue will be removed under local anesthesia  Although chances of complications from these manipulations are rare, the potential risks are the same as the surgery itself  WHAT YOU CAN EXPECT TO EXPERIENCE    You can expect some bleeding from your nose for several days after the surgery and again after each office debridement  When bleeding occurs down the front of your nose or into the back of your throat, you should tilt your head back while sitting up and breathe gently through your nose  If bleeding persists for an extended period of time notify our office  Over-the-counter Afrin nose spray (oxymetazoline) can be used to open your nose  and reduce bleeding during the first two nights after surgery if needed  As the sinuses begin to clear themselves, you can expect to have some thick brown drainage from your nose  This is mucus and old blood and does not indicate an infection  You may experience some discomfort post-operatively due to manipulation and inflammation  Take your pain medication as directed  Often extra-strength Tylenolâ is sufficient  You may wish to take medication for pain prior to your post-operative visits (particularly early on, when the nose is most sensitive)  If the medication is sedating, be sure to have someone available to drive you        SOME IMPORTANT POST-OPERATIVE DO'S AND DO NOT'S    DO NOT blow your nose until you have been given permission to do so  DO NOT bend, lift or strain for at least one week after surgery  These activities will promote bleeding from your nose  You should not plan on participating in rigorous activity until healing is completed  DO NOT suppress the need to cough or sneeze, but cough/sneeze with your mouth open  DO NOT resume use of any aspirin-containing products or other blood thinners until after discussing this with us  Typically, you can restart after 7-10 days  DO use nasal saline spray (without decongestant) every hour while you are awake beginning the day after surgery  This helps moisten your nose and prevents large crusts from forming  The preferred brand is Simply Saline due to lack of a preservative which is irritating to some people  DO use nasal saline irrigation 4-6 times daily beginning on post-operative day three if there is no nasal packing  DO continue your steroids (Medrol or Prednisone) and antibiotics (if they have been prescribed for you)  Diarrhea from antibiotic usage can lead to a serious health problem  This can often be prevented by taking probiotics daily, which is found in yogurt with active cultures or as tablets in a health food store  If you should experience diarrhea, stop the antibiotic and notify us  Further evaluation may be required  DO notify us for any of the following: temperature elevations above 100 5 F, clear watery drainage from your nose, changes in vision, swelling of the eyes, worsening headache or neck stiffness  Contact our office for an emergency or go to the emergency room nearest to you

## 2020-07-27 NOTE — INTERVAL H&P NOTE
H&P reviewed  After examining the patient I find no changes in the patients condition since the H&P had been written      Vitals:    07/27/20 1255   BP: 136/70   Pulse: 70   Resp: 18   Temp: 98 5 °F (36 9 °C)   SpO2: 97%     Heart: RRR  Lungs: CTAB    Plan:  FESS, Septoplasty

## 2020-07-29 NOTE — OP NOTE
OPERATIVE REPORT  PATIENT NAME: Jackie Avery    :  1945  MRN: [id number]  Pt Location: [facility] OR ROOM 05    SURGERY DATE: 2020    Surgeon(s) and Role:     * Tena Andrade MD - Primary    Preop Diagnosis:  Chronic sphenoid sinusitis [J32 0]  Nasal congestion [R09 81]  Nasal obstruction  Deviated nasal septum  Bilateral edvin bullectomy    Post-Op Diagnosis Codes:  Same    Procedure(s) (LRB):  Functional endoscopic sinus surgery includin  Bilateral edvin bullectomy  2  Right sphenoidotomy with removal of tissue  3  Septoplasty  4  Submucous resection of inferior turbinates with therapeutic outfracture  5  Use of image guidance navigation    Specimen(s):  ID Type Source Tests Collected by Time Destination   1 : sinus contents Tissue Maxillary Sinus TISSUE EXAM Tena Andrade MD 2020  5:47 PM        Estimated Blood Loss:   Minimal    Drains:  * No LDAs found *    Anesthesia Type:   General    Operative Indications:  Chronic sphenoid sinusitis [J32 0]  Nasal congestion [R09 81]  Nasal obstruction  Deviated nasal septum  Bilateral edvin bullectomy    Operative Findings:  Patient had deviated septum high and to the right side  She had inferior turbinate hypertrophy  Bilateral edvin bullectomy were seen, left greater than right side  Right sphenoid sinus very osteitic  Complications:   None    Procedure and Technique:  After the patient was allowed to ask any remaining questions in the preoperative area, the patient was escorted to the operating suite by both ENT and anesthesia  There, a surgical timeout was performed to ensure the proper patient and procedure  Once this was done, general endotracheal anesthesia was induced without incident  Once given the go ahead by anesthesia, the head of the bed was rotated 100°  The Med[employer]onic Fusion navigation device was applied to the patient's forehead and registered without incident  Afrin soaked pledgets were placed bilaterally  After a time, they were removed  1% lidocaine with epinephrine 1-100,000 was injected into the patient's nasal septum, inferior turbinate  Afrin pledgets were then replaced  The patient was then prepped and draped in normal fashion for the above procedures  After time was given the pledgets were removed  I injected additional 1% lidocaine with epinephrine 1-100,000 into the patient's nasal septum, middle turbinate, and lateral wall  A left-sided hemitransfixion was then made and a submucoperichondrial and submucoperiosteal flap underlying cartilage and bone  The cartilage knife was then used to incise the cartilage just anterior to the start of septal deviation  A similar flap was then raised on the right side  Using various instruments including Ebony elevator, cinthyabill forceps, Jose G-Bandar forceps, the deviated portions of the cartilage and bone were normal without incident once the nasal airway was improved, I turned my attention to the right side  The patient did have a edvin bullectomy on this side was also the side of the sclerosed sphenoid sinus  The shaver was used to debulk the lateral side of the edvin bullectomy  In addition, endoscopic scissors were used to resect the inferior portion of the middle turbinate the superior turbinate was then lateralized revealing the sphenoid face  There was a depression more superiorly which likely was the location of her previous balloon sinuplasty  I injected the sphenoid face with additional lidocaine and epinephrine  After time was given, I used a 15° dio bur drill to drill down the face of the sphenoid  I also have the drill through thick osteitic bone to open the sphenoid sinus up widely  I did not see any particular fungal debris, but we did evacuate the sphenoid sinus of some soft tissue and a small amount of fluid  Bleeding was controlled with suction Bovie cautery as well as Afrin-soaked pledgets    Attention was then turned to the right side, where a edvin bullectomy was performed  A sickle knife was used to enter into the edvin bullectomy  Endoscopic scissors were used to resect the lateral portion of the edvin bullectomy  An endoscopic shaver was then used to debulk it further  Anterior stab incision was then made into the head of the bilateral inferior turbinates Ebony elevator was used to raise a flap over the underlying turbinate bone  The turbinate shaver was then used to debulk the turbinates bilaterally until adequate reduction was achieved  A [place] elevator was then used to outfracture of the turbinates for even better improvement of the nasal airway  The hemitransfixion incision was then closed with chromic gut suture  Plain gut suture was then used to make a basting stitch to approximate the septal flaps  PosiSepX was placed in the left middle meatus, and in the right sphenoethmoid recess  The patient was then turned over to anesthesia and allowed to awaken without incident       I was present for the entire procedure    Patient Disposition:  PACU     SIGNATURE: Nikolay Helms MD  DATE: July 29, 2020  TIME: 9:07 AM

## 2020-07-31 ENCOUNTER — TELEPHONE (OUTPATIENT)
Dept: GASTROENTEROLOGY | Facility: MEDICAL CENTER | Age: 75
End: 2020-07-31

## 2020-07-31 NOTE — TELEPHONE ENCOUNTER
----- Message from Kuldeep Mcconnell MD sent at 7/27/2020 12:39 PM EDT -----  Please call patient :  Biopsies unremarkable  How is she feeling on increased creon dose? Thanks

## 2020-08-13 ENCOUNTER — HOSPITAL ENCOUNTER (OUTPATIENT)
Dept: RADIOLOGY | Facility: HOSPITAL | Age: 75
Discharge: HOME | End: 2020-08-13
Attending: ORTHOPAEDIC SURGERY
Payer: MEDICARE

## 2020-08-13 ENCOUNTER — OFFICE VISIT (OUTPATIENT)
Dept: OBGYN CLINIC | Facility: HOSPITAL | Age: 75
End: 2020-08-13
Payer: MEDICARE

## 2020-08-13 VITALS
WEIGHT: 137 LBS | HEIGHT: 58 IN | HEART RATE: 80 BPM | DIASTOLIC BLOOD PRESSURE: 77 MMHG | SYSTOLIC BLOOD PRESSURE: 178 MMHG | BODY MASS INDEX: 28.76 KG/M2

## 2020-08-13 DIAGNOSIS — Z96.651 HISTORY OF RIGHT KNEE JOINT REPLACEMENT: ICD-10-CM

## 2020-08-13 DIAGNOSIS — M70.61 GREATER TROCHANTERIC BURSITIS OF RIGHT HIP: ICD-10-CM

## 2020-08-13 DIAGNOSIS — M25.551 PAIN IN RIGHT HIP: Primary | ICD-10-CM

## 2020-08-13 DIAGNOSIS — M25.551 PAIN IN RIGHT HIP: ICD-10-CM

## 2020-08-13 PROBLEM — Z96.641 HISTORY OF HIP REPLACEMENT, TOTAL, RIGHT: Status: ACTIVE | Noted: 2020-08-13

## 2020-08-13 PROBLEM — M79.651 RIGHT THIGH PAIN: Status: ACTIVE | Noted: 2020-08-13

## 2020-08-13 PROCEDURE — 99213 OFFICE O/P EST LOW 20 MIN: CPT | Performed by: ORTHOPAEDIC SURGERY

## 2020-08-13 PROCEDURE — 1160F RVW MEDS BY RX/DR IN RCRD: CPT | Performed by: ORTHOPAEDIC SURGERY

## 2020-08-13 PROCEDURE — 3077F SYST BP >= 140 MM HG: CPT | Performed by: ORTHOPAEDIC SURGERY

## 2020-08-13 PROCEDURE — 3008F BODY MASS INDEX DOCD: CPT | Performed by: ORTHOPAEDIC SURGERY

## 2020-08-13 PROCEDURE — 20610 DRAIN/INJ JOINT/BURSA W/O US: CPT | Performed by: ORTHOPAEDIC SURGERY

## 2020-08-13 PROCEDURE — 3078F DIAST BP <80 MM HG: CPT | Performed by: ORTHOPAEDIC SURGERY

## 2020-08-13 PROCEDURE — 1036F TOBACCO NON-USER: CPT | Performed by: ORTHOPAEDIC SURGERY

## 2020-08-13 PROCEDURE — 73502 X-RAY EXAM HIP UNI 2-3 VIEWS: CPT

## 2020-08-13 RX ORDER — BUPIVACAINE HYDROCHLORIDE 2.5 MG/ML
2 INJECTION, SOLUTION INFILTRATION; PERINEURAL
Status: COMPLETED | OUTPATIENT
Start: 2020-08-13 | End: 2020-08-13

## 2020-08-13 RX ORDER — LIDOCAINE HYDROCHLORIDE 10 MG/ML
2 INJECTION, SOLUTION INFILTRATION; PERINEURAL
Status: COMPLETED | OUTPATIENT
Start: 2020-08-13 | End: 2020-08-13

## 2020-08-13 RX ORDER — BETAMETHASONE SODIUM PHOSPHATE AND BETAMETHASONE ACETATE 3; 3 MG/ML; MG/ML
6 INJECTION, SUSPENSION INTRA-ARTICULAR; INTRALESIONAL; INTRAMUSCULAR; SOFT TISSUE
Status: COMPLETED | OUTPATIENT
Start: 2020-08-13 | End: 2020-08-13

## 2020-08-13 RX ADMIN — BUPIVACAINE HYDROCHLORIDE 2 ML: 2.5 INJECTION, SOLUTION INFILTRATION; PERINEURAL at 14:47

## 2020-08-13 RX ADMIN — BETAMETHASONE SODIUM PHOSPHATE AND BETAMETHASONE ACETATE 6 MG: 3; 3 INJECTION, SUSPENSION INTRA-ARTICULAR; INTRALESIONAL; INTRAMUSCULAR; SOFT TISSUE at 14:47

## 2020-08-13 RX ADMIN — LIDOCAINE HYDROCHLORIDE 2 ML: 10 INJECTION, SOLUTION INFILTRATION; PERINEURAL at 14:47

## 2020-08-13 NOTE — PROGRESS NOTES
Assessment:   Diagnosis ICD-10-CM Associated Orders   1  Pain in right hip  M25 551 XR hip/pelv 2-3 vws right if performed   2  History of right knee joint replacement  [id number]        Plan:  A possible future right total knee revision in the form of a poly exchange was again discussed with Natalia Gonsalez  At this point she does not feel her symptoms or quality of life warrant surgical intervention for her right knee discomfort  She understands this is an option in the future if and when her discomfort level begin severely disrupting her daily life  In regards to her right hip a corticosteroid injection was offered and accepted her into the right greater trochanter as outlined below  Knee components on Operative Report dated 5/19/2014  60 Vangaurd femur  71 tibial baseplate  98YB stabilized insert  No patella resurfacing    To do next visit:  No follow-ups on file  The above stated was discussed in layman's terms and the patient expressed understanding  All questions were answered to the patient's satisfaction  Subjective:   Antonio Narvaez is a 66 y o  female who presents today for recheck of her right leg and left knee  Her left knee was seen here for a probable rupture of a previous Baker cyst   This has improved since being seen here at the end of June  In regards to her right leg she continues to feel generalized right knee pain  She is status post right total knee arthroplasty done in [place] in 2015  This operative note is available for review today in her chart  She notes also thigh pain and lateral right hip discomfort  In the past she has received cortisone injections for bursitis  Her most recent 1 was almost 1 year ago with relief of the lateral pain for a few months        Review of systems negative unless otherwise specified in HPI    Past Medical History:   Diagnosis Date    Breast [facility]) 2007    Bilateral mastectomy with chemotherapy    Hypertension     Hypertensive disorder 5/2/2019    Pancreatitis     PONV (postoperative nausea and vomiting)     Scoliosis     Skin cancer        Past Surgical History:   Procedure Laterality Date    BASAL CELL CARCINOMA EXCISION      nose    BREAST BIOPSY      BREAST IMPLANT Bilateral     BREAST SURGERY      double mastectomy    CARPAL TUNNEL RELEASE      CATARACT EXTRACTION      DILATION AND CURETTAGE OF UTERUS      GANGLION CYST EXCISION Left     wrist    HERNIA REPAIR      HIP SURGERY      HYSTERECTOMY      KNEE ARTHROSCOPY      KNEE SURGERY      MASTECTOMY Bilateral 2007    NASAL ENDOSCOPY W/ BALLON SINUPLASTY      x4     PLANTAR FASCIA SURGERY Bilateral 1990    NC NASAL/SINUS ENDOSCOPY,RMV TISS MAXILL SINUS Right 7/27/2020    Procedure: FUNCTIONAL ENDOSCOPIC SINUS SURGERY (FESS) IMAGED GUIDED, MAXILLARY ANTROSTOM Y  ;SEPTOPLASTY;  Surgeon: Lyla Nava MD;  Location: [facility];  Service: ENT   [address]      ROTATOR CUFF REPAIR Bilateral 2004 2005    TOTAL ABDOMINAL HYSTERECTOMY      w RSO    TRIGGER FINGER RELEASE      TUBAL LIGATION         Family History   Problem Relation Age of Onset    No Known Problems Mother     No Known Problems Father        Social History     Occupational History    Not on file   Tobacco Use    Smoking status: Former Smoker    Smokeless tobacco: Never Used    Tobacco comment: quit 1980   Substance and Sexual Activity    Alcohol use: Not Currently    Drug use: Never    Sexual activity: Not Currently         Current Outpatient Medications:     acetaminophen (TYLENOL) 500 mg tablet, Take 1 tablet (500 mg total) by mouth every 6 (six) hours as needed for mild pain, Disp: 30 tablet, Rfl: 0    benazepril (LOTENSIN) 40 MG tablet, Take 1 tablet (40 mg total) by mouth daily, Disp: 90 tablet, Rfl: 1    Calcium Carb-Cholecalciferol (CALCIUM 600 + D PO), Take 1 tablet by mouth daily, Disp: , Rfl:     Coenzyme Q10 (CO Q-10) 200 MG CAPS, Take 1 tablet by mouth daily, Disp: , Rfl:     escitalopram (LEXAPRO) 20 mg tablet, Take 1 tablet (20 mg total) by mouth daily, Disp: 30 tablet, Rfl: 3    fluorouracil (EFUDEX) 5 % cream, APPLY BID TO SKIN CANCER ON RIGHT CHEEK FOR 6 WEEKS, Disp: , Rfl:     hydrALAZINE (APRESOLINE) 25 mg tablet, Take 1 tablet (25 mg total) by mouth 3 (three) times a day, Disp: 90 tablet, Rfl: 3    latanoprost (XALATAN) 0 005 % ophthalmic solution, Administer 1 drop to both eyes daily, Disp: , Rfl:     MELATONIN PO, Take by mouth, Disp: , Rfl:     metoprolol succinate (TOPROL-XL) 50 mg 24 hr tablet, Take 1 tablet (50 mg total) by mouth daily (Patient taking differently: Take 50 mg by mouth daily at bedtime ), Disp: 90 tablet, Rfl: 1    Multiple Vitamins-Minerals (PRESERVISION/LUTEIN PO), PreserVision Lutein, Disp: , Rfl:     SIMBRINZA 1-0 2 % SUSP, INT 1 GTT IN OU BID   DISCONTINUE COMBIGAN, Disp: , Rfl:     Allergies   Allergen Reactions    Dorzolamide Hcl-Timolol Mal Other (See Comments)     Tongue burning    Doxazosin Shortness Of Breath    Gabapentin Other (See Comments)    Lorazepam Hallucinations    Amlodipine Itching     Excessive wt gain    Amoxicillin-Pot Clavulanate Diarrhea    Buprenorphine Hcl Hallucinations    Carbidopa      Severe muscle spasms    Celecoxib Diarrhea    Chlorthalidone Edema     Itchiness, facial swelling, rash    Clindamycin Diarrhea    Clonidine Other (See Comments)     Burning mouth/tongue    Denosumab     Doxycycline Diarrhea and Nausea Only    Fosamax [Alendronate]      Severe stomach pain    Hydrocodone Hallucinations    Hydrocodone-Acetaminophen Hallucinations    Ibandronic Acid      Acid reflux    Levodopa      svere muscle spasms    Milnacipran      Extreme cold feeling    Pregabalin      sedation    Proline      Arthralgia myalgia    Ropinirole Headache    Rosuvastatin Itching    Tramadol Nausea Only     Severe stomach pain    Zenpep [Pancrelipase (Lip-Prot-Amyl)] Diarrhea     svere    Cefdinir Diarrhea and Rash    Rotigotine Rash            Vitals:    08/13/20 1356   BP: (!) 178/77   Pulse: 80       Objective:                      Tenderness along the right lateral hip and greater troch  No hip irritability with passive gentle  Motion of the right hip  Posterior approach hip incision is healed  No knee effusion right side today  Extensor mechanism intact  Diagnostics, reviewed and taken today if performed as documented: The attending physician has personally reviewed the pertinent films in PACS and interpretation is as follows: Right hip XRays confirm a stable and well located hip prosthesis without evidence of loosening  Procedures, if performed today:    Large joint arthrocentesis  Date/Time: 8/13/2020 2:47 PM  Consent given by: patient  Supporting Documentation  Indications: pain   Procedure Details  Needle size: 22 G (spinal)  Ultrasound guidance: no  Approach: lateral  Medications administered: 2 mL lidocaine 1 %; 6 mg betamethasone acetate-betamethasone sodium phosphate 6 (3-3) mg/mL; 2 mL bupivacaine 0 25 %    Patient tolerance: patient tolerated the procedure well with no immediate complications            Portions of the record may have been created with voice recognition software  Occasional wrong word or "sound a like" substitutions may have occurred due to the inherent limitations of voice recognition software  Read the chart carefully and recognize, using context, where substitutions have occurred

## 2020-08-17 ENCOUNTER — TELEPHONE (OUTPATIENT)
Dept: HEMATOLOGY ONCOLOGY | Facility: CLINIC | Age: 75
End: 2020-08-17

## 2020-08-17 NOTE — TELEPHONE ENCOUNTER
Patient is not currently experiencing any symptoms of fever, cough, shortness of breath, chills, repeated shaking with chills, muscle pain, headache, sore throat, or new loss of taste/smell  Patient was tested for COVID-19 due to a procedure and tested NEGATIVE  Patient has not been in contact with someone confirmed to have COVID-19  Reviewed masking policy with patient  Reviewed visitor policy with pt

## 2020-08-18 ENCOUNTER — OFFICE VISIT (OUTPATIENT)
Dept: INTERNAL MEDICINE CLINIC | Facility: CLINIC | Age: 75
End: 2020-08-18
Payer: MEDICARE

## 2020-08-18 VITALS
WEIGHT: 135.6 LBS | HEIGHT: 58 IN | SYSTOLIC BLOOD PRESSURE: 130 MMHG | BODY MASS INDEX: 28.46 KG/M2 | DIASTOLIC BLOOD PRESSURE: 88 MMHG | TEMPERATURE: 99 F | HEART RATE: 73 BPM | OXYGEN SATURATION: 97 %

## 2020-08-18 DIAGNOSIS — M81.0 AGE-RELATED OSTEOPOROSIS WITHOUT CURRENT PATHOLOGICAL FRACTURE: ICD-10-CM

## 2020-08-18 DIAGNOSIS — Z00.00 MEDICARE ANNUAL WELLNESS VISIT, SUBSEQUENT: ICD-10-CM

## 2020-08-18 DIAGNOSIS — I10 ESSENTIAL HYPERTENSION: Primary | ICD-10-CM

## 2020-08-18 DIAGNOSIS — Z13.6 SCREENING FOR CARDIOVASCULAR CONDITION: ICD-10-CM

## 2020-08-18 DIAGNOSIS — N18.30 CHRONIC RENAL INSUFFICIENCY, STAGE III (MODERATE) (HCC): ICD-10-CM

## 2020-08-18 DIAGNOSIS — F34.1 DYSTHYMIA: ICD-10-CM

## 2020-08-18 DIAGNOSIS — I10 ESSENTIAL HYPERTENSION: ICD-10-CM

## 2020-08-18 DIAGNOSIS — K21.9 GASTROESOPHAGEAL REFLUX DISEASE WITHOUT ESOPHAGITIS: ICD-10-CM

## 2020-08-18 DIAGNOSIS — E78.2 MIXED HYPERLIPIDEMIA: ICD-10-CM

## 2020-08-18 DIAGNOSIS — Z11.59 NEED FOR HEPATITIS C SCREENING TEST: ICD-10-CM

## 2020-08-18 DIAGNOSIS — F41.9 ANXIETY: ICD-10-CM

## 2020-08-18 PROBLEM — K27.9 PUD (PEPTIC ULCER DISEASE): Status: RESOLVED | Noted: 2020-01-28 | Resolved: 2020-08-18

## 2020-08-18 PROBLEM — M71.22 POPLITEAL CYST, LEFT: Status: RESOLVED | Noted: 2020-06-30 | Resolved: 2020-08-18

## 2020-08-18 PROBLEM — Z01.818 PREOP EXAMINATION: Status: RESOLVED | Noted: 2020-07-20 | Resolved: 2020-08-18

## 2020-08-18 PROBLEM — H81.13 BENIGN PAROXYSMAL POSITIONAL VERTIGO DUE TO BILATERAL VESTIBULAR DISORDER: Status: RESOLVED | Noted: 2020-01-28 | Resolved: 2020-08-18

## 2020-08-18 PROBLEM — M79.651 RIGHT THIGH PAIN: Status: RESOLVED | Noted: 2020-08-13 | Resolved: 2020-08-18

## 2020-08-18 PROBLEM — J32.0 CHRONIC MAXILLARY SINUSITIS: Status: RESOLVED | Noted: 2020-07-20 | Resolved: 2020-08-18

## 2020-08-18 PROCEDURE — 1160F RVW MEDS BY RX/DR IN RCRD: CPT | Performed by: INTERNAL MEDICINE

## 2020-08-18 PROCEDURE — 99214 OFFICE O/P EST MOD 30 MIN: CPT | Performed by: INTERNAL MEDICINE

## 2020-08-18 PROCEDURE — 1125F AMNT PAIN NOTED PAIN PRSNT: CPT | Performed by: INTERNAL MEDICINE

## 2020-08-18 PROCEDURE — 3075F SYST BP GE 130 - 139MM HG: CPT | Performed by: INTERNAL MEDICINE

## 2020-08-18 PROCEDURE — 3079F DIAST BP 80-89 MM HG: CPT | Performed by: INTERNAL MEDICINE

## 2020-08-18 PROCEDURE — 1036F TOBACCO NON-USER: CPT | Performed by: INTERNAL MEDICINE

## 2020-08-18 PROCEDURE — G0439 PPPS, SUBSEQ VISIT: HCPCS | Performed by: INTERNAL MEDICINE

## 2020-08-18 PROCEDURE — 3008F BODY MASS INDEX DOCD: CPT | Performed by: INTERNAL MEDICINE

## 2020-08-18 PROCEDURE — 1170F FXNL STATUS ASSESSED: CPT | Performed by: INTERNAL MEDICINE

## 2020-08-18 RX ORDER — SPIRONOLACTONE 25 MG/1
12.5 TABLET ORAL DAILY
Qty: 30 TABLET | Refills: 0 | Status: SHIPPED | OUTPATIENT
Start: 2020-08-18 | End: 2020-09-01

## 2020-08-18 RX ORDER — METOPROLOL SUCCINATE 50 MG/1
50 TABLET, EXTENDED RELEASE ORAL
Qty: 90 TABLET | Refills: 1 | Status: SHIPPED | OUTPATIENT
Start: 2020-08-18 | End: 2021-01-12 | Stop reason: SDUPTHER

## 2020-08-18 NOTE — ASSESSMENT & PLAN NOTE
Discontinue hydralazine as she started to develop symptoms of diarrhea, insomnia, and tremors with the increase in hydralazine dose from 10 mg 3 times a day to 25 mg 3 times a day  Will prescribe spironolactone 12 5 mg daily  Continue benazepril 40 mg daily and metoprolol succinate 50 mg daily  Will check a BMP in 1 week to evaluate for hyperkalemia

## 2020-08-18 NOTE — PROGRESS NOTES
Assessment and Plan:     Problem List Items Addressed This Visit     None      Visit Diagnoses     Medicare annual wellness visit, subsequent        Screening for cardiovascular condition        Relevant Orders    Lipid panel    Need for hepatitis C screening test        Relevant Orders    Hepatitis C antibody           Preventive health issues were discussed with patient, and age appropriate screening tests were ordered as noted in patient's After Visit Summary  Personalized health advice and appropriate referrals for health education or preventive services given if needed, as noted in patient's After Visit Summary       History of Present Illness:     Patient presents for Medicare Annual Wellness visit    Patient Care Team:  Ha Sagastume MD as PCP - General (Internal Medicine)  Jo Ann Eric MD (Ophthalmology)  Ermias South MD (Orthopedic Surgery)  Jc Nichols MD (Otolaryngology)  Lauri Vaughan MD (Orthopedic Surgery)  Aric Villegas MD (Gastroenterology)  Sharon James MD (Gastroenterology)  Drury Jeans, DO (Obstetrics and Gynecology)  aKren Drake GC (Genetics)     Problem List:     Patient Active Problem List   Diagnosis    Fibromyalgia    Glaucoma    Hyperlipidemia    Essential hypertension    Restless legs    Rheumatoid arthritis ([address] )    Gastroesophageal reflux disease without esophagitis    PUD (peptic ulcer disease)    Benign paroxysmal positional vertigo due to bilateral vestibular disorder    Pancreatic lesion    Anxiety    Dysthymia    Osteoarthritis of knee    Osteoarthritis of hip    Osteoporosis    Lumbosacral spondylosis without myelopathy    Macular degeneration    Chronic renal insufficiency, stage III (moderate) ([address] )    Stenosis of right carotid artery    Family history of pancreatic cancer    Popliteal cyst, left    History of right knee joint replacement    Chronic instability of right knee    Preop examination    Scoliosis, or kyphoscoliosis, idiopathic    Chronic maxillary sinusitis    Greater trochanteric bursitis of right hip    History of hip replacement, total, right    Right thigh pain      Past Medical and Surgical History:     Past Medical History:   Diagnosis Date    Breast cancer ([address] ) 2007    Bilateral mastectomy with chemotherapy    Hypertension     Hypertensive disorder 5/2/2019    Pancreatitis     PONV (postoperative nausea and vomiting)     Scoliosis     Skin cancer      Past Surgical History:   Procedure Laterality Date    BASAL CELL CARCINOMA EXCISION      nose    BREAST BIOPSY      BREAST IMPLANT Bilateral     BREAST SURGERY      double mastectomy    CARPAL TUNNEL RELEASE      CATARACT EXTRACTION      DILATION AND CURETTAGE OF UTERUS      GANGLION CYST EXCISION Left     wrist    HERNIA REPAIR      HIP SURGERY      HYSTERECTOMY      KNEE ARTHROSCOPY      KNEE SURGERY      MASTECTOMY Bilateral 2007    NASAL ENDOSCOPY W/ BALLON SINUPLASTY      x4     PLANTAR FASCIA SURGERY Bilateral 1990    IN NASAL/SINUS ENDOSCOPY,[facility] TISS MAXILL SINUS Right 7/27/2020    Procedure: FUNCTIONAL ENDOSCOPIC SINUS SURGERY (FESS) IMAGED GUIDED, MAXILLARY ANTROSTOM Y  ;SEPTOPLASTY;  Surgeon: Des Lou MD;  Location: [facility];  Service: ENT   [address] [place] [address] Bilateral 2004 2005    TOTAL ABDOMINAL HYSTERECTOMY      w RSO    TRIGGER FINGER RELEASE      TUBAL LIGATION        Family History:     Family History   Problem Relation Age of Onset    No Known Problems Mother     No Known Problems Father       Social History:     E-Cigarette/Vaping    E-Cigarette Use Never User      E-Cigarette/Vaping Substances    Nicotine No     THC No     CBD No     Flavoring No     Other No     Unknown No      Social History     Socioeconomic History    Marital status:       Spouse name: None    Number of children: None    Years of education: None    Highest education level: None   Occupational History    None   Social Needs    Financial resource strain: None    Food insecurity     Worry: None     Inability: None    Transportation needs     Medical: None     Non-medical: None   Tobacco Use    Smoking status: Former Smoker     Last attempt to quit: 1980     Years since quittin 6    Smokeless tobacco: Never Used   Substance and Sexual Activity    Alcohol use: Not Currently    Drug use: Never    Sexual activity: Not Currently   Lifestyle    Physical activity     Days per week: None     Minutes per session: None    Stress: None   Relationships    Social connections     Talks on phone: None     Gets together: None     Attends [employer] service: None     Active member of club or organization: None     Attends meetings of clubs or organizations: None     Relationship status: None    Intimate partner violence     Fear of current or ex partner: None     Emotionally abused: None     Physically abused: None     Forced sexual activity: None   Other Topics Concern    None   Social History Narrative    None      Medications and Allergies:     Current Outpatient Medications   Medication Sig Dispense Refill    acetaminophen (TYLENOL) 500 mg tablet Take 1 tablet (500 mg total) by mouth every 6 (six) hours as needed for mild pain 30 tablet 0    benazepril (LOTENSIN) 40 MG tablet Take 1 tablet (40 mg total) by mouth daily 90 tablet 1    Calcium Carb-Cholecalciferol (CALCIUM 600 + D PO) Take 1 tablet by mouth daily      Coenzyme Q10 (CO Q-10) 200 MG CAPS Take 1 tablet by mouth daily      escitalopram (LEXAPRO) 20 mg tablet Take 1 tablet (20 mg total) by mouth daily 30 tablet 3    hydrALAZINE (APRESOLINE) 25 mg tablet Take 1 tablet (25 mg total) by mouth 3 (three) times a day 90 tablet 3    latanoprost (XALATAN) 0 005 % ophthalmic solution Administer 1 drop to both eyes daily      metoprolol succinate (TOPROL-XL) 50 mg 24 hr tablet Take 1 tablet (50 mg total) by mouth daily (Patient taking differently: Take 50 mg by mouth daily at bedtime ) 90 tablet 1    Multiple Vitamins-Minerals (PRESERVISION/LUTEIN PO) PreserVision Lutein      SIMBRINZA 1-0 2 % SUSP INT 1 GTT IN OU BID  DISCONTINUE COMBIGAN      MELATONIN PO Take by mouth       No current facility-administered medications for this visit  Allergies   Allergen Reactions    Dorzolamide Hcl-Timolol Mal Other (See Comments)     Tongue burning    Doxazosin Shortness Of Breath    Gabapentin Other (See Comments)    Lorazepam Hallucinations    Amlodipine Itching     Excessive wt gain    Amoxicillin-Pot Clavulanate Diarrhea    Buprenorphine Hcl Hallucinations    Carbidopa      Severe muscle spasms    Celecoxib Diarrhea    Chlorthalidone Edema     Itchiness, facial swelling, rash    Clindamycin Diarrhea    Clonidine Other (See Comments)     Burning mouth/tongue    Denosumab     Doxycycline Diarrhea and Nausea Only    Fosamax [Alendronate]      Severe stomach pain    Hydrocodone Hallucinations    Hydrocodone-Acetaminophen Hallucinations    Ibandronic Acid      Acid reflux    Levodopa      svere muscle spasms    Milnacipran      Extreme cold feeling    Pregabalin      sedation    Proline      Arthralgia myalgia    Ropinirole Headache    Rosuvastatin Itching    Tramadol Nausea Only     Severe stomach pain    Zenpep [Pancrelipase (Lip-Prot-Amyl)] Diarrhea     svere    Cefdinir Diarrhea and Rash    Rotigotine Rash      Immunizations:     Immunization History   Administered Date(s) Administered    Influenza Split High Dose Preservative Free IM 10/07/2019    Tdap 09/06/2019      Health Maintenance: There are no preventive care reminders to display for this patient        Topic Date Due    Pneumococcal Vaccine: 65+ Years (1 of 1 - PPSV23) 10/22/2008    Influenza Vaccine  07/01/2020      Medicare Health Risk Assessment:     /88 (BP Location: Left arm, Patient Position: Sitting, Cuff Size: Adult)   Pulse 73   Temp 99 °F (37 2 °C) (Temporal)   Ht 4' 10" (1 473 m)   Wt 61 5 kg (135 lb 9 6 oz)   SpO2 97% Comment: ra  BMI 28 34 kg/m²      Ren Ortega is here for her Subsequent Wellness visit  Last Medicare Wellness visit information reviewed, patient interviewed and updates made to the record today  Health Risk Assessment:   Patient rates overall health as fair  Patient feels that their physical health rating is slightly worse  Eyesight was rated as much worse  Hearing was rated as same  Patient feels that their emotional and mental health rating is much worse  Pain experienced in the last 7 days has been none  Patient states that she has experienced no weight loss or gain in last 6 months  Depression Screening:   PHQ-2 Score: 0  PHQ-9 Score: 10      Fall Risk Screening: In the past year, patient has experienced: no history of falling in past year      Urinary Incontinence Screening:   Patient has not leaked urine accidently in the last six months  Home Safety:  Patient has trouble with stairs inside or outside of their home  Patient has working smoke alarms and has no working carbon monoxide detector  Home safety hazards include: none  Nutrition:   Current diet is Regular and Limited junk food  Medications:   Patient is currently taking over-the-counter supplements  OTC medications include: see medication list  Patient is able to manage medications  Activities of Daily Living (ADLs)/Instrumental Activities of Daily Living (IADLs):   Walk and transfer into and out of bed and chair?: Yes  Dress and groom yourself?: Yes    Bathe or shower yourself?: Yes    Feed yourself?  Yes  Do your laundry/housekeeping?: Yes  Manage your money, pay your bills and track your expenses?: Yes  Make your own meals?: Yes    Do your own shopping?: Yes    Previous Hospitalizations:   Any hospitalizations or ED visits within the last 12 months?: Yes      Hospitalization Comments: Surgery, endoscopy    Advance Care Planning:   Living will: Yes    Advanced directive: Yes    Advanced directive counseling given: Yes    End of Life Decisions reviewed with patient: Yes    Provider agrees with end of life decisions: Yes      Comments: Discussed advanced directives with patient: Full Code  Cognitive Screening:   Provider or family/friend/caregiver concerned regarding cognition?: No    PREVENTIVE SCREENINGS      Cardiovascular Screening:    General: Screening Not Indicated, History Lipid Disorder and Risks and Benefits Discussed      Diabetes Screening:     General: Screening Current and Risks and Benefits Discussed      Colorectal Cancer Screening:     General: Screening Not Indicated      Breast Cancer Screening:     General: Screening Not Indicated, History Breast Cancer, Risks and Benefits Discussed and Screening Current      Cervical Cancer Screening:    General: Screening Not Indicated and Risks and Benefits Discussed      Osteoporosis Screening:    General: Screening Not Indicated and History Osteoporosis      Abdominal Aortic Aneurysm (AAA) Screening:        General: Screening Not Indicated      Lung Cancer Screening:     General: Screening Not Indicated      Hepatitis C Screening:    General: Risks and Benefits Discussed    Hep C Screening Accepted: Yes      Other Counseling Topics:   Alcohol use counseling, car/seat belt/driving safety, skin self-exam, sunscreen and calcium and vitamin D intake and regular weightbearing exercise         Anna Rivera MD

## 2020-08-18 NOTE — ASSESSMENT & PLAN NOTE
For now, continue Lexapro 20 mg daily  If she continues to have tremors and insomnia, will discontinue Lexapro

## 2020-08-18 NOTE — PATIENT INSTRUCTIONS
Medicare Preventive Visit Patient Instructions  Thank you for completing your Welcome to Medicare Visit or Medicare Annual Wellness Visit today  Your next wellness visit will be due in one year (8/18/2021)  The screening/preventive services that you may require over the next 5-10 years are detailed below  Some tests may not apply to you based off risk factors and/or age  Screening tests ordered at today's visit but not completed yet may show as past due  Also, please note that scanned in results may not display below  Preventive Screenings:  Service Recommendations Previous Testing/Comments   Colorectal Cancer Screening  * Colonoscopy    * Fecal Occult Blood Test (FOBT)/Fecal Immunochemical Test (FIT)  * Fecal DNA/Cologuard Test  * Flexible Sigmoidoscopy Age: 54-65 years old   Colonoscopy: every 10 years (may be performed more frequently if at higher risk)  OR  FOBT/FIT: every 1 year  OR  Cologuard: every 3 years  OR  Sigmoidoscopy: every 5 years  Screening may be recommended earlier than age 48 if at higher risk for colorectal cancer  Also, an individualized decision between you and your healthcare provider will decide whether screening between the ages of 74-80 would be appropriate  Colonoscopy: Not on file  FOBT/FIT: Not on file  Cologuard: Not on file  Sigmoidoscopy: Not on file         Breast Cancer Screening Age: 36 years old  Frequency: every 1-2 years  Not required if history of left and right mastectomy Mammogram: Not on file    Screening Not Indicated  History Breast Cancer   Cervical Cancer Screening Between the ages of 21-29, pap smear recommended once every 3 years  Between the ages of 33-67, can perform pap smear with HPV co-testing every 5 years     Recommendations may differ for women with a history of total hysterectomy, cervical cancer, or abnormal pap smears in past  Pap Smear: 02/27/2020    Screening Not Indicated   Hepatitis C Screening Once for adults born between 1945 and 1965  More frequently in patients at high risk for Hepatitis C Hep C Antibody: Not on file       Diabetes Screening 1-2 times per year if you're at risk for diabetes or have pre-diabetes Fasting glucose: 86 mg/dL   A1C: No results in last 5 years    Screening Current   Cholesterol Screening Once every 5 years if you don't have a lipid disorder  May order more often based on risk factors  Lipid panel: Not on file    Screening Not Indicated  History Lipid Disorder     Other Preventive Screenings Covered by Medicare:  1  Abdominal Aortic Aneurysm (AAA) Screening: covered once if your at risk  You're considered to be at risk if you have a family history of AAA  2  Lung Cancer Screening: covers low dose CT scan once per year if you meet all of the following conditions: (1) Age 50-69; (2) No signs or symptoms of lung cancer; (3) Current smoker or have quit smoking within the last 15 years; (4) You have a tobacco smoking history of at least 30 pack years (packs per day multiplied by number of years you smoked); (5) You get a written order from a healthcare provider  3  Glaucoma Screening: covered annually if you're considered high risk: (1) You have diabetes OR (2) Family history of glaucoma OR (3)  aged 48 and older OR (3)  American aged 72 and older  3  Osteoporosis Screening: covered every 2 years if you meet one of the following conditions: (1) You're estrogen deficient and at risk for osteoporosis based off medical history and other findings; (2) Have a vertebral abnormality; (3) On glucocorticoid therapy for more than 3 months; (4) Have primary hyperparathyroidism; (5) On osteoporosis medications and need to assess response to drug therapy  · Last bone density test (DXA Scan): Not on file  5  HIV Screening: covered annually if you're between the age of 12-76  Also covered annually if you are younger than 13 and older than 72 with risk factors for HIV infection   For pregnant patients, it is covered up to 3 times per pregnancy  Immunizations:  Immunization Recommendations   Influenza Vaccine Annual influenza vaccination during flu season is recommended for all persons aged >= 6 months who do not have contraindications   Pneumococcal Vaccine (Prevnar and Pneumovax)  * Prevnar = PCV13  * Pneumovax = PPSV23   Adults 25-60 years old: 1-3 doses may be recommended based on certain risk factors  Adults 72 years old: Prevnar (PCV13) vaccine recommended followed by Pneumovax (PPSV23) vaccine  If already received PPSV23 since turning 65, then PCV13 recommended at least one year after PPSV23 dose  Hepatitis B Vaccine 3 dose series if at intermediate or high risk (ex: diabetes, end stage renal disease, liver disease)   Tetanus (Td) Vaccine - COST NOT COVERED BY MEDICARE PART B Following completion of primary series, a booster dose should be given every 10 years to maintain immunity against tetanus  Td may also be given as tetanus wound prophylaxis  Tdap Vaccine - COST NOT COVERED BY MEDICARE PART B Recommended at least once for all adults  For pregnant patients, recommended with each pregnancy  Shingles Vaccine (Shingrix) - COST NOT COVERED BY MEDICARE PART B  2 shot series recommended in those aged 48 and above     Health Maintenance Due:  There are no preventive care reminders to display for this patient  Immunizations Due:      Topic Date Due    Pneumococcal Vaccine: 65+ Years (1 of 1 - PPSV23) 10/22/2008    Influenza Vaccine  07/01/2020     Advance Directives   What are advance directives? Advance directives are legal documents that state your wishes and plans for medical care  These plans are made ahead of time in case you lose your ability to make decisions for yourself  Advance directives can apply to any medical decision, such as the treatments you want, and if you want to donate organs  What are the types of advance directives?   There are many types of advance directives, and each state has rules about how to use them  You may choose a combination of any of the following:  · Living will: This is a written record of the treatment you want  You can also choose which treatments you do not want, which to limit, and which to stop at a certain time  This includes surgery, medicine, IV fluid, and tube feedings  · Durable power of  for healthcare [place] [facility]): This is a written record that states who you want to make healthcare choices for you when you are unable to make them for yourself  This person, called a proxy, is usually a family member or a friend  You may choose more than 1 proxy  · Do not resuscitate (DNR) order:  A DNR order is used in case your heart stops beating or you stop breathing  It is a request not to have certain forms of treatment, such as CPR  A DNR order may be included in other types of advance directives  · Medical directive: This covers the care that you want if you are in a coma, near death, or unable to make decisions for yourself  You can list the treatments you want for each condition  Treatment may include pain medicine, surgery, blood transfusions, dialysis, IV or tube feedings, and a ventilator (breathing machine)  · Values history: This document has questions about your views, beliefs, and how you feel and think about life  This information can help others choose the care that you would choose  Why are advance directives important? An advance directive helps you control your care  Although spoken wishes may be used, it is better to have your wishes written down  Spoken wishes can be misunderstood, or not followed  Treatments may be given even if you do not want them  An advance directive may make it easier for your family to make difficult choices about your care     Weight Management   Why it is important to manage your weight:  Being overweight increases your risk of health conditions such as heart disease, high blood pressure, type 2 diabetes, and certain types of cancer  It can also increase your risk for osteoarthritis, sleep apnea, and other respiratory problems  Aim for a slow, steady weight loss  Even a small amount of weight loss can lower your risk of health problems  How to lose weight safely:  A safe and healthy way to lose weight is to eat fewer calories and get regular exercise  You can lose up about 1 pound a week by decreasing the number of calories you eat by 500 calories each day  Healthy meal plan for weight management:  A healthy meal plan includes a variety of foods, contains fewer calories, and helps you stay healthy  A healthy meal plan includes the following:  · Eat whole-grain foods more often  A healthy meal plan should contain fiber  Fiber is the part of grains, fruits, and vegetables that is not broken down by your body  Whole-grain foods are healthy and provide extra fiber in your diet  Some examples of whole-grain foods are whole-wheat breads and pastas, oatmeal, brown rice, and bulgur  · Eat a variety of vegetables every day  Include dark, leafy greens such as spinach, kale, ethan greens, and mustard greens  Eat yellow and orange vegetables such as carrots, sweet potatoes, and winter squash  · Eat a variety of fruits every day  Choose fresh or canned fruit (canned in its own juice or light syrup) instead of juice  Fruit juice has very little or no fiber  · Eat low-fat dairy foods  Drink fat-free (skim) milk or 1% milk  Eat fat-free yogurt and low-fat cottage cheese  Try low-fat cheeses such as mozzarella and other reduced-fat cheeses  · Choose meat and other protein foods that are low in fat  Choose beans or other legumes such as split peas or lentils  Choose fish, skinless poultry (chicken or turkey), or lean cuts of red meat (beef or pork)  Before you cook meat or poultry, cut off any visible fat  · Use less fat and oil  Try baking foods instead of frying them   Add less fat, such as margarine, sour cream, regular salad dressing and mayonnaise to foods  Eat fewer high-fat foods  Some examples of high-fat foods include french fries, doughnuts, ice cream, and cakes  · Eat fewer sweets  Limit foods and drinks that are high in sugar  This includes candy, cookies, regular soda, and sweetened drinks  Exercise:  Exercise at least 30 minutes per day on most days of the week  Some examples of exercise include walking, biking, dancing, and swimming  You can also fit in more physical activity by taking the stairs instead of the elevator or parking farther away from stores  Ask your healthcare provider about the best exercise plan for you  © Copyright [employer] 2018 Information is for End User's use only and may not be sold, redistributed or otherwise used for commercial purposes  All illustrations and images included in CareNotes® are the copyrighted property of [employer]  or [facility] Preventive Visit Patient Instructions  Thank you for completing your Welcome to Medicare Visit or Medicare Annual Wellness Visit today  Your next wellness visit will be due in one year (8/18/2021)  The screening/preventive services that you may require over the next 5-10 years are detailed below  Some tests may not apply to you based off risk factors and/or age  Screening tests ordered at today's visit but not completed yet may show as past due  Also, please note that scanned in results may not display below  Preventive Screenings:  Service Recommendations Previous Testing/Comments   Colorectal Cancer Screening  * Colonoscopy    * Fecal Occult Blood Test (FOBT)/Fecal Immunochemical Test (FIT)  * Fecal DNA/Cologuard Test  * Flexible Sigmoidoscopy Age: 54-65 years old   Colonoscopy: every 10 years (may be performed more frequently if at higher risk)  OR  FOBT/FIT: every 1 year  OR  Cologuard: every 3 years  OR  Sigmoidoscopy: every 5 years  Screening may be recommended earlier than age 48 if at higher risk for colorectal cancer  Also, an individualized decision between you and your healthcare provider will decide whether screening between the ages of 74-80 would be appropriate  Colonoscopy: Not on file  FOBT/FIT: Not on file  Cologuard: Not on file  Sigmoidoscopy: Not on file         Breast Cancer Screening Age: 36 years old  Frequency: every 1-2 years  Not required if history of left and right mastectomy Mammogram: Not on file    Screening Not Indicated  History Breast Cancer   Cervical Cancer Screening Between the ages of 21-29, pap smear recommended once every 3 years  Between the ages of 33-67, can perform pap smear with HPV co-testing every 5 years  Recommendations may differ for women with a history of total hysterectomy, cervical cancer, or abnormal pap smears in past  Pap Smear: 02/27/2020    Screening Not Indicated   Hepatitis C Screening Once for adults born between 1945 and 1965  More frequently in patients at high risk for Hepatitis C Hep C Antibody: Not on file       Diabetes Screening 1-2 times per year if you're at risk for diabetes or have pre-diabetes Fasting glucose: 86 mg/dL   A1C: No results in last 5 years    Screening Current   Cholesterol Screening Once every 5 years if you don't have a lipid disorder  May order more often based on risk factors  Lipid panel: Not on file    Screening Not Indicated  History Lipid Disorder     Other Preventive Screenings Covered by Medicare:  6  Abdominal Aortic Aneurysm (AAA) Screening: covered once if your at risk  You're considered to be at risk if you have a family history of AAA    7  Lung Cancer Screening: covers low dose CT scan once per year if you meet all of the following conditions: (1) Age 50-69; (2) No signs or symptoms of lung cancer; (3) Current smoker or have quit smoking within the last 15 years; (4) You have a tobacco smoking history of at least 30 pack years (packs per day multiplied by number of years you smoked); (5) You get a written order from a healthcare provider  8  Glaucoma Screening: covered annually if you're considered high risk: (1) You have diabetes OR (2) Family history of glaucoma OR (3)  aged 48 and older OR (3)  American aged 72 and older  5  Osteoporosis Screening: covered every 2 years if you meet one of the following conditions: (1) You're estrogen deficient and at risk for osteoporosis based off medical history and other findings; (2) Have a vertebral abnormality; (3) On glucocorticoid therapy for more than 3 months; (4) Have primary hyperparathyroidism; (5) On osteoporosis medications and need to assess response to drug therapy  · Last bone density test (DXA Scan): Not on file  10  HIV Screening: covered annually if you're between the age of 12-76  Also covered annually if you are younger than 13 and older than 72 with risk factors for HIV infection  For pregnant patients, it is covered up to 3 times per pregnancy  Immunizations:  Immunization Recommendations   Influenza Vaccine Annual influenza vaccination during flu season is recommended for all persons aged >= 6 months who do not have contraindications   Pneumococcal Vaccine (Prevnar and Pneumovax)  * Prevnar = PCV13  * Pneumovax = PPSV23   Adults 25-60 years old: 1-3 doses may be recommended based on certain risk factors  Adults 72 years old: Prevnar (PCV13) vaccine recommended followed by Pneumovax (PPSV23) vaccine  If already received PPSV23 since turning 65, then PCV13 recommended at least one year after PPSV23 dose  Hepatitis B Vaccine 3 dose series if at intermediate or high risk (ex: diabetes, end stage renal disease, liver disease)   Tetanus (Td) Vaccine - COST NOT COVERED BY MEDICARE PART B Following completion of primary series, a booster dose should be given every 10 years to maintain immunity against tetanus  Td may also be given as tetanus wound prophylaxis  Tdap Vaccine - COST NOT COVERED BY MEDICARE PART B Recommended at least once for all adults  For pregnant patients, recommended with each pregnancy  Shingles Vaccine (Shingrix) - COST NOT COVERED BY MEDICARE PART B  2 shot series recommended in those aged 48 and above     Health Maintenance Due:  There are no preventive care reminders to display for this patient  Immunizations Due:      Topic Date Due    Pneumococcal Vaccine: 65+ Years (1 of 1 - PPSV23) 10/22/2008    Influenza Vaccine  07/01/2020     Advance Directives   What are advance directives? Advance directives are legal documents that state your wishes and plans for medical care  These plans are made ahead of time in case you lose your ability to make decisions for yourself  Advance directives can apply to any medical decision, such as the treatments you want, and if you want to donate organs  What are the types of advance directives? There are many types of advance directives, and each state has rules about how to use them  You may choose a combination of any of the following:  · Living will: This is a written record of the treatment you want  You can also choose which treatments you do not want, which to limit, and which to stop at a certain time  This includes surgery, medicine, IV fluid, and tube feedings  · Durable power of  for healthcare [place] [facility]): This is a written record that states who you want to make healthcare choices for you when you are unable to make them for yourself  This person, called a proxy, is usually a family member or a friend  You may choose more than 1 proxy  · Do not resuscitate (DNR) order:  A DNR order is used in case your heart stops beating or you stop breathing  It is a request not to have certain forms of treatment, such as CPR  A DNR order may be included in other types of advance directives  · Medical directive: This covers the care that you want if you are in a coma, near death, or unable to make decisions for yourself  You can list the treatments you want for each condition   Treatment may include pain medicine, surgery, blood transfusions, dialysis, IV or tube feedings, and a ventilator (breathing machine)  · Values history: This document has questions about your views, beliefs, and how you feel and think about life  This information can help others choose the care that you would choose  Why are advance directives important? An advance directive helps you control your care  Although spoken wishes may be used, it is better to have your wishes written down  Spoken wishes can be misunderstood, or not followed  Treatments may be given even if you do not want them  An advance directive may make it easier for your family to make difficult choices about your care  Weight Management   Why it is important to manage your weight:  Being overweight increases your risk of health conditions such as heart disease, high blood pressure, type 2 diabetes, and certain types of cancer  It can also increase your risk for osteoarthritis, sleep apnea, and other respiratory problems  Aim for a slow, steady weight loss  Even a small amount of weight loss can lower your risk of health problems  How to lose weight safely:  A safe and healthy way to lose weight is to eat fewer calories and get regular exercise  You can lose up about 1 pound a week by decreasing the number of calories you eat by 500 calories each day  Healthy meal plan for weight management:  A healthy meal plan includes a variety of foods, contains fewer calories, and helps you stay healthy  A healthy meal plan includes the following:  · Eat whole-grain foods more often  A healthy meal plan should contain fiber  Fiber is the part of grains, fruits, and vegetables that is not broken down by your body  Whole-grain foods are healthy and provide extra fiber in your diet  Some examples of whole-grain foods are whole-wheat breads and pastas, oatmeal, brown rice, and bulgur  · Eat a variety of vegetables every day    Include dark, leafy greens such as spinach, kale, ethan greens, and mustard greens  Eat yellow and orange vegetables such as carrots, sweet potatoes, and winter squash  · Eat a variety of fruits every day  Choose fresh or canned fruit (canned in its own juice or light syrup) instead of juice  Fruit juice has very little or no fiber  · Eat low-fat dairy foods  Drink fat-free (skim) milk or 1% milk  Eat fat-free yogurt and low-fat cottage cheese  Try low-fat cheeses such as mozzarella and other reduced-fat cheeses  · Choose meat and other protein foods that are low in fat  Choose beans or other legumes such as split peas or lentils  Choose fish, skinless poultry (chicken or turkey), or lean cuts of red meat (beef or pork)  Before you cook meat or poultry, cut off any visible fat  · Use less fat and oil  Try baking foods instead of frying them  Add less fat, such as margarine, sour cream, regular salad dressing and mayonnaise to foods  Eat fewer high-fat foods  Some examples of high-fat foods include french fries, doughnuts, ice cream, and cakes  · Eat fewer sweets  Limit foods and drinks that are high in sugar  This includes candy, cookies, regular soda, and sweetened drinks  Exercise:  Exercise at least 30 minutes per day on most days of the week  Some examples of exercise include walking, biking, dancing, and swimming  You can also fit in more physical activity by taking the stairs instead of the elevator or parking farther away from stores  Ask your healthcare provider about the best exercise plan for you  © Copyright [employer] 2018 Information is for End User's use only and may not be sold, redistributed or otherwise used for commercial purposes   All illustrations and images included in CareNotes® are the copyrighted property of [employer]  or [address] [place] [address] [facility]

## 2020-08-18 NOTE — PROGRESS NOTES
Assessment/Plan:    Essential hypertension  Discontinue hydralazine as she started to develop symptoms of diarrhea, insomnia, and tremors with the increase in hydralazine dose from 10 mg 3 times a day to 25 mg 3 times a day  Will prescribe spironolactone 12 5 mg daily  Continue benazepril 40 mg daily and metoprolol succinate 50 mg daily  Will check a BMP in 1 week to evaluate for hyperkalemia  Osteoporosis  Continue calcium and vitamin-D supplementation  Due for DEXA scan in 1 year  Chronic renal insufficiency, stage III (moderate) (HCC)  Continue monitor kidney function  Hyperlipidemia  Will check lipid panel  Discussed dietary modifications  Anxiety  For now, continue Lexapro 20 mg daily  If she continues to have tremors and insomnia, will discontinue Lexapro  Diagnoses and all orders for this visit:    Essential hypertension  -     Basic metabolic panel; Future  -     spironolactone (ALDACTONE) 25 mg tablet; Take 0 5 tablets (12 5 mg total) by mouth daily    Medicare annual wellness visit, subsequent    Screening for cardiovascular condition  -     Lipid panel; Future    Need for hepatitis C screening test  -     Hepatitis C antibody; Future    Gastroesophageal reflux disease without esophagitis    Age-related osteoporosis without current pathological fracture    Chronic renal insufficiency, stage III (moderate) (HCC)    Mixed hyperlipidemia    Anxiety    Dysthymia                Time spent during encounter: 25 minutes (counseling, reviewing medications, and discussing treatment and plan)    Subjective:      Patient ID: Osman Rivas is a 66 y o  female  Chief Complaint   Patient presents with    Medicare Wellness Visit     sub     Follow-up     pt has "a lot of things to tell the doctor today" lbw 07/27/2020, refills may be needed based on med discussion       The following portions of the patient's history were reviewed and updated as appropriate: allergies, current medications, past family history, past medical history, past social history, past surgical history and problem list   64-year-old female is seen today for follow-up of chronic conditions  No laboratory studies to high today for  She has been experiencing insomnia, diarrhea, and tremors since 1 month  This occurred approximately shortly after her hydralazine dose was increased from 10 mg 3 times a day to 25 mg 3 times a day  She has also experienced weight gain which she feels may be due to metoprolol  She has intolerance/allergies to many antihypertensives and medications  Diarrhea    This is a new problem  The current episode started more than 1 month ago  The problem occurs 5 to 10 times per day  The problem has been unchanged  The stool consistency is described as watery  Pertinent negatives include no abdominal pain, chills, coughing, fever, headaches or vomiting  She has tried nothing for the symptoms  Anxiety   Presents for follow-up visit  Symptoms include insomnia and nervous/anxious behavior  Patient reports no chest pain, depressed mood, dizziness, nausea, palpitations, shortness of breath or suicidal ideas  Symptoms occur rarely  The severity of symptoms is mild  The patient sleeps 2 hours per night  The quality of sleep is non-restorative  Compliance with medications is %  Review of Systems   Constitutional: Negative for activity change, appetite change, chills, diaphoresis, fatigue and fever  HENT: Negative for congestion, postnasal drip, rhinorrhea, sinus pressure, sinus pain, sneezing and sore throat  Eyes: Negative for visual disturbance  Respiratory: Negative for apnea, cough, choking, chest tightness, shortness of breath and wheezing  Cardiovascular: Negative for chest pain, palpitations and leg swelling  Gastrointestinal: Positive for diarrhea  Negative for abdominal distention, abdominal pain, anal bleeding, blood in stool, constipation, nausea and vomiting  Endocrine: Negative for cold intolerance and heat intolerance  Genitourinary: Negative for difficulty urinating, dysuria and hematuria  Musculoskeletal: Negative  Skin: Negative  Neurological: Positive for tremors  Negative for dizziness, weakness, light-headedness, numbness and headaches  Hematological: Negative for adenopathy  Psychiatric/Behavioral: Negative for agitation, sleep disturbance and suicidal ideas  The patient is nervous/anxious and has insomnia  All other systems reviewed and are negative          Past Medical History:   Diagnosis Date    Benign paroxysmal positional vertigo due to bilateral vestibular disorder 1/28/2020    Breast cancer ([facility] [address] ) 2007    Bilateral mastectomy with chemotherapy    Chronic maxillary sinusitis 7/20/2020    Gastroesophageal reflux disease without esophagitis 1/28/2020    Hypertension     Hypertensive disorder 5/2/2019    Pancreatitis     PONV (postoperative nausea and vomiting)     Popliteal cyst, left 6/30/2020    PUD (peptic ulcer disease) 1/28/2020    Scoliosis     Skin cancer          Current Outpatient Medications:     acetaminophen (TYLENOL) 500 mg tablet, Take 1 tablet (500 mg total) by mouth every 6 (six) hours as needed for mild pain, Disp: 30 tablet, Rfl: 0    benazepril (LOTENSIN) 40 MG tablet, Take 1 tablet (40 mg total) by mouth daily, Disp: 90 tablet, Rfl: 1    Calcium Carb-Cholecalciferol (CALCIUM 600 + D PO), Take 1 tablet by mouth daily, Disp: , Rfl:     Coenzyme Q10 (CO Q-10) 200 MG CAPS, Take 1 tablet by mouth daily, Disp: , Rfl:     escitalopram (LEXAPRO) 20 mg tablet, Take 1 tablet (20 mg total) by mouth daily, Disp: 30 tablet, Rfl: 3    latanoprost (XALATAN) 0 005 % ophthalmic solution, Administer 1 drop to both eyes daily, Disp: , Rfl:     metoprolol succinate (TOPROL-XL) 50 mg 24 hr tablet, Take 1 tablet (50 mg total) by mouth daily (Patient taking differently: Take 50 mg by mouth daily at bedtime ), Disp: 90 tablet, Rfl: 1    Multiple Vitamins-Minerals (PRESERVISION/LUTEIN PO), PreserVision Lutein, Disp: , Rfl:     SIMBRINZA 1-0 2 % SUSP, INT 1 GTT IN OU BID   DISCONTINUE COMBIGAN, Disp: , Rfl:     MELATONIN PO, Take by mouth, Disp: , Rfl:     spironolactone (ALDACTONE) 25 mg tablet, Take 0 5 tablets (12 5 mg total) by mouth daily, Disp: 30 tablet, Rfl: 0    Allergies   Allergen Reactions    Dorzolamide Hcl-Timolol Mal Other (See Comments)     Tongue burning    Doxazosin Shortness Of Breath    Gabapentin Other (See Comments)    Lorazepam Hallucinations    Amlodipine Itching     Excessive wt gain    Amoxicillin-Pot Clavulanate Diarrhea    Buprenorphine Hcl Hallucinations    Carbidopa      Severe muscle spasms    Celecoxib Diarrhea    Chlorthalidone Edema     Itchiness, facial swelling, rash    Clindamycin Diarrhea    Clonidine Other (See Comments)     Burning mouth/tongue    Denosumab     Doxycycline Diarrhea and Nausea Only    Fosamax [Alendronate]      Severe stomach pain    Hydrocodone Hallucinations    Hydrocodone-Acetaminophen Hallucinations    Ibandronic Acid      Acid reflux    Levodopa      svere muscle spasms    Milnacipran      Extreme cold feeling    Pregabalin      sedation    Proline      Arthralgia myalgia    Ropinirole Headache    Rosuvastatin Itching    Tramadol Nausea Only     Severe stomach pain    Zenpep [Pancrelipase (Lip-Prot-Amyl)] Diarrhea     svere    Cefdinir Diarrhea and Rash    Rotigotine Rash       Social History   Past Surgical History:   Procedure Laterality Date    BASAL CELL CARCINOMA EXCISION      nose    BREAST BIOPSY      BREAST IMPLANT Bilateral     BREAST SURGERY      double mastectomy    CARPAL TUNNEL RELEASE      CATARACT EXTRACTION      DILATION AND CURETTAGE OF UTERUS      GANGLION CYST EXCISION Left     wrist    HERNIA REPAIR      HIP SURGERY      HYSTERECTOMY      KNEE ARTHROSCOPY      KNEE SURGERY      MASTECTOMY Bilateral 2007    NASAL ENDOSCOPY W/ BALLON SINUPLASTY      x4     PLANTAR FASCIA SURGERY Bilateral 1990    MT NASAL/SINUS ENDOSCOPY,RMV TISS MAXILL SINUS Right 7/27/2020    Procedure: FUNCTIONAL ENDOSCOPIC SINUS SURGERY (FESS) IMAGED GUIDED, MAXILLARY ANTROSTOM Y  ;SEPTOPLASTY;  Surgeon: Ruth Almaguer MD;  Location:  MAIN OR;  Service: ENT   [address] Bilateral 2004 2005    TOTAL ABDOMINAL HYSTERECTOMY      w RSO    TRIGGER FINGER RELEASE      TUBAL LIGATION       Family History   Problem Relation Age of Onset    No Known Problems Mother     No Known Problems Father        Objective:  /88 (BP Location: Left arm, Patient Position: Sitting, Cuff Size: Adult)   Pulse 73   Temp 99 °F (37 2 °C) (Temporal)   Ht 4' 10" (1 473 m)   Wt 61 5 kg (135 lb 9 6 oz)   SpO2 97% Comment: ra  BMI 28 34 kg/m²     Recent Results (from the past 1344 hour(s))   Novel Coronavirus (COVID-19), PCR [facility]    Collection Time: 07/01/20 11:10 AM    Specimen: Nose; Nares   Result Value Ref Range    SARS-CoV-2  Not Detected Not Detected   Tissue Exam    Collection Time: 07/06/20  2:15 PM   Result Value Ref Range    Case Report       Surgical Pathology Report                         Case: [id number]                                   Authorizing Provider:  Julissa Villalobos MD  Collected:           07/06/2020 1415              Ordering Location:     [address] [place] [address]      Received:            07/06/2020 [facility]                                                           Pathologist:           Ronda Santana MD                                                                 Specimens:   A) - Stomach, antral erosion                                                                        B) - Stomach, antrum                                                                       Final Diagnosis       A    Stomach, antral erosion, biopsy:  - Gastric antral mucosa with chronic inactive gastritis and features of reactive gastritis/gastropathy   - Negative for intestinal metaplasia, dysplasia or carcinoma  - No Helicobacter pylori is identified on H&E stained slide  Comment: Immunohistochemistry for pan cytokeratin AE1/AE3 and special stain for alcian blue/PAS highlight benign epithelial cells  B   Stomach, antrum, biopsy:  - Gastric antral and oxyntic mucosa with chronic, inactive gastritis  - Negative for intestinal metaplasia, dysplasia or carcinoma  - No Helicobacter pylori is identified on H&E stained slides  Comment: Immunohistochemistry for pan cytokeratin AE1/AE3 highlights benign epithelial cells  Additional Information       Interpretation performed at , [address] [place] [address]    All reported additional testing was performed with appropriately reactive controls  These tests were developed and their performance characteristics determined by [address] [place] [address] [facility] or appropriate performing facility, though some tests may be performed on tissues which have not been validated for performance characteristics (such as staining performed on alcohol exposed cell blocks and decalcified tissues)  Results should be interpreted with caution and in the context of the patients clinical condition  These tests may not be cleared or approved by the [place]  Food and Drug Administration, though the FDA has determined that such clearance or approval is not necessary  These tests are used for clinical purposes and they should not be regarded as investigational or for research  This laboratory has been approved by CLIA 88, designated as a high-complexity laboratory and is qualified to perform these tests  Trevor Pettit Description        A  The specimen is received in formalin, labeled with the patient's name and hospital number, and is designated "antral erosion    The specimen consists of 3 fragments of tan soft tissue ranging in size from 0 1-0 4 cm in greatest dimension  Entirely submitted in a screen cassette  B  The specimen is received in formalin, labeled with the patient's name and hospital number, and is designated "stomach antrum  The specimen consists of 2 fragments of tan soft tissue measuring 0 3 and 0 2 cm in greatest dimension  Entirely submitted in a screen cassette  Note: The estimated total formalin fixation time based upon information provided by the submitting clinician and the standard processing schedule is under 72 hours  [facility]                                           Tissue Exam    Collection Time: 07/06/20  2:21 PM   Result Value Ref Range    Case Report       Surgical Pathology Report                         Case: [id number]                                   Authorizing Provider:  Hardy Francisco MD             Collected:           07/06/2020 1421              Ordering Location:     [address] [place] [address]      Received:            07/06/2020 [facility]                                                           Pathologist:           Monroe Davila MD                                                                Specimen:    Stomach, incisura                                                                          Final Diagnosis       A  Gastric incisura (biopsy):     - Gastric oxyntic mucosa with no significant pathologic abnormality      - No definitive morphologic evidence of Helicobacter on routine sections      - No intestinal metaplasia, dysplasia or neoplasia identified  Additional Information       All reported additional testing was performed with appropriately reactive controls    These tests were developed and their performance characteristics determined by [facility] or appropriate performing facility, though some tests may be performed on tissues which have not been validated for performance characteristics (such as staining performed on alcohol exposed cell blocks and decalcified tissues)  Results should be interpreted with caution and in the context of the patients clinical condition  These tests may not be cleared or approved by the [place]  Food and Drug Administration, though the FDA has determined that such clearance or approval is not necessary  These tests are used for clinical purposes and they should not be regarded as investigational or for research  This laboratory has been approved by [facility] 88, designated as a high-complexity laboratory and is qualified to perform these tests     Interpretation performed at [place] [address]  Gross Description        A  The specimen is received in formalin, labeled with the patient's name and hospital number, and is designated "stomach incisura  The specimen consists of multiple fragments of tan soft tissue ranging in size from 0 2-0 6 cm in greatest dimension  Entirely submitted in a screen cassette  Note: The estimated total formalin fixation time based upon information provided by the submitting clinician and the standard processing schedule is under 72 hours    RRavotti              Clinical Information Pancreatic insufficiency    Novel Coronavirus (COVID-19), PCR [facility]    Collection Time: 07/17/20 10:59 AM    Specimen: Nose; Nares   Result Value Ref Range    SARS-CoV-2  Not Detected Not Detected   INPATIENT (COVID-19 HIGH PRIORITY)    Collection Time: 07/17/20 10:59 AM    Specimen: Nose; Nares   Result Value Ref Range    Inpatient Comment    Basic metabolic panel    Collection Time: 07/20/20 11:37 AM   Result Value Ref Range    Sodium 136 136 - 145 mmol/L    Potassium 4 8 3 5 - 5 3 mmol/L    Chloride 106 100 - 108 mmol/L    CO2 26 21 - 32 mmol/L    ANION GAP 4 4 - 13 mmol/L    BUN 31 (H) 5 - 25 mg/dL    Creatinine 1 16 0 60 - 1 30 mg/dL    Glucose 97 65 - 140 mg/dL    Calcium 9 4 8 3 - 10 1 mg/dL    eGFR 46 ml/min/1 73sq m   CBC and differential    Collection Time: 07/20/20 11:37 AM   Result Value Ref Range    WBC 6 31 4 31 - 10 16 Thousand/uL    RBC 3 86 3 81 - 5 12 Million/uL    Hemoglobin 11 2 (L) 11 5 - 15 4 g/dL    Hematocrit 36 5 34 8 - 46 1 %    MCV 95 82 - 98 fL    MCH 29 0 26 8 - 34 3 pg    MCHC 30 7 (L) 31 4 - 37 4 g/dL    RDW 14 3 11 6 - 15 1 %    MPV 10 4 8 9 - 12 7 fL    Platelets 079 182 - 718 Thousands/uL    nRBC 0 /100 WBCs    Neutrophils Relative 69 43 - 75 %    Immat GRANS % 0 0 - 2 %    Lymphocytes Relative 15 14 - 44 %    Monocytes Relative 11 4 - 12 %    Eosinophils Relative 4 0 - 6 %    Basophils Relative 1 0 - 1 %    Neutrophils Absolute 4 42 1 85 - 7 62 Thousands/µL    Immature Grans Absolute 0 02 0 00 - 0 20 Thousand/uL    Lymphocytes Absolute 0 94 0 60 - 4 47 Thousands/µL    Monocytes Absolute 0 66 0 17 - 1 22 Thousand/µL    Eosinophils Absolute 0 22 0 00 - 0 61 Thousand/µL    Basophils Absolute 0 05 0 00 - 0 10 Thousands/µL   Tissue Exam    Collection Time: 07/27/20  5:47 PM   Result Value Ref Range    Case Report       Surgical Pathology Report                         Case: [id number]                                   Authorizing Provider:  Mica Suárez MD      Collected:           07/27/2020 1747              Ordering Location:     [address] [place] [address]      Received:            07/27/2020 [address] [place], [place] [address] [address] Operating Room                                                      Pathologist:           Leora Scott MD                                                                  Specimen:    Maxillary Sinus, sinus contents                                                            Final Diagnosis       A  Sinus contents, Maxillary Sinus, Excision:  - Sinonasal mucosa and scant bone with chronic inflammation and reactive change, consistent with chronic sinusitis  - Negative for dysplasia or carcinoma        Additional Information       All reported additional testing was performed with appropriately reactive controls  These tests were developed and their performance characteristics determined by [facility] or appropriate performing facility, though some tests may be performed on tissues which have not been validated for performance characteristics (such as staining performed on alcohol exposed cell blocks and decalcified tissues)  Results should be interpreted with caution and in the context of the patients clinical condition  These tests may not be cleared or approved by the [place] S  Food and Drug Administration, though the FDA has determined that such clearance or approval is not necessary  These tests are used for clinical purposes and they should not be regarded as investigational or for research  This laboratory has been approved by [facility] 88, designated as a high-complexity laboratory and is qualified to perform these tests  Interpretation performed at [employer], [address] [place] [address]      Gross Description       A  The specimen is received in formalin, labeled with the patient's name and hospital number, and is designated "maxillary sinus  The specimen consists multiple red to brown fibromembranous, hemorrhagic friable irregularly-shaped soft tissue fragments measuring in aggregate of 6 0 x 5 5 x 0 4 cm  The specimen is drained into embedding bags  Representative sections  One cassette  Note: The estimated total formalin fixation time based upon information provided by the submitting clinician and the standard processing schedule is under 72 hours  Fanny              Physical Exam  Vitals signs and nursing note reviewed  Constitutional:       General: She is not in acute distress  Appearance: She is well-developed  She is not diaphoretic  HENT:      Head: Normocephalic and atraumatic  Eyes:      General:         Right eye: No discharge  Left eye: No discharge  Conjunctiva/sclera: Conjunctivae normal       Pupils: Pupils are equal, round, and reactive to light  Neck:      Musculoskeletal: Normal range of motion and neck supple  Thyroid: No thyromegaly  Vascular: No JVD  Cardiovascular:      Rate and Rhythm: Normal rate and regular rhythm  Heart sounds: Normal heart sounds  No murmur  No friction rub  No gallop  Pulmonary:      Effort: Pulmonary effort is normal  No respiratory distress  Breath sounds: Normal breath sounds  No wheezing or rales  Chest:      Chest wall: No tenderness  Abdominal:      General: There is no distension  Palpations: Abdomen is soft  Tenderness: There is no abdominal tenderness  Musculoskeletal: Normal range of motion  General: No tenderness or deformity  Lymphadenopathy:      Cervical: No cervical adenopathy  Skin:     General: Skin is warm and dry  Coloration: Skin is not pale  Findings: No erythema or rash  Neurological:      Mental Status: She is alert and oriented to person, place, and time  Cranial Nerves: No cranial nerve deficit  Motor: Tremor present  Coordination: Coordination normal    Psychiatric:         Behavior: Behavior normal          Thought Content:  Thought content normal          Judgment: Judgment normal

## 2020-08-19 ENCOUNTER — CONSULT (OUTPATIENT)
Dept: GYNECOLOGIC ONCOLOGY | Facility: CLINIC | Age: 75
End: 2020-08-19

## 2020-08-19 DIAGNOSIS — K86.9 PANCREATIC LESION: ICD-10-CM

## 2020-08-19 DIAGNOSIS — Z80.3 FAMILY HISTORY OF BREAST CANCER: ICD-10-CM

## 2020-08-19 DIAGNOSIS — Z80.0 FAMILY HISTORY OF PANCREATIC CANCER: Primary | ICD-10-CM

## 2020-08-19 DIAGNOSIS — Z85.3 PERSONAL HISTORY OF BREAST CANCER: ICD-10-CM

## 2020-08-19 PROCEDURE — NC001 PR NO CHARGE: Performed by: GENETIC COUNSELOR, MS

## 2020-08-19 NOTE — PROGRESS NOTES
Pre-Test Genetic Counseling Consult Note    Patient Name: Shelley Francois   /Age: 1945/ y o  Referring Provider: Cristal Mandujano MD     Date of Service: 2020  Genetic Counselor: Davion Alas MS, [facility]  Interpretation Services: None   [facility]      Christiano Putnam was referred to the [address] [place] [address] and Genetic Assessment Program due to her personal history of breast cancer and family history of pancreatic and breast cancer  She presents today to discuss the possibility of a hereditary cancer syndrome, options for genetic testing, and implications for her and her family  Cancer History and Treatment:   Personal History: Personal history of BCC x 3, breast cancer age 57 treated with double mastectomy and chemo and pancreatic mass/pancreatitis     Follow up with Dr Sarah Abel in 1 year     3/20/2020 MRI of Abdomen  1 0 x 1 4 cm multilocular cystic mass in the uncinate process of the pancreas  This is most likely a side branch IPMN or a serous cystic neoplasm  Recommend next followup in 2 years  Preferred modality is MRI of the abdomen with and without IV contrast/MRCP  First alternative is CT of the abdomen and pelvis, utilizing a pancreatic mass protocol  Second is abdomen MRI without contrast/MRCP     2020 Endoscopic Ultrasonography  IMPRESSION:  Prior fundoplication visualized  Antral gastritis with erosions, biopsies obtained  Signs of chronic pancreatitis  Small cyst in the uncinate process     RECOMMENDATION:  Follow up pathology  MRI in 12 months  Increase Creon to two tablets before meals  Increase fiber       Gyn Hx:  ; ovaries/uterus: hysterectomy and right oophorectomy due heavy bleeding     Screening Hx:   Breast MRI: Annual following bilateral mastectomy and reconstruction; most recent Aug/Sept 2019  Clinical breast exam: Aug/Sept 2019  Colonoscopy: Most recent 2019; no history of colon polyps   Skin cancer screening: Most recent 6/12/20 and every 6 months with dermatologist     Medical and Surgical History  Pertinent surgical history:   Past Surgical History:   Procedure Laterality Date    BASAL CELL CARCINOMA EXCISION      nose    BREAST BIOPSY      BREAST IMPLANT Bilateral     BREAST SURGERY      double mastectomy    CARPAL TUNNEL RELEASE      CATARACT EXTRACTION      DILATION AND CURETTAGE OF UTERUS      GANGLION CYST EXCISION Left     wrist    HERNIA REPAIR      HIP SURGERY      HYSTERECTOMY      KNEE ARTHROSCOPY      KNEE SURGERY      MASTECTOMY Bilateral 2007    NASAL ENDOSCOPY W/ BALLON SINUPLASTY      x4     PLANTAR FASCIA SURGERY Bilateral 1990    [place] NASAL/SINUS ENDOSCOPY,RMV TISS MAXILL SINUS Right 7/27/2020    Procedure: FUNCTIONAL ENDOSCOPIC SINUS SURGERY (FESS) IMAGED GUIDED, MAXILLARY ANTROSTOM Y  ;SEPTOPLASTY;  Surgeon: Juan Mack MD;  [facility];  Service: ENT   [address] [place] [address] ROTATOR CUFF REPAIR Bilateral 2004 2005    TOTAL ABDOMINAL HYSTERECTOMY      w RSO    TRIGGER FINGER RELEASE      TUBAL LIGATION        Pertinent medical history:  Past Medical History:   Diagnosis Date    Benign paroxysmal positional vertigo due to bilateral vestibular disorder 1/28/2020    Breast cancer ([address] ) 2007    Bilateral mastectomy with chemotherapy    Chronic maxillary sinusitis 7/20/2020    Gastroesophageal reflux disease without esophagitis 1/28/2020    Hypertension     Hypertensive disorder 5/2/2019    Pancreatitis     PONV (postoperative nausea and vomiting)     Popliteal cyst, left 6/30/2020    PUD (peptic ulcer disease) 1/28/2020    Scoliosis     Skin cancer        Other History:  Height:   Ht Readings from Last 1 Encounters:   08/18/20 4' 10" (1 473 m)     Weight:   Wt Readings from Last 1 Encounters:   08/18/20 61 5 kg (135 lb 9 6 oz)     Relevant Family History   Patient reports no Ashkenazi Nondenominational ancestry       Please refer to the scanned pedigree in the Media Tab for a complete family history     Sister  age 58 with pancreatic cancer  Sister age 76 bilateral breast cancer in her 51's  Nephew with testicular cancer in his 29's  Father  age 80 with pancreatic cancer  Paternal Cousin with lung cancer age 58  Paternal Cousin with brain cancer age 72  Paternal Cousin with prostate cancer age 70  Paternal Cousin with breast cancer age not known   Maternal Uncle with brain cancer     *All history is reported as provided by the patient; records are not available for review, except where indicated  Assessment:  We discussed sporadic, familial and hereditary cancer  We also discussed the many factors that influence our risk for cancer such as age, environmental exposures, lifestyle choices and family history  We reviewed the indications suggestive of a hereditary predisposition to cancer  Genetic testing is indicated for Urban Doshi based on the following criteria:   Meets NCCN V1 2020 Testing Criteria for High-Penetrance Breast and/or Ovarian Cancer Susceptibility Genes  Meets NCCN V1 2020 Testing Criteria for Pancreatic Cancer Susceptibility Genes    The risks, benefits, and limitations of genetic testing were reviewed with the patient, as well as genetic discrimination laws, and possible test results (positive, negative, variants of uncertain significance) and their clinical implications  If positive for a mutation, options for managing cancer risk include increased surveillance, use of chemopreventive medications, and preventive surgery were discussed  Urban Doshi was informed that if a hereditary cancer syndrome was identified in her, first degree relatives (parents, siblings, and children) have a chance of also inheriting the condition  Genetic testing would allow for predictive genetic testing in other relatives, who may also be at risk depending on their degree of relation       Plan: Patient decided to proceed with testing and provided consent  Summary:     Sample Collection:  The patient was given a blood kit and instructed to go to a [facility]'s lab    Genetic Testing Preformed: CancerNext (36 genes): APC, CATALINA, AXIN2 BARD1, BRCA1, BRCA2, BRIP1, BMPR1A, CDH1, CDK4, CDKN2A, CHEK2, DICER1, EPCAM, GREM1, HOXB13, MLH1, MSH2, MSH3, MSH6, MUTYH, NBN, Nf1, NTHL1, PALB2, PMS2, POLD1, POLE, PTEN, RAD51C, RAD51D, RECQL SMAD4, SMARCA4, STK11, TP53    Results take approximately 2-3 weeks to complete once test is started  We will contact Aliyah Valerio once results are available  Additional recommendations for surveillance/medical management will be made pending genetic test results

## 2020-08-25 ENCOUNTER — TRANSCRIBE ORDERS (OUTPATIENT)
Dept: ADMINISTRATIVE | Facility: HOSPITAL | Age: 75
End: 2020-08-25

## 2020-08-25 ENCOUNTER — APPOINTMENT (OUTPATIENT)
Dept: LAB | Facility: IMAGING CENTER | Age: 75
End: 2020-08-25
Payer: MEDICARE

## 2020-08-25 DIAGNOSIS — Z80.0 FAMILY HISTORY OF MALIGNANT NEOPLASM OF GASTROINTESTINAL TRACT: ICD-10-CM

## 2020-08-25 DIAGNOSIS — Z80.3 FAMILY HISTORY OF MALIGNANT NEOPLASM OF BREAST: ICD-10-CM

## 2020-08-25 DIAGNOSIS — Z11.59 NEED FOR HEPATITIS C SCREENING TEST: ICD-10-CM

## 2020-08-25 DIAGNOSIS — Z13.6 SCREENING FOR CARDIOVASCULAR CONDITION: ICD-10-CM

## 2020-08-25 DIAGNOSIS — Z85.3 PERSONAL HISTORY OF MALIGNANT NEOPLASM OF BREAST: ICD-10-CM

## 2020-08-25 DIAGNOSIS — I10 ESSENTIAL HYPERTENSION: ICD-10-CM

## 2020-08-25 DIAGNOSIS — K86.9 DISEASE OF PANCREAS: ICD-10-CM

## 2020-08-25 DIAGNOSIS — Z85.3 PERSONAL HISTORY OF MALIGNANT NEOPLASM OF BREAST: Primary | ICD-10-CM

## 2020-08-25 LAB
ANION GAP SERPL CALCULATED.3IONS-SCNC: 6 MMOL/L (ref 4–13)
BUN SERPL-MCNC: 18 MG/DL (ref 5–25)
CALCIUM SERPL-MCNC: 9.3 MG/DL (ref 8.3–10.1)
CHLORIDE SERPL-SCNC: 110 MMOL/L (ref 100–108)
CHOLEST SERPL-MCNC: 252 MG/DL (ref 50–200)
CO2 SERPL-SCNC: 25 MMOL/L (ref 21–32)
CREAT SERPL-MCNC: 1.09 MG/DL (ref 0.6–1.3)
GFR SERPL CREATININE-BSD FRML MDRD: 49 ML/MIN/1.73SQ M
GLUCOSE P FAST SERPL-MCNC: 93 MG/DL (ref 65–99)
HCV AB SER QL: NORMAL
HDLC SERPL-MCNC: 65 MG/DL
LDLC SERPL CALC-MCNC: 157 MG/DL (ref 0–100)
NONHDLC SERPL-MCNC: 187 MG/DL
POTASSIUM SERPL-SCNC: 4.2 MMOL/L (ref 3.5–5.3)
SODIUM SERPL-SCNC: 141 MMOL/L (ref 136–145)
TRIGL SERPL-MCNC: 152 MG/DL

## 2020-08-25 PROCEDURE — 80061 LIPID PANEL: CPT

## 2020-08-25 PROCEDURE — 36415 COLL VENOUS BLD VENIPUNCTURE: CPT

## 2020-08-25 PROCEDURE — 86803 HEPATITIS C AB TEST: CPT

## 2020-08-25 PROCEDURE — 80048 BASIC METABOLIC PNL TOTAL CA: CPT

## 2020-09-01 ENCOUNTER — OFFICE VISIT (OUTPATIENT)
Dept: INTERNAL MEDICINE CLINIC | Facility: CLINIC | Age: 75
End: 2020-09-01
Payer: MEDICARE

## 2020-09-01 VITALS
DIASTOLIC BLOOD PRESSURE: 92 MMHG | OXYGEN SATURATION: 98 % | HEART RATE: 68 BPM | SYSTOLIC BLOOD PRESSURE: 170 MMHG | WEIGHT: 132.8 LBS | BODY MASS INDEX: 27.88 KG/M2 | TEMPERATURE: 98.6 F | HEIGHT: 58 IN

## 2020-09-01 DIAGNOSIS — E78.2 MIXED HYPERLIPIDEMIA: ICD-10-CM

## 2020-09-01 DIAGNOSIS — F34.1 DYSTHYMIA: ICD-10-CM

## 2020-09-01 DIAGNOSIS — I10 ESSENTIAL HYPERTENSION: ICD-10-CM

## 2020-09-01 DIAGNOSIS — F41.9 ANXIETY: Primary | ICD-10-CM

## 2020-09-01 PROCEDURE — 99214 OFFICE O/P EST MOD 30 MIN: CPT | Performed by: INTERNAL MEDICINE

## 2020-09-01 RX ORDER — QUETIAPINE FUMARATE 25 MG/1
12.5 TABLET, FILM COATED ORAL
Qty: 15 TABLET | Refills: 3 | Status: SHIPPED | OUTPATIENT
Start: 2020-09-01 | End: 2021-01-04

## 2020-09-01 RX ORDER — PRAVASTATIN SODIUM 10 MG
5 TABLET ORAL
Qty: 30 TABLET | Refills: 5 | Status: SHIPPED | OUTPATIENT
Start: 2020-09-01 | End: 2021-01-04

## 2020-09-01 RX ORDER — SPIRONOLACTONE 25 MG/1
25 TABLET ORAL DAILY
Qty: 30 TABLET | Refills: 0 | Status: SHIPPED | OUTPATIENT
Start: 2020-09-01 | End: 2020-10-14 | Stop reason: SDUPTHER

## 2020-09-01 NOTE — ASSESSMENT & PLAN NOTE
Continue metoprolol succinate 50 mg daily and benazepril 40 mg daily  Will increase spironolactone from 12 5 mg daily to 25 mg daily  Will check BMP in 1 week  She will contact me in 2 weeks with home blood pressure readings  Follow-up in the office in 1 month

## 2020-09-01 NOTE — PROGRESS NOTES
Assessment/Plan:    Essential hypertension  Continue metoprolol succinate 50 mg daily and benazepril 40 mg daily  Will increase spironolactone from 12 5 mg daily to 25 mg daily  Will check BMP in 1 week  She will contact me in 2 weeks with home blood pressure readings  Follow-up in the office in 1 month  Anxiety  Discontinue Lexapro due to side effect (tremors close eyes  Will start Seroquel 12 5 mg daily  Advised that she wean off Lexapro by taking 20 mg every other day for 2 weeks then stop    Hyperlipidemia  Will start pravastatin 5 mg daily  Diagnoses and all orders for this visit:    Anxiety  -     QUEtiapine (SEROquel) 25 mg tablet; Take 0 5 tablets (12 5 mg total) by mouth daily at bedtime    Dysthymia  -     QUEtiapine (SEROquel) 25 mg tablet; Take 0 5 tablets (12 5 mg total) by mouth daily at bedtime    Essential hypertension  -     spironolactone (ALDACTONE) 25 mg tablet; Take 1 tablet (25 mg total) by mouth daily  -     Basic metabolic panel; Future    Mixed hyperlipidemia  -     pravastatin (PRAVACHOL) 10 mg tablet; Take 0 5 tablets (5 mg total) by mouth daily at bedtime                Subjective:      Patient ID: Julian Carrillo is a 66 y o  female  Chief Complaint   Patient presents with    Follow-up     Patient is here for f/u HTN and review blood work  Pt c/o shaky hands for 6 weeks and its getting worse, patient states that it might due to the Lexapro  66year old female is seen today for follow up of hypertension  Since last visit, hydralazine was discontinued due to concern for side effects  Once she discontinue hydralazine, she no longer was experiencing diarrhea and insomnia  Blood pressures remained uncontrolled  She is currently on spironolactone 12 5 mg daily, benazepril 40 mg daily, and metoprolol succinate 50 mg daily  Her blood pressure readings at home range anywhere from 144-170 mm Hg over 70s with a heart rate in the 60s    BMP following initiation of spironolactone 12 5 mg daily is within acceptable range, potassium of 4 2  Remaining laboratory studies reviewed today, lipid panel shows elevation in total cholesterol and LDL  Hepatitis-C antibodies negative  She continues to experience tremors which she feels may be due to Lexapro  She decreased her Lexapro due to every other day and noticed an improvement in her tremors  Hypertension   This is a chronic problem  The current episode started more than 1 year ago  The problem is unchanged  The problem is uncontrolled  Associated symptoms include anxiety  Pertinent negatives include no chest pain, headaches, palpitations or shortness of breath  Past treatments include diuretics, beta blockers and ACE inhibitors  The current treatment provides mild improvement  There are no compliance problems  Anxiety   Presents for follow-up visit  Symptoms include excessive worry and nervous/anxious behavior  Patient reports no chest pain, dizziness, nausea, palpitations, shortness of breath or suicidal ideas  The severity of symptoms is moderate and causing significant distress  The patient sleeps 6 hours per night  Compliance with medications is %  The following portions of the patient's history were reviewed and updated as appropriate: allergies, current medications, past family history, past medical history, past social history, past surgical history and problem list     Review of Systems   Constitutional: Negative for activity change, appetite change, chills, diaphoresis, fatigue and fever  HENT: Negative for congestion, postnasal drip, rhinorrhea, sinus pressure, sinus pain, sneezing and sore throat  Eyes: Negative for visual disturbance  Respiratory: Negative for apnea, cough, choking, chest tightness, shortness of breath and wheezing  Cardiovascular: Negative for chest pain, palpitations and leg swelling     Gastrointestinal: Negative for abdominal distention, abdominal pain, anal bleeding, blood in stool, constipation, diarrhea, nausea and vomiting  Endocrine: Negative for cold intolerance and heat intolerance  Genitourinary: Negative for difficulty urinating, dysuria and hematuria  Musculoskeletal: Negative  Skin: Negative  Neurological: Negative for dizziness, weakness, light-headedness, numbness and headaches  Hematological: Negative for adenopathy  Psychiatric/Behavioral: Negative for agitation, sleep disturbance and suicidal ideas  The patient is nervous/anxious  All other systems reviewed and are negative          Past Medical History:   Diagnosis Date    Benign paroxysmal positional vertigo due to bilateral vestibular disorder 1/28/2020    Breast cancer [address] [address] ) 2007    Bilateral mastectomy with chemotherapy    Chronic maxillary sinusitis 7/20/2020    Gastroesophageal reflux disease without esophagitis 1/28/2020    Hypertension     Hypertensive disorder 5/2/2019    Pancreatitis     PONV (postoperative nausea and vomiting)     Popliteal cyst, left 6/30/2020    PUD (peptic ulcer disease) 1/28/2020    Scoliosis     Skin cancer          Current Outpatient Medications:     acetaminophen (TYLENOL) 500 mg tablet, Take 1 tablet (500 mg total) by mouth every 6 (six) hours as needed for mild pain, Disp: 30 tablet, Rfl: 0    benazepril (LOTENSIN) 40 MG tablet, Take 1 tablet (40 mg total) by mouth daily, Disp: 90 tablet, Rfl: 1    Calcium Carb-Cholecalciferol (CALCIUM 600 + D PO), Take 1 tablet by mouth daily, Disp: , Rfl:     Coenzyme Q10 (CO Q-10) 200 MG CAPS, Take 1 tablet by mouth daily, Disp: , Rfl:     latanoprost (XALATAN) 0 005 % ophthalmic solution, Administer 1 drop to both eyes daily, Disp: , Rfl:     MELATONIN PO, Take by mouth, Disp: , Rfl:     metoprolol succinate (TOPROL-XL) 50 mg 24 hr tablet, Take 1 tablet (50 mg total) by mouth daily at bedtime, Disp: 90 tablet, Rfl: 1    Multiple Vitamins-Minerals (PRESERVISION/LUTEIN PO), PreserVision Lutein, Disp: , Rfl:     SIMBRINZA 1-0 2 % SUSP, INT 1 GTT IN OU BID   DISCONTINUE COMBIGAN, Disp: , Rfl:     spironolactone (ALDACTONE) 25 mg tablet, Take 1 tablet (25 mg total) by mouth daily, Disp: 30 tablet, Rfl: 0    pravastatin (PRAVACHOL) 10 mg tablet, Take 0 5 tablets (5 mg total) by mouth daily at bedtime, Disp: 30 tablet, Rfl: 5    QUEtiapine (SEROquel) 25 mg tablet, Take 0 5 tablets (12 5 mg total) by mouth daily at bedtime, Disp: 15 tablet, Rfl: 3    Allergies   Allergen Reactions    Dorzolamide Hcl-Timolol Mal Other (See Comments)     Tongue burning    Doxazosin Shortness Of Breath    Gabapentin Other (See Comments)    Lexapro [Escitalopram] Tremor    Lorazepam Hallucinations    Amlodipine Itching     Excessive wt gain    Amoxicillin-Pot Clavulanate Diarrhea    Buprenorphine Hcl Hallucinations    Carbidopa      Severe muscle spasms    Celecoxib Diarrhea    Chlorthalidone Edema     Itchiness, facial swelling, rash    Clindamycin Diarrhea    Clonidine Other (See Comments)     Burning mouth/tongue    Denosumab     Doxycycline Diarrhea and Nausea Only    Fosamax [Alendronate]      Severe stomach pain    Hydrocodone Hallucinations    Hydrocodone-Acetaminophen Hallucinations    Ibandronic Acid      Acid reflux    Levodopa      svere muscle spasms    Milnacipran      Extreme cold feeling    Pregabalin      sedation    Proline      Arthralgia myalgia    Ropinirole Headache    Rosuvastatin Itching    Tramadol Nausea Only     Severe stomach pain    Zenpep [Pancrelipase (Lip-Prot-Amyl)] Diarrhea     svere    Cefdinir Diarrhea and Rash    Rotigotine Rash       Social History   Past Surgical History:   Procedure Laterality Date    BASAL CELL CARCINOMA EXCISION      nose    BREAST BIOPSY      BREAST IMPLANT Bilateral     BREAST SURGERY      double mastectomy    CARPAL TUNNEL RELEASE      CATARACT EXTRACTION      DILATION AND CURETTAGE OF UTERUS      GANGLION CYST EXCISION Left wrist    HERNIA REPAIR      HIP SURGERY      HYSTERECTOMY      KNEE ARTHROSCOPY      KNEE SURGERY      MASTECTOMY Bilateral 2007    NASAL ENDOSCOPY W/ BALLON SINUPLASTY      x4     PLANTAR FASCIA SURGERY Bilateral 1990    [place] NASAL/SINUS ENDOSCOPY,RMV TISS MAXILL SINUS Right 7/27/2020    Procedure: FUNCTIONAL ENDOSCOPIC SINUS SURGERY (FESS) IMAGED GUIDED, MAXILLARY ANTROSTOM Y  ;SEPTOPLASTY;  Surgeon: Nikolay Helms MD;  [facility];  Service: ENT    [address] Bilateral 2004 2005    TOTAL ABDOMINAL HYSTERECTOMY      w RSO    TRIGGER FINGER RELEASE      TUBAL LIGATION       Family History   Problem Relation Age of Onset    No Known Problems Mother     No Known Problems Father        Objective:  /92 (BP Location: Left arm, Patient Position: Sitting, Cuff Size: Standard)   Pulse 68   Temp 98 6 °F (37 °C) (Temporal)   Ht 4' 10" (1 473 m)   Wt 60 2 kg (132 lb 12 8 oz)   SpO2 98%   BMI 27 76 kg/m²     Recent Results (from the past 1344 hour(s))   Novel Coronavirus (COVID-19), PCR [facility]    Collection Time: 07/17/20 10:59 AM    Specimen: Nose; Nares   Result Value Ref Range    SARS-CoV-2  Not Detected Not Detected   INPATIENT (COVID-19 HIGH PRIORITY)    Collection Time: 07/17/20 10:59 AM    Specimen: Nose; Nares   Result Value Ref Range    Inpatient Comment    Basic metabolic panel    Collection Time: 07/20/20 11:37 AM   Result Value Ref Range    Sodium 136 136 - 145 mmol/L    Potassium 4 8 3 5 - 5 3 mmol/L    Chloride 106 100 - 108 mmol/L    CO2 26 21 - 32 mmol/L    ANION GAP 4 4 - 13 mmol/L    BUN 31 (H) 5 - 25 mg/dL    Creatinine 1 16 0 60 - 1 30 mg/dL    Glucose 97 65 - 140 mg/dL    Calcium 9 4 8 3 - 10 1 mg/dL    eGFR 46 ml/min/1 73sq m   CBC and differential    Collection Time: 07/20/20 11:37 AM   Result Value Ref Range    WBC 6 31 4 31 - 10 16 Thousand/uL    RBC 3 86 3 81 - 5 12 Million/uL    Hemoglobin 11 2 (L) 11 5 - 15 4 g/dL    Hematocrit 36 5 34 8 - 46 1 %    MCV 95 82 - 98 fL    MCH 29 0 26 8 - 34 3 pg    MCHC 30 7 (L) 31 4 - 37 4 g/dL    RDW 14 3 11 6 - 15 1 %    MPV 10 4 8 9 - 12 7 fL    Platelets 677 134 - 681 Thousands/uL    nRBC 0 /100 WBCs    Neutrophils Relative 69 43 - 75 %    Immat GRANS % 0 0 - 2 %    Lymphocytes Relative 15 14 - 44 %    Monocytes Relative 11 4 - 12 %    Eosinophils Relative 4 0 - 6 %    Basophils Relative 1 0 - 1 %    Neutrophils Absolute 4 42 1 85 - 7 62 Thousands/µL    Immature Grans Absolute 0 02 0 00 - 0 20 Thousand/uL    Lymphocytes Absolute 0 94 0 60 - 4 47 Thousands/µL    Monocytes Absolute 0 66 0 17 - 1 22 Thousand/µL    Eosinophils Absolute 0 22 0 00 - 0 61 Thousand/µL    Basophils Absolute 0 05 0 00 - 0 10 Thousands/µL   Tissue Exam    Collection Time: 07/27/20  5:47 PM   Result Value Ref Range    Case Report       Surgical Pathology Report                         Case: [id number]                                   Authorizing Provider:  Des Lou MD      Collected:           07/27/2020 1747              Ordering Location:     [address] [place] [address]      Received:            07/27/2020 [address] [place] [address] Operating Room                                                      Pathologist:           Cristal Rashid MD                                                                  Specimen:    Maxillary Sinus, sinus contents                                                            Final Diagnosis       A  Sinus contents, Maxillary Sinus, Excision:  - Sinonasal mucosa and scant bone with chronic inflammation and reactive change, consistent with chronic sinusitis  - Negative for dysplasia or carcinoma  Additional Information       All reported additional testing was performed with appropriately reactive controls    These tests were developed and their performance characteristics determined by [facility] or appropriate performing facility, though some tests may be performed on tissues which have not been validated for performance characteristics (such as staining performed on alcohol exposed cell blocks and decalcified tissues)  Results should be interpreted with caution and in the context of the patients clinical condition  These tests may not be cleared or approved by the [place]  Food and Drug Administration, though the FDA has determined that such clearance or approval is not necessary  These tests are used for clinical purposes and they should not be regarded as investigational or for research  This laboratory has been approved by [facility], designated as a high-complexity laboratory and is qualified to perform these tests  Interpretation performed at [facility], [address] [place] [address]      Gross Description       A  The specimen is received in formalin, labeled with the patient's name and hospital number, and is designated "maxillary sinus  The specimen consists multiple red to brown fibromembranous, hemorrhagic friable irregularly-shaped soft tissue fragments measuring in aggregate of 6 0 x 5 5 x 0 4 cm  The specimen is drained into embedding bags  Representative sections  One cassette  Note: The estimated total formalin fixation time based upon information provided by the submitting clinician and the standard processing schedule is under 72 hours      Fanny     Lipid panel    Collection Time: 08/25/20 10:02 AM   Result Value Ref Range    Cholesterol 252 (H) 50 - 200 mg/dL    Triglycerides 152 (H) <=150 mg/dL    HDL, Direct 65 >=40 mg/dL    LDL Calculated 157 (H) 0 - 100 mg/dL    Non-HDL-Chol (CHOL-HDL) 187 mg/dl   Hepatitis C antibody    Collection Time: 08/25/20 10:02 AM   Result Value Ref Range    Hepatitis C Ab Non-reactive Non-reactive   Basic metabolic panel    Collection Time: 08/25/20 10:02 AM   Result Value Ref Range    Sodium 141 136 - 145 mmol/L    Potassium 4 2 3 5 - 5 3 mmol/L    Chloride 110 (H) 100 - 108 mmol/L    CO2 25 21 - 32 mmol/L    ANION GAP 6 4 - 13 mmol/L    BUN 18 5 - 25 mg/dL    Creatinine 1 09 0 60 - 1 30 mg/dL    Glucose, Fasting 93 65 - 99 mg/dL    Calcium 9 3 8 3 - 10 1 mg/dL    eGFR 49 ml/min/1 73sq m            Physical Exam  Vitals signs and nursing note reviewed  Constitutional:       General: She is not in acute distress  Appearance: She is well-developed  She is not diaphoretic  HENT:      Head: Normocephalic and atraumatic  Eyes:      General:         Right eye: No discharge  Left eye: No discharge  Conjunctiva/sclera: Conjunctivae normal       Pupils: Pupils are equal, round, and reactive to light  Neck:      Musculoskeletal: Normal range of motion and neck supple  Thyroid: No thyromegaly  Vascular: No JVD  Cardiovascular:      Rate and Rhythm: Normal rate and regular rhythm  Heart sounds: Normal heart sounds  No murmur  No friction rub  No gallop  Pulmonary:      Effort: Pulmonary effort is normal  No respiratory distress  Breath sounds: Normal breath sounds  No wheezing or rales  Chest:      Chest wall: No tenderness  Abdominal:      General: There is no distension  Palpations: Abdomen is soft  Tenderness: There is no abdominal tenderness  Musculoskeletal: Normal range of motion  General: No tenderness or deformity  Lymphadenopathy:      Cervical: No cervical adenopathy  Skin:     General: Skin is warm and dry  Coloration: Skin is not pale  Findings: No erythema or rash  Neurological:      Mental Status: She is alert and oriented to person, place, and time  Cranial Nerves: No cranial nerve deficit  Motor: Tremor present  Coordination: Coordination normal    Psychiatric:         Behavior: Behavior normal          Thought Content:  Thought content normal          Judgment: Judgment normal

## 2020-09-01 NOTE — ASSESSMENT & PLAN NOTE
Discontinue Lexapro due to side effect (tremors close eyes  Will start Seroquel 12 5 mg daily    Advised that she wean off Lexapro by taking 20 mg every other day for 2 weeks then stop

## 2020-09-08 ENCOUNTER — CLINICAL SUPPORT (OUTPATIENT)
Dept: INTERNAL MEDICINE CLINIC | Facility: CLINIC | Age: 75
End: 2020-09-08
Payer: MEDICARE

## 2020-09-08 DIAGNOSIS — I10 ESSENTIAL HYPERTENSION: Primary | ICD-10-CM

## 2020-09-08 LAB
ANION GAP SERPL CALCULATED.3IONS-SCNC: 3 MMOL/L (ref 4–13)
BUN SERPL-MCNC: 24 MG/DL (ref 5–25)
CALCIUM SERPL-MCNC: 9.6 MG/DL (ref 8.3–10.1)
CHLORIDE SERPL-SCNC: 109 MMOL/L (ref 100–108)
CO2 SERPL-SCNC: 29 MMOL/L (ref 21–32)
CREAT SERPL-MCNC: 1.06 MG/DL (ref 0.6–1.3)
GFR SERPL CREATININE-BSD FRML MDRD: 51 ML/MIN/1.73SQ M
GLUCOSE P FAST SERPL-MCNC: 86 MG/DL (ref 65–99)
POTASSIUM SERPL-SCNC: 5.2 MMOL/L (ref 3.5–5.3)
SODIUM SERPL-SCNC: 141 MMOL/L (ref 136–145)

## 2020-09-08 PROCEDURE — 80048 BASIC METABOLIC PNL TOTAL CA: CPT | Performed by: INTERNAL MEDICINE

## 2020-09-08 PROCEDURE — 36415 COLL VENOUS BLD VENIPUNCTURE: CPT

## 2020-09-18 ENCOUNTER — TELEPHONE (OUTPATIENT)
Dept: GYNECOLOGIC ONCOLOGY | Facility: CLINIC | Age: 75
End: 2020-09-18

## 2020-09-18 NOTE — TELEPHONE ENCOUNTER
Post-Test Genetic Counseling Consult Note  Today I spoke with Eveline Arguello over the phone to review the results of her genetic test for hereditary cancer  She met previously with Daphnie Curry on 8/19 for pre-test counseling  SUMMARY:    Test(s):     Result: Negative - No Clinically Significant Variants Detected      Assessment:   A negative result significantly reduces the likelihood that Eveline Arguello has a hereditary cancer syndrome  However, this testing is unable to completely rule out the presence of hereditary cancer  It remains possible that:  - There is a variant in an area of a gene which was not tested or there is a variant not detectable due to technical limitations of this test      - There is a variant in another gene that was not included in this test or in a gene not known to be linked to cancer or tumors  - A family member has a genetic variant that the patient did not inherit  - The cancer in the family is sporadic and is related to non-hereditary factors  We also discussed some non-genetic modifiable risk factors for cancer  We encouraged Eveline Arguello to practice good eating habits, maintain a healthy weight, exercise regularly, limit alcohol intake, and avoid tobacco products  Risks for Family Members:    Muna Walsh was made aware that all of her first-degree female relatives are at increased risk to develop breast cancer based on the family history of breast cancer  We recommend that her first-degree relatives make their healthcare providers aware of the family history and discuss their options for screening and risk-reduction  We discussed that though we cannot eliminate the risk of cancer based on family history, we have not identified a genetic mutation that would indicate a high risk for breast cancer  Eveline Arguello has 3 daughters and we reviewed that they should make their healthcare providers aware of the breast cancer in the family      Pancreatic Cancer Risk  We also reviewed Corazon's family history and recent identification of a pancreatic mass  Given this history, her daughter could qualify for increased pancreatic cancer screening based on NCCN guidelines  Guidelines recommend that screening could be considered in individuals with >3 first- and/or second-degree relatives with pancreatic cancer  This screening may include contrast-enhanced MRI/MRCP and/or endoscopic ultrasound (EUS)  I reviewed with Dannie Sutton that her daughters should make their healthcare providers aware of this history and pursue increased surveillance if interested  Testing for other Family Members: In some cases, it may be helpful for other relatives with a cancer diagnosis to undergo genetic testing, particularly if they were diagnosed at a young age  We encouraged Dannie Sutton to share her genetic test result with her sister who was diagnosed with breast cancer as they may benefit from genetic testing  Relatives who wish to pursue genetic testing can reach out to the [address] [place] [address] Road ([id number]) to schedule an appointment or visit www [facility] [place] to identify a local genetic counselor  At this time we do not recommend testing for Dannie Sutton 's children based on her negative test result  Dannie Sutton 's children still need to consider the history of cancer on the other side of their family when determining their risks  Plan:   Recommendations for Dannie Sutton are outlined below however the surveillance and medical management should continue as clinically indicated and as determined appropriate by her healthcare providers  Breast Cancer Screening:  Continue to follow screening as recommended by your healthcare provider  Colon Cancer Screening:   Population-based screening guidelines are appropriate  Gynecologic Cancer Screening/Risk Reduction  There are no standard screening recommendations for endometrial or ovarian cancer       Skin Cancer Screening   Continue skin cancer screening as recommended by your healthcare provider  Negative Result: Carrol Apgar was strongly encouraged to contact us regarding any changes in her personal or family history of cancer as these changes could alter our recommendation regarding genetic testing and/or cancer screening

## 2020-09-28 ENCOUNTER — APPOINTMENT (OUTPATIENT)
Dept: RADIOLOGY | Facility: MEDICAL CENTER | Age: 75
End: 2020-09-28
Payer: MEDICARE

## 2020-09-28 ENCOUNTER — CONSULT (OUTPATIENT)
Dept: PAIN MEDICINE | Facility: MEDICAL CENTER | Age: 75
End: 2020-09-28
Payer: MEDICARE

## 2020-09-28 VITALS
WEIGHT: 135 LBS | DIASTOLIC BLOOD PRESSURE: 76 MMHG | HEART RATE: 101 BPM | SYSTOLIC BLOOD PRESSURE: 170 MMHG | BODY MASS INDEX: 28.34 KG/M2 | TEMPERATURE: 97.7 F | HEIGHT: 58 IN

## 2020-09-28 DIAGNOSIS — G89.29 CHRONIC BILATERAL THORACIC BACK PAIN: ICD-10-CM

## 2020-09-28 DIAGNOSIS — G89.29 CHRONIC BILATERAL LOW BACK PAIN WITHOUT SCIATICA: Primary | ICD-10-CM

## 2020-09-28 DIAGNOSIS — M47.814 THORACIC SPONDYLOSIS: ICD-10-CM

## 2020-09-28 DIAGNOSIS — M54.6 CHRONIC BILATERAL THORACIC BACK PAIN: ICD-10-CM

## 2020-09-28 DIAGNOSIS — Z98.1 STATUS POST CERVICAL SPINAL FUSION: ICD-10-CM

## 2020-09-28 DIAGNOSIS — M46.1 SACROILIITIS (HCC): ICD-10-CM

## 2020-09-28 DIAGNOSIS — M54.2 NECK PAIN: ICD-10-CM

## 2020-09-28 DIAGNOSIS — M47.816 LUMBAR SPONDYLOSIS: ICD-10-CM

## 2020-09-28 DIAGNOSIS — G89.29 CHRONIC BILATERAL LOW BACK PAIN WITHOUT SCIATICA: ICD-10-CM

## 2020-09-28 DIAGNOSIS — M47.812 CERVICAL SPONDYLOSIS: ICD-10-CM

## 2020-09-28 DIAGNOSIS — M54.50 CHRONIC BILATERAL LOW BACK PAIN WITHOUT SCIATICA: Primary | ICD-10-CM

## 2020-09-28 DIAGNOSIS — M54.50 CHRONIC BILATERAL LOW BACK PAIN WITHOUT SCIATICA: ICD-10-CM

## 2020-09-28 PROCEDURE — 72050 X-RAY EXAM NECK SPINE 4/5VWS: CPT

## 2020-09-28 PROCEDURE — 72110 X-RAY EXAM L-2 SPINE 4/>VWS: CPT

## 2020-09-28 PROCEDURE — 72072 X-RAY EXAM THORAC SPINE 3VWS: CPT

## 2020-09-28 PROCEDURE — 99204 OFFICE O/P NEW MOD 45 MIN: CPT | Performed by: PHYSICAL MEDICINE & REHABILITATION

## 2020-09-28 NOTE — PROGRESS NOTES
Assessment  1  Chronic bilateral low back pain without sciatica    2  Lumbar spondylosis    3  Neck pain    4  Cervical spondylosis    5  Chronic bilateral thoracic back pain    6  Thoracic spondylosis    7  Sacroiliitis ([address] )    8  Status post cervical spinal fusion        Plan  1  Cervical spine x-ray  2  Thoracic spine x-ray  3  Lumbar spine x-ray  4  We will schedule the patient for bilateral L3-5 medial branch nerve blocks with intention of moving forward towards radiofrequency ablation if there is an appropriate diagnostic response  The initial blocks will be performed with 2% lidocaine and if an appropriate response is obtained upon review of the patient's pain diary, a confirmatory block will be scheduled with 0 5% bupivacaine  In the office today, we reviewed the nature of facet joint pathology in depth using a spine model  We discussed the approach we would use for the injections and provided literature for home review  The patient understands the risks associated with the procedure including bleeding, infection, tissue injury, and allergic reaction and provided verbal informed consent in the office today  5  Consider sacroiliac joint injections  6  Also consider cervical medial branch nerve blocks and RFA  7  Finally consider thoracic spine medial branch nerve blocks and RFA      My impressions and treatment recommendations were discussed in detail with the patient who verbalized understanding and had no further questions  Discharge instructions were provided  I personally saw and examined the patient and I agree with the above discussed plan of care  Orders Placed This Encounter   Procedures    XR spine cervical complete 4 or 5 vw non injury     Standing Status:   Future     Standing Expiration Date:   9/28/2024     Scheduling Instructions:      Bring along any outside films relating to this procedure             Order Specific Question:   Reason for Exam:     Answer:   neck pain    XR spine thoracic 2 vw     Standing Status:   Future     Standing Expiration Date:   9/28/2024     Scheduling Instructions:      Bring along any outside films relating to this procedure  Order Specific Question:   Reason for Exam:     Answer:   thoracic spine pain, scoliosis    X-ray lumbar spine complete 4+ views     Standing Status:   Future     Standing Expiration Date:   9/28/2024     Scheduling Instructions:      Bring along any outside films relating to this procedure   FL spine and pain procedure     Standing Status:   Future     Standing Expiration Date:   9/28/2021     Order Specific Question:   Reason for Exam:     Answer:   (B) L3-5 MBB#1     Order Specific Question:   Anticoagulant hold needed? Answer:   no    FL spine and pain procedure     Side to be determined by patient     Standing Status:   Future     Standing Expiration Date:   9/28/2021     Order Specific Question:   Reason for Exam:     Answer:   C4-6 MBB#1     Order Specific Question:   Anticoagulant hold needed? Answer:   no     No orders of the defined types were placed in this encounter  History of Present Illness    Sridevi Negrete is a 66 y o  female seen in consultation as a self-referral   The patient has a long history of chronic pain complaints affecting the cervical, thoracic, and lumbar regions of her spine  She was seeing a pain specialist in [place] and has moved back to the area  She is transitioning her care at this point and looking for interventional options  She is describing moderate to severe intensity pain throughout the entirety of her axial skeleton  She localizes the pain to her cervical spine, thoracic region, lumbar spine as well as sacroiliac joints bilaterally  She denies any radicular features into the extremities  She rates her pain as a 7/10 at its worst and states this is constant without any typical pattern and characterized as burning, sharp, throbbing, dull, aching    She does describe some subjective upper and lower extremity weakness  Aggravating factors include exercise  Past medical history significant for anxiety, depression, fibromyalgia, GERD, reflux, stomach ulcers, widespread arthritis, as well as breast and skin cancers  She has tried a number of different treatments in the past including excellent relief with injections and radiofrequency ablation  No relief with physical therapy, exercise, osteopathic manipulation, acupuncture, 10s unit, or chiropractic manipulation  She has history of significant allergies to multiple medications  I have personally reviewed and/or updated the patient's past medical history, past surgical history, family history, social history, current medications, allergies, and vital signs today  Review of Systems   Constitutional: Negative for fever and unexpected weight change  HENT: Negative for trouble swallowing  Eyes: Negative for visual disturbance  Respiratory: Negative for shortness of breath and wheezing  Cardiovascular: Negative for chest pain and palpitations  Gastrointestinal: Negative for constipation, diarrhea, nausea and vomiting  Endocrine: Negative for cold intolerance, heat intolerance and polydipsia  Genitourinary: Negative for difficulty urinating and frequency  Musculoskeletal: Positive for back pain, gait problem, myalgias and neck pain  Negative for arthralgias and joint swelling  Skin: Negative for rash  Neurological: Positive for dizziness, numbness and headaches  Negative for seizures, syncope and weakness  Hematological: Does not bruise/bleed easily  Psychiatric/Behavioral: Negative for dysphoric mood  All other systems reviewed and are negative        Patient Active Problem List   Diagnosis    Fibromyalgia    Glaucoma    Hyperlipidemia    Essential hypertension    Restless legs    Rheumatoid arthritis ([facility] [address] )    Pancreatic lesion    Anxiety    Dysthymia    Osteoarthritis of knee    Osteoarthritis of hip    Osteoporosis    Lumbosacral spondylosis without myelopathy    Macular degeneration    Chronic renal insufficiency, stage III (moderate) (HCC)    Stenosis of right carotid artery    Family history of pancreatic cancer    History of right knee joint replacement    Chronic instability of right knee    Scoliosis, or kyphoscoliosis, idiopathic    Greater trochanteric bursitis of right hip    History of hip replacement, total, right       Past Medical History:   Diagnosis Date    Benign paroxysmal positional vertigo due to bilateral vestibular disorder 1/28/2020    Breast cancer ([facility] [address] ) 2007    Bilateral mastectomy with chemotherapy    Chronic maxillary sinusitis 7/20/2020    Gastroesophageal reflux disease without esophagitis 1/28/2020    Hypertension     Hypertensive disorder 5/2/2019    Pancreatitis     PONV (postoperative nausea and vomiting)     Popliteal cyst, left 6/30/2020    PUD (peptic ulcer disease) 1/28/2020    Scoliosis     Skin cancer        Past Surgical History:   Procedure Laterality Date    BASAL CELL CARCINOMA EXCISION      nose    BREAST BIOPSY      BREAST IMPLANT Bilateral     BREAST SURGERY      double mastectomy    CARPAL TUNNEL RELEASE      CATARACT EXTRACTION      DILATION AND CURETTAGE OF UTERUS      GANGLION CYST EXCISION Left     wrist    HERNIA REPAIR      HIP SURGERY      HYSTERECTOMY      KNEE ARTHROSCOPY      KNEE SURGERY      MASTECTOMY Bilateral 2007    NASAL ENDOSCOPY W/ BALLON SINUPLASTY      x4     PLANTAR FASCIA SURGERY Bilateral 1990    [place] NASAL/SINUS ENDOSCOPY,RMV TISS MAXILL SINUS Right 7/27/2020    Procedure: FUNCTIONAL ENDOSCOPIC SINUS SURGERY (FESS) IMAGED GUIDED, MAXILLARY ANTROSTOM Y  ;SEPTOPLASTY;  Surgeon: Mica Suárez MD;  Location: [facility];  Service: ENT    RADIOFREQUENCY ABLATION      REFRACTIVE SURGERY      ROTATOR CUFF REPAIR Bilateral 2004 2005    TOTAL ABDOMINAL HYSTERECTOMY      w RSO    TRIGGER FINGER RELEASE      TUBAL LIGATION         Family History   Problem Relation Age of Onset    No Known Problems Mother     No Known Problems Father        Social History     Occupational History    Not on file   Tobacco Use    Smoking status: Former Smoker     Last attempt to quit: 1980     Years since quittin 7    Smokeless tobacco: Never Used   Substance and Sexual Activity    Alcohol use: Not Currently    Drug use: Never    Sexual activity: Not Currently       Current Outpatient Medications on File Prior to Visit   Medication Sig    acetaminophen (TYLENOL) 500 mg tablet Take 1 tablet (500 mg total) by mouth every 6 (six) hours as needed for mild pain    benazepril (LOTENSIN) 40 MG tablet Take 1 tablet (40 mg total) by mouth daily    Calcium Carb-Cholecalciferol (CALCIUM 600 + D PO) Take 1 tablet by mouth daily    Coenzyme Q10 (CO Q-10) 200 MG CAPS Take 1 tablet by mouth daily    latanoprost (XALATAN) 0 005 % ophthalmic solution Administer 1 drop to both eyes daily    MELATONIN PO Take by mouth    metoprolol succinate (TOPROL-XL) 50 mg 24 hr tablet Take 1 tablet (50 mg total) by mouth daily at bedtime    Multiple Vitamins-Minerals (PRESERVISION/LUTEIN PO) PreserVision Lutein    pravastatin (PRAVACHOL) 10 mg tablet Take 0 5 tablets (5 mg total) by mouth daily at bedtime    QUEtiapine (SEROquel) 25 mg tablet Take 0 5 tablets (12 5 mg total) by mouth daily at bedtime    SIMBRINZA 1-0 2 % SUSP INT 1 GTT IN OU BID  DISCONTINUE COMBIGAN    spironolactone (ALDACTONE) 25 mg tablet Take 1 tablet (25 mg total) by mouth daily     No current facility-administered medications on file prior to visit          Allergies   Allergen Reactions    Dorzolamide Hcl-Timolol Mal Other (See Comments)     Tongue burning    Doxazosin Shortness Of Breath    Gabapentin Other (See Comments)    Lexapro [Escitalopram] Tremor    Lorazepam Hallucinations    Amlodipine Itching     Excessive wt gain    Amoxicillin-Pot Clavulanate Diarrhea    Buprenorphine Hcl Hallucinations    Carbidopa      Severe muscle spasms    Celecoxib Diarrhea    Chlorthalidone Edema     Itchiness, facial swelling, rash    Clindamycin Diarrhea    Clonidine Other (See Comments)     Burning mouth/tongue    Denosumab     Doxycycline Diarrhea and Nausea Only    Fosamax [Alendronate]      Severe stomach pain    Hydrocodone Hallucinations    Hydrocodone-Acetaminophen Hallucinations    Ibandronic Acid      Acid reflux    Levodopa      svere muscle spasms    Milnacipran      Extreme cold feeling    Pregabalin      sedation    Proline      Arthralgia myalgia    Ropinirole Headache    Rosuvastatin Itching    Tramadol Nausea Only     Severe stomach pain    Zenpep [Pancrelipase (Lip-Prot-Amyl)] Diarrhea     svere    Cefdinir Diarrhea and Rash    Rotigotine Rash       Physical Exam    /76   Pulse 101   Temp 97 7 °F (36 5 °C) (Temporal)   Ht 4' 10" (1 473 m)   Wt 61 2 kg (135 lb)   BMI 28 22 kg/m²     General: Well-developed, well-nourished individual in no acute distress  Mental: Appropriate mood and affect  Grossly oriented with coherent speech and thought processing   Neuro:  Cranial nerves: Cranial nerve function is grossly intact bilaterally   Strength: Bilateral upper extremity strength is normal and symmetric   No atrophy or tone abnormalities noted   Reflexes: Bilateral upper extremity muscle stretch reflexes are physiologic and symmetric   No Jara sign   Sensation: No loss of sensation is noted   Gait:  Gait/gross motor: Gait is normal  Station is normal      Musculoskeletal:  Palpation: Inspection and palpation of the spine and extremities are unremarkable except for tenderness to palpation along the cervical facet joints, thoracic facet joints, and lumbar spine facet joints as well as the sacroiliac joints bilaterally    Spine:  Significant spinal motion limitations especially with cervical rotation, side bending, and extension all reproducing her pain complaint, significant limitation with Thoracic rotation as well as lumbar extension all reproducing pain    No gross axial skeletal deformities   Skin: Skin inspection grossly negative for erythema, breakdown, or concerning lesions in affected area   Lymph: No lymphadenopathy is appreciated in the involved extremity   Vessels: No lower extremity edema   Lungs: Breathing is comfortable and regular  No dyspnea noted during examination   Eyes: Visual field grossly intact to confrontation  No redness appreciated  ENT: No craniofacial deformities or asymmetry  No neck masses appreciated  Imaging    RIGHT HIP     INDICATION:   [id number]: Pain in right hip      COMPARISON:  Images from CT scan 10/7/2004     VIEWS:  XR HIP/PELV 2-3 VWS RIGHT W PELVIS IF PERFORMED         FINDINGS:     There is no acute fracture or dislocation      A hip prosthesis is present  No complications are seen  No lucency around the components  There is mild degenerative arthritis of the left hip    No lytic or blastic osseous lesion      Soft tissues are unremarkable      Lumbar spine not well seen although there does seem to be some scoliosis and degenerative change along the visualized lower lumbar segment      IMPRESSION:     Satisfactory appearance of right hip prosthesis     Workstation performed: [id number]

## 2020-09-28 NOTE — PATIENT INSTRUCTIONS
Arthritis   WHAT YOU NEED TO KNOW:   Arthritis is a disease that causes inflammation in one or more joints  There are many types of arthritis, such as osteoarthritis, rheumatoid arthritis, and septic arthritis  Some types cause inflammation in the joints  Other types wear away the cartilage between joints  This makes the bones of the joint rub together when you move the joint  Your symptoms may be constant, or symptoms may come and go  Arthritis often gets worse over time and can cause permanent joint damage  DISCHARGE INSTRUCTIONS:   Return to the emergency department if:   · You have a fever and severe joint pain or swelling  · You cannot move the affected joint  · You have severe joint pain you cannot tolerate  Contact your healthcare provider if:   · Your pain or swelling does not get better with treatment  · You have questions or concerns about your condition or care  Medicines:   · Acetaminophen  decreases pain and fever  It is available without a doctor's order  Ask how much to take and how often to take it  Follow directions  Acetaminophen can cause liver damage if not taken correctly  · NSAIDs , such as ibuprofen, help decrease swelling, pain, and fever  This medicine is available with or without a doctor's order  NSAIDs can cause stomach bleeding or kidney problems in certain people  If you take blood thinner medicine, always ask your healthcare provider if NSAIDs are safe for you  Always read the medicine label and follow directions  · Steroids  reduce swelling and pain  · Prescription pain medicine  may be given  Do not wait until the pain is severe before you take your medicine  Ask your healthcare provider how to take this medicine safely  · Take your medicine as directed  Contact your healthcare provider if you think your medicine is not helping or if you have side effects  Tell him of her if you are allergic to any medicine   Keep a list of the medicines, vitamins, and herbs you take  Include the amounts, and when and why you take them  Bring the list or the pill bottles to follow-up visits  Carry your medicine list with you in case of an emergency  Follow up with your healthcare provider or rheumatologist as directed:  Write down your questions so you remember to ask them during your visits  Manage arthritis:   · Rest your painful joint so it can heal   Your healthcare provider may recommend crutches or a walker if the affected joint is in a leg  · Apply ice or heat to the joint  Both can help decrease swelling and pain  Ice may also help prevent tissue damage  Use an ice pack, or put crushed ice in a plastic bag  Cover it with a towel and place it on your joint for 15 to 20 minutes every hour or as directed  You can apply heat for 20 minutes every 2 hours  Heat treatment includes hot packs or heat lamps  · Elevate your joint  Elevation helps reduce swelling and pain  Raise your joint above the level of your heart as often as you can  Prop your painful joint on pillows to keep it above your heart comfortably  · Go to therapy as directed  A physical therapist can teach you exercises to improve flexibility and range of motion  You may also be shown non-weight-bearing exercises that are safe for your joints, such as swimming  Exercise can help keep your joints flexible and reduce pain  An occupational therapist can help you learn to do your daily activities when your joints are stiff or sore  · Maintain a healthy weight  Extra weight puts increased pressure on your joints  Ask your healthcare provider what you should weigh  If you need to lose weight, he can help you create a weight loss program  Weight loss can help reduce pain and increase your ability to do your activities  The amount of exercise you do may vary each day, depending on your symptoms  · Wear flat or low-heeled shoes    This will help decrease pain and reduce pressure on your ankle, knee, and hip joints  · Use support devices  You may be given splints to wear on your hands to help your joints rest and to decrease inflammation  While you sleep, use a pillow that is firm enough to support your neck and head  Support equipment:  The following may help you move and prevent falls:  · Orthotic shoes or insoles  help support your feet when you walk  · Crutches, a cane, or a walker  may help decrease your risk for falling  They also decrease stress on affected joints  · Devices to prevent falls  include raised toilet seats and bathtub bars to help you get up from sitting  Handrails can be placed in areas where you need balance and support  © 2017 [address] Information is for End User's use only and may not be sold, redistributed or otherwise used for commercial purposes  All illustrations and images included in CareNotes® are the copyrighted property of A [employer]  or Dexter Thomas  The above information is an  only  It is not intended as medical advice for individual conditions or treatments  Talk to your doctor, nurse or pharmacist before following any medical regimen to see if it is safe and effective for you

## 2020-09-29 ENCOUNTER — OFFICE VISIT (OUTPATIENT)
Dept: INTERNAL MEDICINE CLINIC | Facility: CLINIC | Age: 75
End: 2020-09-29
Payer: MEDICARE

## 2020-09-29 VITALS
SYSTOLIC BLOOD PRESSURE: 114 MMHG | HEIGHT: 58 IN | TEMPERATURE: 98.1 F | HEART RATE: 65 BPM | BODY MASS INDEX: 28.29 KG/M2 | OXYGEN SATURATION: 97 % | DIASTOLIC BLOOD PRESSURE: 70 MMHG | RESPIRATION RATE: 18 BRPM | WEIGHT: 134.8 LBS

## 2020-09-29 DIAGNOSIS — F41.9 ANXIETY: Primary | ICD-10-CM

## 2020-09-29 DIAGNOSIS — Z23 NEED FOR INFLUENZA VACCINATION: ICD-10-CM

## 2020-09-29 DIAGNOSIS — F34.1 DYSTHYMIA: ICD-10-CM

## 2020-09-29 DIAGNOSIS — I10 ESSENTIAL HYPERTENSION: ICD-10-CM

## 2020-09-29 PROCEDURE — 90662 IIV NO PRSV INCREASED AG IM: CPT

## 2020-09-29 PROCEDURE — G0008 ADMIN INFLUENZA VIRUS VAC: HCPCS

## 2020-09-29 PROCEDURE — 99214 OFFICE O/P EST MOD 30 MIN: CPT | Performed by: INTERNAL MEDICINE

## 2020-09-29 RX ORDER — CITALOPRAM 10 MG/1
10 TABLET ORAL DAILY
Qty: 30 TABLET | Refills: 2 | Status: SHIPPED | OUTPATIENT
Start: 2020-09-29 | End: 2020-11-06 | Stop reason: SDUPTHER

## 2020-09-29 NOTE — PROGRESS NOTES
Assessment/Plan:    Essential hypertension  Controlled  Continue benazepril 40 mg daily, metoprolol succinate 50 mg daily, and spironolactone 25 mg daily  Will recheck BMP in 3 months  Anxiety  Discontinue Zoloft due to adverse effects  Will restart citalopram 10 mg daily as she has been on this in the past without adverse effects  Advised she use melatonin at bedtime for insomnia  She is to contact office for worsening symptoms or adverse effects  Follow-up in 3 months  Diagnoses and all orders for this visit:    Anxiety  -     citalopram (CeleXA) 10 mg tablet; Take 1 tablet (10 mg total) by mouth daily    Dysthymia  -     citalopram (CeleXA) 10 mg tablet; Take 1 tablet (10 mg total) by mouth daily    Essential hypertension  -     Basic metabolic panel; Future                Time spent during encounter: 25 minutes (counseling, reviewing medications, and discussing treatment and plan)    Subjective:      Patient ID: Justin Corona is a 66 y o  female  Chief Complaint   Patient presents with    Follow-up     4 week  seen on 9/1 had  9/8    Medication Problem     Quetiapine Stopped Friday  Because she gained weight and was up to 140lb  Was sweating profusely changed her clothes multiple times a day and was not sleeping on the pill  Became very depressed   Blood Pressure Check     brought in a list of blood pressures for september 66year old female is seen today for 1 month follow up for management of anxiety and HTN  She has been compliant with her medication regimen  She is currently ion spironolactone 25 mg daily, metoprolol succinate 50 mg daily, and benazepril 40 mg daily  She has been checking her blood pressure at home and in reviewing blood pressure log, her average blood pressure readings are 125 mm Hg over 65-70 mm Hg  She denies any side effects since increasing spironolactone    BMP after increasing spironolactone shows a potassium level 5 2 with a stable kidney function  In regards to anxiety, she was started on Zoloft as she was having side effects with Lexapro (tremors) however since starting Zoloft she was experiencing profuse sweating and weight gain  She stop taking Zoloft on 09/25/2020  She is experiencing worsening anxiety and depression  And reports good control of anxiety/depression then however was discontinued for an unknown reason  She denies any known side effects when she was taking citalopram     Hypertension   This is a chronic problem  The current episode started more than 1 year ago  The problem has been gradually improving since onset  The problem is controlled  Associated symptoms include anxiety  Pertinent negatives include no chest pain, headaches, palpitations or shortness of breath  Past treatments include ACE inhibitors, beta blockers and diuretics  The current treatment provides moderate improvement  There are no compliance problems  Anxiety   Presents for follow-up visit  Symptoms include decreased concentration, depressed mood, excessive worry, insomnia and nervous/anxious behavior  Patient reports no chest pain, dizziness, nausea, palpitations, shortness of breath or suicidal ideas  Symptoms occur most days  The severity of symptoms is moderate  The patient sleeps 6 hours per night  The quality of sleep is non-restorative  The following portions of the patient's history were reviewed and updated as appropriate: allergies, current medications, past family history, past medical history, past social history, past surgical history and problem list     Review of Systems   Constitutional: Negative for activity change, appetite change, chills, diaphoresis, fatigue and fever  HENT: Negative for congestion, postnasal drip, rhinorrhea, sinus pressure, sinus pain, sneezing and sore throat  Eyes: Negative for visual disturbance  Respiratory: Negative for apnea, cough, choking, chest tightness, shortness of breath and wheezing  Cardiovascular: Negative for chest pain, palpitations and leg swelling  Gastrointestinal: Negative for abdominal distention, abdominal pain, anal bleeding, blood in stool, constipation, diarrhea, nausea and vomiting  Endocrine: Negative for cold intolerance and heat intolerance  Genitourinary: Negative for difficulty urinating, dysuria and hematuria  Musculoskeletal: Negative  Skin: Negative  Neurological: Negative for dizziness, weakness, light-headedness, numbness and headaches  Hematological: Negative for adenopathy  Psychiatric/Behavioral: Positive for decreased concentration  Negative for agitation, sleep disturbance and suicidal ideas  The patient is nervous/anxious and has insomnia  All other systems reviewed and are negative          Past Medical History:   Diagnosis Date    Benign paroxysmal positional vertigo due to bilateral vestibular disorder 1/28/2020    Breast cancer ([facility] [address] ) 2007    Bilateral mastectomy with chemotherapy    Chronic maxillary sinusitis 7/20/2020    Gastroesophageal reflux disease without esophagitis 1/28/2020    Hypertension     Hypertensive disorder 5/2/2019    Pancreatitis     PONV (postoperative nausea and vomiting)     Popliteal cyst, left 6/30/2020    PUD (peptic ulcer disease) 1/28/2020    Scoliosis     Skin cancer          Current Outpatient Medications:     acetaminophen (TYLENOL) 500 mg tablet, Take 1 tablet (500 mg total) by mouth every 6 (six) hours as needed for mild pain, Disp: 30 tablet, Rfl: 0    benazepril (LOTENSIN) 40 MG tablet, Take 1 tablet (40 mg total) by mouth daily, Disp: 90 tablet, Rfl: 1    Calcium Carb-Cholecalciferol (CALCIUM 600 + D PO), Take 1 tablet by mouth daily, Disp: , Rfl:     Coenzyme Q10 (CO Q-10) 200 MG CAPS, Take 1 tablet by mouth daily, Disp: , Rfl:     latanoprost (XALATAN) 0 005 % ophthalmic solution, Administer 1 drop to both eyes daily, Disp: , Rfl:     metoprolol succinate (TOPROL-XL) 50 mg 24 hr tablet, Take 1 tablet (50 mg total) by mouth daily at bedtime, Disp: 90 tablet, Rfl: 1    Multiple Vitamins-Minerals (PRESERVISION/LUTEIN PO), PreserVision Lutein, Disp: , Rfl:     pravastatin (PRAVACHOL) 10 mg tablet, Take 0 5 tablets (5 mg total) by mouth daily at bedtime, Disp: 30 tablet, Rfl: 5    SIMBRINZA 1-0 2 % SUSP, INT 1 GTT IN OU BID   DISCONTINUE COMBIGAN, Disp: , Rfl:     spironolactone (ALDACTONE) 25 mg tablet, Take 1 tablet (25 mg total) by mouth daily, Disp: 30 tablet, Rfl: 0    citalopram (CeleXA) 10 mg tablet, Take 1 tablet (10 mg total) by mouth daily, Disp: 30 tablet, Rfl: 2    MELATONIN PO, Take by mouth, Disp: , Rfl:     QUEtiapine (SEROquel) 25 mg tablet, Take 0 5 tablets (12 5 mg total) by mouth daily at bedtime (Patient not taking: Reported on 9/29/2020), Disp: 15 tablet, Rfl: 3    Allergies   Allergen Reactions    Dorzolamide Hcl-Timolol Mal Other (See Comments)     Tongue burning    Doxazosin Shortness Of Breath    Gabapentin Other (See Comments)    Lexapro [Escitalopram] Tremor    Lorazepam Hallucinations    Zoloft [Sertraline] Other (See Comments)     Diaphoresis and weight gain      Amlodipine Itching     Excessive wt gain    Amoxicillin-Pot Clavulanate Diarrhea    Buprenorphine Hcl Hallucinations    Carbidopa      Severe muscle spasms    Celecoxib Diarrhea    Chlorthalidone Edema     Itchiness, facial swelling, rash    Clindamycin Diarrhea    Clonidine Other (See Comments)     Burning mouth/tongue    Denosumab     Doxycycline Diarrhea and Nausea Only    Fosamax [Alendronate]      Severe stomach pain    Hydrocodone Hallucinations    Hydrocodone-Acetaminophen Hallucinations    Ibandronic Acid      Acid reflux    Levodopa      svere muscle spasms    Milnacipran      Extreme cold feeling    Pregabalin      sedation    Proline      Arthralgia myalgia    Ropinirole Headache    Rosuvastatin Itching    Tramadol Nausea Only     Severe stomach pain    Zenpep [Pancrelipase (Lip-Prot-Amyl)] Diarrhea     svere    Cefdinir Diarrhea and Rash    Rotigotine Rash       Social History   Past Surgical History:   Procedure Laterality Date    BASAL CELL CARCINOMA EXCISION      nose    BREAST BIOPSY      BREAST IMPLANT Bilateral     BREAST SURGERY      double mastectomy    CARPAL TUNNEL RELEASE      CATARACT EXTRACTION      DILATION AND CURETTAGE OF UTERUS      GANGLION CYST EXCISION Left     wrist    HERNIA REPAIR      HIP SURGERY      HYSTERECTOMY      KNEE ARTHROSCOPY      KNEE SURGERY      MASTECTOMY Bilateral 2007    NASAL ENDOSCOPY W/ BALLON SINUPLASTY      x4     PLANTAR FASCIA SURGERY Bilateral 1990    SD NASAL/SINUS ENDOSCOPY,RMV TISS MAXILL SINUS Right 7/27/2020    Procedure: FUNCTIONAL ENDOSCOPIC SINUS SURGERY (FESS) IMAGED GUIDED, MAXILLARY ANTROSTOM Y  ;SEPTOPLASTY;  Surgeon: Ebony Hager MD;  Location: [facility];  Service: ENT   [address]      ROTATOR CUFF REPAIR Bilateral 2004 2005    TOTAL ABDOMINAL HYSTERECTOMY      w RSO    TRIGGER FINGER RELEASE      TUBAL LIGATION       Family History   Problem Relation Age of Onset    No Known Problems Mother     No Known Problems Father        Objective:  /70 (BP Location: Left arm, Patient Position: Sitting, Cuff Size: Standard)   Pulse 65   Temp 98 1 °F (36 7 °C) Comment (Src): forehead  Resp 18   Ht 4' 10" (1 473 m)   Wt 61 1 kg (134 lb 12 8 oz)   SpO2 97%   BMI 28 17 kg/m²     Recent Results (from the past 1344 hour(s))   Lipid panel    Collection Time: 08/25/20 10:02 AM   Result Value Ref Range    Cholesterol 252 (H) 50 - 200 mg/dL    Triglycerides 152 (H) <=150 mg/dL    HDL, Direct 65 >=40 mg/dL    LDL Calculated 157 (H) 0 - 100 mg/dL    Non-HDL-Chol (CHOL-HDL) 187 mg/dl   Hepatitis C antibody    Collection Time: 08/25/20 10:02 AM   Result Value Ref Range    Hepatitis C Ab Non-reactive Non-reactive   Basic metabolic panel    Collection Time: 08/25/20 10:02 AM   Result Value Ref Range    Sodium 141 136 - 145 mmol/L    Potassium 4 2 3 5 - 5 3 mmol/L    Chloride 110 (H) 100 - 108 mmol/L    CO2 25 21 - 32 mmol/L    ANION GAP 6 4 - 13 mmol/L    BUN 18 5 - 25 mg/dL    Creatinine 1 09 0 60 - 1 30 mg/dL    Glucose, Fasting 93 65 - 99 mg/dL    Calcium 9 3 8 3 - 10 1 mg/dL    eGFR 49 ml/min/1 73sq m   Basic metabolic panel    Collection Time: 09/08/20  9:34 AM   Result Value Ref Range    Sodium 141 136 - 145 mmol/L    Potassium 5 2 3 5 - 5 3 mmol/L    Chloride 109 (H) 100 - 108 mmol/L    CO2 29 21 - 32 mmol/L    ANION GAP 3 (L) 4 - 13 mmol/L    BUN 24 5 - 25 mg/dL    Creatinine 1 06 0 60 - 1 30 mg/dL    Glucose, Fasting 86 65 - 99 mg/dL    Calcium 9 6 8 3 - 10 1 mg/dL    eGFR 51 ml/min/1 73sq m            Physical Exam  Vitals signs and nursing note reviewed  Constitutional:       General: She is not in acute distress  Appearance: She is well-developed  She is not diaphoretic  HENT:      Head: Normocephalic and atraumatic  Eyes:      General:         Right eye: No discharge  Left eye: No discharge  Conjunctiva/sclera: Conjunctivae normal       Pupils: Pupils are equal, round, and reactive to light  Neck:      Musculoskeletal: Normal range of motion and neck supple  Thyroid: No thyromegaly  Vascular: No JVD  Cardiovascular:      Rate and Rhythm: Normal rate and regular rhythm  Heart sounds: Normal heart sounds  No murmur  No friction rub  No gallop  Pulmonary:      Effort: Pulmonary effort is normal  No respiratory distress  Breath sounds: Normal breath sounds  No wheezing or rales  Chest:      Chest wall: No tenderness  Abdominal:      General: There is no distension  Palpations: Abdomen is soft  Tenderness: There is no abdominal tenderness  Musculoskeletal: Normal range of motion  General: No tenderness or deformity  Lymphadenopathy:      Cervical: No cervical adenopathy     Skin: General: Skin is warm and dry  Coloration: Skin is not pale  Findings: No erythema or rash  Neurological:      Mental Status: She is alert and oriented to person, place, and time  Cranial Nerves: No cranial nerve deficit  Coordination: Coordination normal    Psychiatric:         Behavior: Behavior normal          Thought Content:  Thought content normal          Judgment: Judgment normal

## 2020-09-29 NOTE — ASSESSMENT & PLAN NOTE
Controlled  Continue benazepril 40 mg daily, metoprolol succinate 50 mg daily, and spironolactone 25 mg daily  Will recheck BMP in 3 months

## 2020-09-29 NOTE — ASSESSMENT & PLAN NOTE
Discontinue Zoloft due to adverse effects  Will restart citalopram 10 mg daily as she has been on this in the past without adverse effects  Advised she use melatonin at bedtime for insomnia  She is to contact office for worsening symptoms or adverse effects  Follow-up in 3 months

## 2020-10-05 ENCOUNTER — TELEPHONE (OUTPATIENT)
Dept: PAIN MEDICINE | Facility: MEDICAL CENTER | Age: 75
End: 2020-10-05

## 2020-10-12 LAB — MISCELLANEOUS LAB TEST RESULT: NORMAL

## 2020-10-14 ENCOUNTER — HOSPITAL ENCOUNTER (OUTPATIENT)
Dept: RADIOLOGY | Facility: MEDICAL CENTER | Age: 75
Discharge: HOME | End: 2020-10-14
Attending: PHYSICAL MEDICINE & REHABILITATION | Admitting: PHYSICAL MEDICINE & REHABILITATION
Payer: MEDICARE

## 2020-10-14 VITALS
TEMPERATURE: 98.9 F | DIASTOLIC BLOOD PRESSURE: 73 MMHG | HEART RATE: 68 BPM | RESPIRATION RATE: 20 BRPM | SYSTOLIC BLOOD PRESSURE: 123 MMHG | OXYGEN SATURATION: 93 %

## 2020-10-14 DIAGNOSIS — G89.29 CHRONIC BILATERAL LOW BACK PAIN WITHOUT SCIATICA: ICD-10-CM

## 2020-10-14 DIAGNOSIS — I10 ESSENTIAL HYPERTENSION: ICD-10-CM

## 2020-10-14 DIAGNOSIS — M54.50 CHRONIC BILATERAL LOW BACK PAIN WITHOUT SCIATICA: ICD-10-CM

## 2020-10-14 DIAGNOSIS — M47.816 LUMBAR SPONDYLOSIS: ICD-10-CM

## 2020-10-14 PROCEDURE — 64494 INJ PARAVERT F JNT L/S 2 LEV: CPT | Performed by: PHYSICAL MEDICINE & REHABILITATION

## 2020-10-14 PROCEDURE — 64493 INJ PARAVERT F JNT L/S 1 LEV: CPT | Performed by: PHYSICAL MEDICINE & REHABILITATION

## 2020-10-14 RX ORDER — SPIRONOLACTONE 25 MG/1
25 TABLET ORAL DAILY
Qty: 30 TABLET | Refills: 0 | Status: SHIPPED | OUTPATIENT
Start: 2020-10-14 | End: 2020-10-14

## 2020-10-14 RX ORDER — SPIRONOLACTONE 25 MG/1
TABLET ORAL
Qty: 90 TABLET | Refills: 0 | Status: SHIPPED | OUTPATIENT
Start: 2020-10-14 | End: 2021-01-14 | Stop reason: SDUPTHER

## 2020-10-14 RX ADMIN — Medication 3 ML: at 13:20

## 2020-10-16 ENCOUNTER — TELEPHONE (OUTPATIENT)
Dept: RADIOLOGY | Facility: MEDICAL CENTER | Age: 75
End: 2020-10-16

## 2020-10-28 ENCOUNTER — HOSPITAL ENCOUNTER (OUTPATIENT)
Dept: RADIOLOGY | Facility: MEDICAL CENTER | Age: 75
Discharge: HOME | End: 2020-10-28
Attending: PHYSICAL MEDICINE & REHABILITATION | Admitting: PHYSICAL MEDICINE & REHABILITATION
Payer: MEDICARE

## 2020-10-28 VITALS
TEMPERATURE: 99 F | SYSTOLIC BLOOD PRESSURE: 122 MMHG | OXYGEN SATURATION: 99 % | DIASTOLIC BLOOD PRESSURE: 78 MMHG | HEART RATE: 70 BPM | RESPIRATION RATE: 20 BRPM

## 2020-10-28 DIAGNOSIS — M54.50 LOW BACK PAIN: ICD-10-CM

## 2020-10-28 DIAGNOSIS — M47.816 LUMBAR SPONDYLOSIS: ICD-10-CM

## 2020-10-28 PROCEDURE — 64494 INJ PARAVERT F JNT L/S 2 LEV: CPT | Performed by: PHYSICAL MEDICINE & REHABILITATION

## 2020-10-28 PROCEDURE — 64493 INJ PARAVERT F JNT L/S 1 LEV: CPT | Performed by: PHYSICAL MEDICINE & REHABILITATION

## 2020-10-28 RX ORDER — BUPIVACAINE HYDROCHLORIDE 5 MG/ML
10 INJECTION, SOLUTION EPIDURAL; INTRACAUDAL ONCE
Status: COMPLETED | OUTPATIENT
Start: 2020-10-28 | End: 2020-10-28

## 2020-10-28 RX ADMIN — BUPIVACAINE HYDROCHLORIDE 3 ML: 5 INJECTION, SOLUTION EPIDURAL; INTRACAUDAL at 14:36

## 2020-10-30 ENCOUNTER — TELEPHONE (OUTPATIENT)
Dept: GASTROENTEROLOGY | Facility: AMBULARY SURGERY CENTER | Age: 75
End: 2020-10-30

## 2020-10-30 DIAGNOSIS — K86.1 CHRONIC PANCREATITIS, UNSPECIFIED PANCREATITIS TYPE (HCC): Primary | ICD-10-CM

## 2020-11-06 DIAGNOSIS — F34.1 DYSTHYMIA: ICD-10-CM

## 2020-11-06 DIAGNOSIS — F41.9 ANXIETY: ICD-10-CM

## 2020-11-09 RX ORDER — CITALOPRAM 20 MG/1
20 TABLET ORAL DAILY
Qty: 90 TABLET | Refills: 0 | Status: SHIPPED | OUTPATIENT
Start: 2020-11-09 | End: 2021-02-09 | Stop reason: SDUPTHER

## 2020-11-10 ENCOUNTER — TELEPHONE (OUTPATIENT)
Dept: RADIOLOGY | Facility: MEDICAL CENTER | Age: 75
End: 2020-11-10

## 2020-11-10 DIAGNOSIS — M54.50 LOW BACK PAIN: ICD-10-CM

## 2020-11-10 DIAGNOSIS — M47.816 LUMBAR SPONDYLOSIS: Primary | ICD-10-CM

## 2020-11-17 ENCOUNTER — TELEPHONE (OUTPATIENT)
Dept: GASTROENTEROLOGY | Facility: MEDICAL CENTER | Age: 75
End: 2020-11-17

## 2020-11-24 ENCOUNTER — HOSPITAL ENCOUNTER (OUTPATIENT)
Dept: RADIOLOGY | Facility: MEDICAL CENTER | Age: 75
Discharge: HOME | End: 2020-11-24
Attending: PHYSICAL MEDICINE & REHABILITATION | Admitting: PHYSICAL MEDICINE & REHABILITATION
Payer: MEDICARE

## 2020-11-24 VITALS
SYSTOLIC BLOOD PRESSURE: 139 MMHG | DIASTOLIC BLOOD PRESSURE: 65 MMHG | TEMPERATURE: 98.4 F | OXYGEN SATURATION: 98 % | HEART RATE: 70 BPM | RESPIRATION RATE: 14 BRPM

## 2020-11-24 DIAGNOSIS — M54.50 LOW BACK PAIN: ICD-10-CM

## 2020-11-24 DIAGNOSIS — M47.816 LUMBAR SPONDYLOSIS: ICD-10-CM

## 2020-11-24 PROCEDURE — 64635 DESTROY LUMB/SAC FACET JNT: CPT | Performed by: PHYSICAL MEDICINE & REHABILITATION

## 2020-11-24 PROCEDURE — 64636 DESTROY L/S FACET JNT ADDL: CPT | Performed by: PHYSICAL MEDICINE & REHABILITATION

## 2020-11-24 RX ORDER — LIDOCAINE HYDROCHLORIDE 10 MG/ML
5 INJECTION, SOLUTION EPIDURAL; INFILTRATION; INTRACAUDAL; PERINEURAL ONCE
Status: COMPLETED | OUTPATIENT
Start: 2020-11-24 | End: 2020-11-24

## 2020-11-24 RX ORDER — BUPIVACAINE HCL/PF 2.5 MG/ML
10 VIAL (ML) INJECTION ONCE
Status: COMPLETED | OUTPATIENT
Start: 2020-11-24 | End: 2020-11-24

## 2020-11-24 RX ADMIN — Medication 5 ML: at 10:40

## 2020-11-24 RX ADMIN — Medication 5 ML: at 10:46

## 2020-11-24 RX ADMIN — LIDOCAINE HYDROCHLORIDE 2.5 ML: 10 INJECTION, SOLUTION EPIDURAL; INFILTRATION; INTRACAUDAL; PERINEURAL at 10:35

## 2020-11-25 ENCOUNTER — TELEPHONE (OUTPATIENT)
Dept: PAIN MEDICINE | Facility: MEDICAL CENTER | Age: 75
End: 2020-11-25

## 2020-12-08 ENCOUNTER — HOSPITAL ENCOUNTER (OUTPATIENT)
Dept: RADIOLOGY | Facility: MEDICAL CENTER | Age: 75
Discharge: HOME | End: 2020-12-08
Attending: PHYSICAL MEDICINE & REHABILITATION | Admitting: PHYSICAL MEDICINE & REHABILITATION
Payer: MEDICARE

## 2020-12-08 ENCOUNTER — TELEPHONE (OUTPATIENT)
Dept: PAIN MEDICINE | Facility: MEDICAL CENTER | Age: 75
End: 2020-12-08

## 2020-12-08 VITALS
RESPIRATION RATE: 20 BRPM | OXYGEN SATURATION: 95 % | DIASTOLIC BLOOD PRESSURE: 76 MMHG | HEART RATE: 69 BPM | SYSTOLIC BLOOD PRESSURE: 145 MMHG

## 2020-12-08 DIAGNOSIS — M47.816 LUMBAR SPONDYLOSIS: ICD-10-CM

## 2020-12-08 DIAGNOSIS — M54.50 LOW BACK PAIN: ICD-10-CM

## 2020-12-08 PROCEDURE — 64635 DESTROY LUMB/SAC FACET JNT: CPT | Performed by: PHYSICAL MEDICINE & REHABILITATION

## 2020-12-08 PROCEDURE — 64636 DESTROY L/S FACET JNT ADDL: CPT | Performed by: PHYSICAL MEDICINE & REHABILITATION

## 2020-12-08 RX ORDER — LIDOCAINE HYDROCHLORIDE 10 MG/ML
5 INJECTION, SOLUTION EPIDURAL; INFILTRATION; INTRACAUDAL; PERINEURAL ONCE
Status: COMPLETED | OUTPATIENT
Start: 2020-12-08 | End: 2020-12-08

## 2020-12-08 RX ORDER — BUPIVACAINE HCL/PF 2.5 MG/ML
10 VIAL (ML) INJECTION ONCE
Status: COMPLETED | OUTPATIENT
Start: 2020-12-08 | End: 2020-12-08

## 2020-12-08 RX ADMIN — Medication 5 ML: at 12:03

## 2020-12-08 RX ADMIN — Medication 5 ML: at 12:09

## 2020-12-08 RX ADMIN — LIDOCAINE HYDROCHLORIDE 2.5 ML: 10 INJECTION, SOLUTION EPIDURAL; INFILTRATION; INTRACAUDAL; PERINEURAL at 12:00

## 2020-12-09 NOTE — TELEPHONE ENCOUNTER
CLEM    S/w pt who states that she did "good" overnight  Pt currently has no pain  Pt denies any s/s of infection such as redness, drainage, swelling, or fevers  Pt denies sun-burn like sensation  Pt denies ice/heat to the area  Confirmed 1/6/2021 f/u visit w/ AO at 2 pm arrival time w/ pt  Pt appreciative

## 2020-12-29 ENCOUNTER — APPOINTMENT (OUTPATIENT)
Dept: LAB | Facility: IMAGING CENTER | Age: 75
End: 2020-12-29
Payer: MEDICARE

## 2020-12-29 DIAGNOSIS — I10 ESSENTIAL HYPERTENSION: ICD-10-CM

## 2020-12-29 LAB
ANION GAP SERPL CALCULATED.3IONS-SCNC: 2 MMOL/L (ref 4–13)
BUN SERPL-MCNC: 25 MG/DL (ref 5–25)
CALCIUM SERPL-MCNC: 9.8 MG/DL (ref 8.3–10.1)
CHLORIDE SERPL-SCNC: 112 MMOL/L (ref 100–108)
CO2 SERPL-SCNC: 26 MMOL/L (ref 21–32)
CREAT SERPL-MCNC: 1.17 MG/DL (ref 0.6–1.3)
GFR SERPL CREATININE-BSD FRML MDRD: 45 ML/MIN/1.73SQ M
GLUCOSE P FAST SERPL-MCNC: 91 MG/DL (ref 65–99)
POTASSIUM SERPL-SCNC: 5.6 MMOL/L (ref 3.5–5.3)
SODIUM SERPL-SCNC: 140 MMOL/L (ref 136–145)

## 2020-12-29 PROCEDURE — 36415 COLL VENOUS BLD VENIPUNCTURE: CPT

## 2020-12-29 PROCEDURE — 80048 BASIC METABOLIC PNL TOTAL CA: CPT

## 2021-01-04 ENCOUNTER — OFFICE VISIT (OUTPATIENT)
Dept: INTERNAL MEDICINE CLINIC | Facility: CLINIC | Age: 76
End: 2021-01-04
Payer: MEDICARE

## 2021-01-04 VITALS
BODY MASS INDEX: 28.55 KG/M2 | TEMPERATURE: 98.4 F | HEIGHT: 58 IN | HEART RATE: 65 BPM | SYSTOLIC BLOOD PRESSURE: 122 MMHG | WEIGHT: 136 LBS | DIASTOLIC BLOOD PRESSURE: 82 MMHG | OXYGEN SATURATION: 98 %

## 2021-01-04 DIAGNOSIS — I10 ESSENTIAL HYPERTENSION: Primary | ICD-10-CM

## 2021-01-04 DIAGNOSIS — E87.5 HYPERKALEMIA: ICD-10-CM

## 2021-01-04 DIAGNOSIS — F34.1 DYSTHYMIA: ICD-10-CM

## 2021-01-04 DIAGNOSIS — E78.2 MIXED HYPERLIPIDEMIA: ICD-10-CM

## 2021-01-04 DIAGNOSIS — F41.9 ANXIETY: ICD-10-CM

## 2021-01-04 DIAGNOSIS — M19.072 ARTHRITIS OF LEFT FOOT: ICD-10-CM

## 2021-01-04 DIAGNOSIS — M65.341 TRIGGER RING FINGER OF RIGHT HAND: ICD-10-CM

## 2021-01-04 PROCEDURE — 99214 OFFICE O/P EST MOD 30 MIN: CPT | Performed by: INTERNAL MEDICINE

## 2021-01-04 RX ORDER — ATORVASTATIN CALCIUM 10 MG/1
5 TABLET, FILM COATED ORAL DAILY
Qty: 45 TABLET | Refills: 0 | Status: SHIPPED | OUTPATIENT
Start: 2021-01-04 | End: 2021-05-14 | Stop reason: SDUPTHER

## 2021-01-04 NOTE — PROGRESS NOTES
Assessment/Plan:    Essential hypertension  Controlled  Continue current regimen:  Spironolactone 25 mg daily, metoprolol succinate 50 mg daily, and benazepril 40 mg daily  Will recheck BMP  If potassium remains elevated, will decrease spironolactone dose  Hyperlipidemia  Continue statin therapy however due to insurance coverage, will switch from pravastatin to atorvastatin 5 mg daily  Anxiety  Stable  Continue Celexa 20 mg daily  Arthritis of left foot  She is requesting referral to Podiatry for further evaluation  Discussed taking Tylenol as needed for pain as well as range of motion and stretching exercises  Trigger ring finger of right hand  Will refer to Orthopedic surgery, Hand Specialty for further evaluation  Diagnoses and all orders for this visit:    Essential hypertension  -     Lipid panel; Future  -     CBC; Future  -     Comprehensive metabolic panel; Future    Hyperkalemia  -     Comprehensive metabolic panel; Future  -     Basic metabolic panel; Future    Mixed hyperlipidemia  -     atorvastatin (LIPITOR) 10 mg tablet; Take 0 5 tablets (5 mg total) by mouth daily  -     Lipid panel; Future  -     CBC; Future  -     Comprehensive metabolic panel; Future    Dysthymia    Anxiety    Trigger ring finger of right hand  -     Ambulatory referral to Orthopedic Surgery; Future    Arthritis of left foot  -     Ambulatory referral to Podiatry; Future                Time spent during encounter: 25 minutes (counseling, reviewing medications, and discussing treatment and plan)    Subjective:      Patient ID: Emeterio Doshi is a 66 y o  female  Chief Complaint   Patient presents with    Follow-up     3 month follow up   C/O right ring finger and middle fringer locks and is painful/ Left foot painful and burning in the arch of foot/     Hyperlipidemia     Pt insurance will now cover medications Atorvastatin and Simvastatin for a lower cost then her Pravatatin       66year old female is seen today for follow up of chronic conditions  BMP reviewed today, potassium was at 5 6  She has been compliant with her medication regimen  She has been experiencing locking of her right 4th digit since past few months  And has progressively worsened to the point where she has extreme difficulty with relaxing/on locking her finger  She is also experiencing right middle finger pain  She denies any recent injury or trauma to the finger  The she denies any swelling, erythema, or increased warmth  She has been experiencing left foot pain, both dorsal and plantar surfaces, since 2 months  She denies any recent injury  She describes the pain as burning  Hyperlipidemia  This is a chronic problem  The problem is controlled  Recent lipid tests were reviewed and are normal  Pertinent negatives include no chest pain or shortness of breath  Current antihyperlipidemic treatment includes statins  The current treatment provides moderate improvement of lipids  There are no compliance problems  Arthritis  Presents for initial visit  The disease course has been worsening  She complains of pain and stiffness  Affected locations include the right MCP and left foot  Pertinent negatives include no diarrhea, dysuria, fatigue or fever  Past treatments include nothing  Anxiety  Presents for follow-up visit  Patient reports no chest pain, dizziness, nausea, palpitations, shortness of breath or suicidal ideas  Symptoms occur rarely  The severity of symptoms is mild  The patient sleeps 8 hours per night  The quality of sleep is good  Compliance with medications is %         The following portions of the patient's history were reviewed and updated as appropriate: allergies, current medications, past family history, past medical history, past social history, past surgical history and problem list     Review of Systems   Constitutional: Negative for activity change, appetite change, chills, diaphoresis, fatigue and fever  HENT: Negative for congestion, postnasal drip, rhinorrhea, sinus pressure, sinus pain, sneezing and sore throat  Eyes: Negative for visual disturbance  Respiratory: Negative for apnea, cough, choking, chest tightness, shortness of breath and wheezing  Cardiovascular: Negative for chest pain, palpitations and leg swelling  Gastrointestinal: Negative for abdominal distention, abdominal pain, anal bleeding, blood in stool, constipation, diarrhea, nausea and vomiting  Endocrine: Negative for cold intolerance and heat intolerance  Genitourinary: Negative for difficulty urinating, dysuria and hematuria  Musculoskeletal: Positive for arthritis and stiffness  Skin: Negative  Neurological: Negative for dizziness, weakness, light-headedness, numbness and headaches  Hematological: Negative for adenopathy  Psychiatric/Behavioral: Negative for agitation, sleep disturbance and suicidal ideas  All other systems reviewed and are negative          Past Medical History:   Diagnosis Date    Benign paroxysmal positional vertigo due to bilateral vestibular disorder 1/28/2020    Breast cancer ([facility] [address] ) 2007    Bilateral mastectomy with chemotherapy    Chronic maxillary sinusitis 7/20/2020    Gastroesophageal reflux disease without esophagitis 1/28/2020    Hypertension     Hypertensive disorder 5/2/2019    Pancreatitis     PONV (postoperative nausea and vomiting)     Popliteal cyst, left 6/30/2020    PUD (peptic ulcer disease) 1/28/2020    Scoliosis     Skin cancer          Current Outpatient Medications:     acetaminophen (TYLENOL) 500 mg tablet, Take 1 tablet (500 mg total) by mouth every 6 (six) hours as needed for mild pain, Disp: 30 tablet, Rfl: 0    benazepril (LOTENSIN) 40 MG tablet, Take 1 tablet (40 mg total) by mouth daily, Disp: 90 tablet, Rfl: 1    Calcium Carb-Cholecalciferol (CALCIUM 600 + D PO), Take 1 tablet by mouth daily, Disp: , Rfl:     citalopram (CeleXA) 20 mg tablet, Take 1 tablet (20 mg total) by mouth daily, Disp: 90 tablet, Rfl: 0    Coenzyme Q10 (CO Q-10) 200 MG CAPS, Take 1 tablet by mouth daily, Disp: , Rfl:     latanoprost (XALATAN) 0 005 % ophthalmic solution, Administer 1 drop to both eyes daily, Disp: , Rfl:     MELATONIN PO, Take by mouth, Disp: , Rfl:     metoprolol succinate (TOPROL-XL) 50 mg 24 hr tablet, Take 1 tablet (50 mg total) by mouth daily at bedtime, Disp: 90 tablet, Rfl: 1    Multiple Vitamins-Minerals (PRESERVISION/LUTEIN PO), PreserVision Lutein, Disp: , Rfl:     pancrelipase, Lip-Prot-Amyl, (CREON) 12,000 units capsule, Take 12,000 units of lipase by mouth 3 (three) times a day with meals, Disp: 90 capsule, Rfl: 5    spironolactone (ALDACTONE) 25 mg tablet, TAKE 1 TABLET(25 MG) BY MOUTH DAILY, Disp: 90 tablet, Rfl: 0    atorvastatin (LIPITOR) 10 mg tablet, Take 0 5 tablets (5 mg total) by mouth daily, Disp: 45 tablet, Rfl: 0    Allergies   Allergen Reactions    Dorzolamide Hcl-Timolol Mal Other (See Comments)     Tongue burning    Doxazosin Shortness Of Breath    Gabapentin Other (See Comments)    Lexapro [Escitalopram] Tremor    Lorazepam Hallucinations    Zoloft [Sertraline] Other (See Comments)     Diaphoresis and weight gain      Amlodipine Itching     Excessive wt gain    Amoxicillin-Pot Clavulanate Diarrhea    Buprenorphine Hcl Hallucinations    Carbidopa      Severe muscle spasms    Celecoxib Diarrhea    Chlorthalidone Edema     Itchiness, facial swelling, rash    Clindamycin Diarrhea    Clonidine Other (See Comments)     Burning mouth/tongue    Denosumab     Doxycycline Diarrhea and Nausea Only    Fosamax [Alendronate]      Severe stomach pain    Hydrocodone Hallucinations    Hydrocodone-Acetaminophen Hallucinations    Ibandronic Acid      Acid reflux    Levodopa      svere muscle spasms    Milnacipran      Extreme cold feeling    Pregabalin      sedation    Proline      Arthralgia myalgia    Ropinirole Headache    Rosuvastatin Itching    Tramadol Nausea Only     Severe stomach pain    Zenpep [Pancrelipase (Lip-Prot-Amyl)] Diarrhea     svere    Cefdinir Diarrhea and Rash    Rotigotine Rash       Social History   Past Surgical History:   Procedure Laterality Date    BASAL CELL CARCINOMA EXCISION      nose    BREAST BIOPSY      BREAST IMPLANT Bilateral     BREAST SURGERY      double mastectomy    CARPAL TUNNEL RELEASE      CATARACT EXTRACTION      DILATION AND CURETTAGE OF UTERUS      GANGLION CYST EXCISION Left     wrist    HERNIA REPAIR      HIP SURGERY      HYSTERECTOMY      KNEE ARTHROSCOPY      KNEE SURGERY      MASTECTOMY Bilateral 2007    NASAL ENDOSCOPY W/ BALLON SINUPLASTY      x4     PLANTAR FASCIA SURGERY Bilateral 1990    ID NASAL/SINUS ENDOSCOPY,RMV TISS MAXILL SINUS Right 7/27/2020    Procedure: FUNCTIONAL ENDOSCOPIC SINUS SURGERY (FESS) IMAGED GUIDED, MAXILLARY ANTROSTOM Y  ;SEPTOPLASTY;  Surgeon: Pepe Mayer MD;  [facility];  Service: ENT   [facility]      ROTATOR CUFF REPAIR Bilateral 2004 2005    TOTAL ABDOMINAL HYSTERECTOMY      w RSO    TRIGGER FINGER RELEASE      TUBAL LIGATION       Family History   Problem Relation Age of Onset    No Known Problems Mother     No Known Problems Father        Objective:  /82 (BP Location: Left arm, Patient Position: Sitting, Cuff Size: Adult)   Pulse 65   Temp 98 4 °F (36 9 °C)   Ht 4' 10" (1 473 m)   Wt 61 7 kg (136 lb)   SpO2 98%   BMI 28 42 kg/m²     Recent Results (from the past 1344 hour(s))   Basic metabolic panel    Collection Time: 12/29/20 10:46 AM   Result Value Ref Range    Sodium 140 136 - 145 mmol/L    Potassium 5 6 (H) 3 5 - 5 3 mmol/L    Chloride 112 (H) 100 - 108 mmol/L    CO2 26 21 - 32 mmol/L    ANION GAP 2 (L) 4 - 13 mmol/L    BUN 25 5 - 25 mg/dL    Creatinine 1 17 0 60 - 1 30 mg/dL    Glucose, Fasting 91 65 - 99 mg/dL    Calcium 9 8 8 3 - 10 1 mg/dL    eGFR 45 ml/min/1 73sq m            Physical Exam  Vitals signs and nursing note reviewed  Constitutional:       General: She is not in acute distress  Appearance: She is well-developed  She is not diaphoretic  HENT:      Head: Normocephalic and atraumatic  Eyes:      General:         Right eye: No discharge  Left eye: No discharge  Conjunctiva/sclera: Conjunctivae normal       Pupils: Pupils are equal, round, and reactive to light  Neck:      Musculoskeletal: Normal range of motion and neck supple  Thyroid: No thyromegaly  Vascular: No JVD  Cardiovascular:      Rate and Rhythm: Normal rate and regular rhythm  Heart sounds: Normal heart sounds  No murmur  No friction rub  No gallop  Pulmonary:      Effort: Pulmonary effort is normal  No respiratory distress  Breath sounds: Normal breath sounds  No wheezing or rales  Chest:      Chest wall: No tenderness  Abdominal:      General: There is no distension  Palpations: Abdomen is soft  Tenderness: There is no abdominal tenderness  Musculoskeletal: Normal range of motion  General: No tenderness or deformity  Hands:         Feet:    Lymphadenopathy:      Cervical: No cervical adenopathy  Skin:     General: Skin is warm and dry  Coloration: Skin is not pale  Findings: No erythema or rash  Neurological:      Mental Status: She is alert and oriented to person, place, and time  Cranial Nerves: No cranial nerve deficit  Coordination: Coordination normal    Psychiatric:         Behavior: Behavior normal          Thought Content:  Thought content normal          Judgment: Judgment normal

## 2021-01-04 NOTE — ASSESSMENT & PLAN NOTE
Continue statin therapy however due to insurance coverage, will switch from pravastatin to atorvastatin 5 mg daily

## 2021-01-04 NOTE — ASSESSMENT & PLAN NOTE
She is requesting referral to Podiatry for further evaluation  Discussed taking Tylenol as needed for pain as well as range of motion and stretching exercises

## 2021-01-04 NOTE — ASSESSMENT & PLAN NOTE
Controlled  Continue current regimen:  Spironolactone 25 mg daily, metoprolol succinate 50 mg daily, and benazepril 40 mg daily  Will recheck BMP  If potassium remains elevated, will decrease spironolactone dose

## 2021-01-06 ENCOUNTER — OFFICE VISIT (OUTPATIENT)
Dept: PAIN MEDICINE | Facility: MEDICAL CENTER | Age: 76
End: 2021-01-06
Payer: MEDICARE

## 2021-01-06 VITALS
DIASTOLIC BLOOD PRESSURE: 71 MMHG | HEART RATE: 76 BPM | TEMPERATURE: 98.2 F | BODY MASS INDEX: 28.34 KG/M2 | WEIGHT: 135 LBS | HEIGHT: 58 IN | RESPIRATION RATE: 16 BRPM | SYSTOLIC BLOOD PRESSURE: 157 MMHG

## 2021-01-06 DIAGNOSIS — M47.816 LUMBAR SPONDYLOSIS: Primary | ICD-10-CM

## 2021-01-06 DIAGNOSIS — M54.2 NECK PAIN: ICD-10-CM

## 2021-01-06 DIAGNOSIS — M47.812 CERVICAL SPONDYLOSIS: ICD-10-CM

## 2021-01-06 PROCEDURE — 99214 OFFICE O/P EST MOD 30 MIN: CPT | Performed by: NURSE PRACTITIONER

## 2021-01-06 NOTE — PROGRESS NOTES
Assessment  1  Lumbar spondylosis    2  Neck pain    3  Cervical spondylosis        Plan  Patient is very happy with the results she has received from her bilateral L3-5 radiofrequency ablation and does not desire any further pain relief in this area  We will schedule the patient for a bilateral C4-6 facet medial branch blocks with intention of moving forward towards radiofrequency ablation if there is an appropriate diagnostic response  The initial blocks will be performed with 2% lidocaine and if an appropriate response is obtained upon review of the patient's pain diary, a confirmatory block will be scheduled with 0 5% bupivacaine  Patient does have a history of ACDF from C5-7  Will follow-up with patient after her procedure        Complete risks and benefits including bleeding, infection, tissue reaction, nerve injury and allergic reaction were discussed  The approach was demonstrated using models and literature was provided  Verbal and written consent was obtained  My impressions and treatment recommendations were discussed in detail with the patient who verbalized understanding and had no further questions  Discharge instructions were provided  I personally saw and examined the patient and I agree with the above discussed plan of care  Orders Placed This Encounter   Procedures    FL spine and pain procedure     Standing Status:   Future     Standing Expiration Date:   1/6/2025     Order Specific Question:   Reason for Exam:     Answer:   B/L C4-6 MBB#1 start with right side     Order Specific Question:   Anticoagulant hold needed? Answer:   none  takes 81mg ASA     No orders of the defined types were placed in this encounter  History of Present Illness    Betsey Cheung is a 66 y o  female presents for follow-up related to her chronic low back and neck pain  Today the patient reports her low back is doing better and is experiencing no pain in that area    She is status post bilateral L3-5 radiofrequency ablations with the left side completed on November 24, 2020 in the right side on December 8, 2020  Both procedures were performed by Dr Kamryn Bradshaw the patient reports that she has gotten almost 100% relief which is ongoing  Patient's biggest complaint today is her chronic neck pain  We did do an x-ray of her cervical spine she does have cervical spine facet joint osteoarthritis present at C3, C4,C5  Patient does have a history of an ACDF at C5-7  I have personally reviewed and/or updated the patient's past medical history, past surgical history, family history, social history, current medications, allergies, and vital signs today  Review of Systems   Respiratory: Negative for shortness of breath  Cardiovascular: Negative for chest pain  Gastrointestinal: Negative for constipation, diarrhea, nausea and vomiting  Musculoskeletal: Positive for myalgias, neck pain (radiating to the shoulders) and neck stiffness  Negative for arthralgias, gait problem and joint swelling  Skin: Negative for rash  Neurological: Negative for dizziness, seizures and weakness  All other systems reviewed and are negative        Patient Active Problem List   Diagnosis    Fibromyalgia    Glaucoma    Hyperlipidemia    Essential hypertension    Restless legs    Rheumatoid arthritis ([address] )    Pancreatic lesion    Anxiety    Dysthymia    Osteoarthritis of knee    Osteoarthritis of hip    Osteoporosis    Lumbosacral spondylosis without myelopathy    Macular degeneration    Chronic renal insufficiency, stage III (moderate)    Stenosis of right carotid artery    Family history of pancreatic cancer    History of right knee joint replacement    Chronic instability of right knee    Scoliosis, or kyphoscoliosis, idiopathic    Greater trochanteric bursitis of right hip    History of hip replacement, total, right    Chronic left-sided low back pain without sciatica    Lumbar spondylosis    Hyperkalemia    Trigger ring finger of right hand    Arthritis of left foot    Cervical spondylosis    Neck pain       Past Medical History:   Diagnosis Date    Benign paroxysmal positional vertigo due to bilateral vestibular disorder 1/28/2020    Breast cancer ([address] ) 2007    Bilateral mastectomy with chemotherapy    Chronic maxillary sinusitis 7/20/2020    Gastroesophageal reflux disease without esophagitis 1/28/2020    Hypertension     Hypertensive disorder 5/2/2019    Pancreatitis     PONV (postoperative nausea and vomiting)     Popliteal cyst, left 6/30/2020    PUD (peptic ulcer disease) 1/28/2020    Scoliosis     Skin cancer        Past Surgical History:   Procedure Laterality Date    BASAL CELL CARCINOMA EXCISION      nose    BREAST BIOPSY      BREAST IMPLANT Bilateral     BREAST SURGERY      double mastectomy    CARPAL TUNNEL RELEASE      CATARACT EXTRACTION      DILATION AND CURETTAGE OF UTERUS      GANGLION CYST EXCISION Left     wrist    HERNIA REPAIR      HIP SURGERY      HYSTERECTOMY      KNEE ARTHROSCOPY      KNEE SURGERY      MASTECTOMY Bilateral 2007    NASAL ENDOSCOPY W/ BALLON SINUPLASTY      x4     PLANTAR FASCIA SURGERY Bilateral 1990    [place] NASAL/SINUS ENDOSCOPY,RMV TISS MAXILL SINUS Right 7/27/2020    Procedure: FUNCTIONAL ENDOSCOPIC SINUS SURGERY (FESS) IMAGED GUIDED, MAXILLARY ANTROSTOM Y  ;SEPTOPLASTY;  Surgeon: Rena Plata MD;  [facility];  Service: ENT   [address] [place] [address] Bilateral 2004 2005    TOTAL ABDOMINAL HYSTERECTOMY      w RSO    TRIGGER FINGER RELEASE      TUBAL LIGATION         Family History   Problem Relation Age of Onset    No Known Problems Mother     No Known Problems Father     No Known Problems Maternal Grandmother     No Known Problems Maternal Grandfather     No Known Problems Paternal Grandmother     No Known Problems Paternal Grandfather Social History     Occupational History    Not on file   Tobacco Use    Smoking status: Former Smoker     Quit date:      Years since quittin 0    Smokeless tobacco: Never Used   Substance and Sexual Activity    Alcohol use: Not Currently    Drug use: Never    Sexual activity: Not Currently       Current Outpatient Medications on File Prior to Visit   Medication Sig    acetaminophen (TYLENOL) 500 mg tablet Take 1 tablet (500 mg total) by mouth every 6 (six) hours as needed for mild pain    atorvastatin (LIPITOR) 10 mg tablet Take 0 5 tablets (5 mg total) by mouth daily    benazepril (LOTENSIN) 40 MG tablet Take 1 tablet (40 mg total) by mouth daily    Calcium Carb-Cholecalciferol (CALCIUM 600 + D PO) Take 1 tablet by mouth daily    citalopram (CeleXA) 20 mg tablet Take 1 tablet (20 mg total) by mouth daily    Coenzyme Q10 (CO Q-10) 200 MG CAPS Take 1 tablet by mouth daily    latanoprost (XALATAN) 0 005 % ophthalmic solution Administer 1 drop to both eyes daily    MELATONIN PO Take by mouth    metoprolol succinate (TOPROL-XL) 50 mg 24 hr tablet Take 1 tablet (50 mg total) by mouth daily at bedtime    Multiple Vitamins-Minerals (PRESERVISION/LUTEIN PO) PreserVision Lutein    pancrelipase, Lip-Prot-Amyl, (CREON) 12,000 units capsule Take 12,000 units of lipase by mouth 3 (three) times a day with meals    spironolactone (ALDACTONE) 25 mg tablet TAKE 1 TABLET(25 MG) BY MOUTH DAILY     No current facility-administered medications on file prior to visit          Allergies   Allergen Reactions    Dorzolamide Hcl-Timolol Mal Other (See Comments)     Tongue burning    Doxazosin Shortness Of Breath    Gabapentin Other (See Comments)    Lexapro [Escitalopram] Tremor    Lorazepam Hallucinations    Zoloft [Sertraline] Other (See Comments)     Diaphoresis and weight gain      Amlodipine Itching     Excessive wt gain    Amoxicillin-Pot Clavulanate Diarrhea    Buprenorphine Hcl Hallucinations  Carbidopa      Severe muscle spasms    Celecoxib Diarrhea    Chlorthalidone Edema     Itchiness, facial swelling, rash    Clindamycin Diarrhea    Clonidine Other (See Comments)     Burning mouth/tongue    Denosumab     Doxycycline Diarrhea and Nausea Only    Fosamax [Alendronate]      Severe stomach pain    Hydrocodone Hallucinations    Hydrocodone-Acetaminophen Hallucinations    Ibandronic Acid      Acid reflux    Levodopa      svere muscle spasms    Milnacipran      Extreme cold feeling    Pregabalin      sedation    Proline      Arthralgia myalgia    Ropinirole Headache    Rosuvastatin Itching    Tramadol Nausea Only     Severe stomach pain    Zenpep [Pancrelipase (Lip-Prot-Amyl)] Diarrhea     svere    Cefdinir Diarrhea and Rash    Rotigotine Rash       Physical Exam    /71   Pulse 76   Temp 98 2 °F (36 8 °C)   Resp 16   Ht 4' 10" (1 473 m)   Wt 61 2 kg (135 lb)   BMI 28 22 kg/m²     Constitutional: normal, well developed, well nourished, alert, in no distress and non-toxic and no overt pain behavior    Eyes: anicteric  HEENT: grossly intact  Neck: supple, symmetric, trachea midline and no masses   Pulmonary:even and unlabored  Cardiovascular:No edema or pitting edema present  Skin:Normal without rashes or lesions and well hydrated  Psychiatric:Mood and affect appropriate  Neurologic:Cranial Nerves II-XII grossly intact  Musculoskeletal:normal    Imaging

## 2021-01-11 DIAGNOSIS — I10 ESSENTIAL HYPERTENSION: ICD-10-CM

## 2021-01-11 RX ORDER — BENAZEPRIL HYDROCHLORIDE 40 MG/1
TABLET, FILM COATED ORAL
Qty: 90 TABLET | Refills: 1 | Status: SHIPPED | OUTPATIENT
Start: 2021-01-11 | End: 2021-07-07

## 2021-01-12 RX ORDER — BENAZEPRIL HYDROCHLORIDE 40 MG/1
40 TABLET, FILM COATED ORAL DAILY
Qty: 90 TABLET | Refills: 1 | Status: CANCELLED | OUTPATIENT
Start: 2021-01-12

## 2021-01-12 RX ORDER — SPIRONOLACTONE 25 MG/1
TABLET ORAL
Qty: 90 TABLET | Refills: 0 | OUTPATIENT
Start: 2021-01-12

## 2021-01-12 RX ORDER — BENAZEPRIL HYDROCHLORIDE 40 MG/1
40 TABLET, FILM COATED ORAL DAILY
Qty: 90 TABLET | Refills: 1 | OUTPATIENT
Start: 2021-01-12

## 2021-01-12 RX ORDER — METOPROLOL SUCCINATE 50 MG/1
50 TABLET, EXTENDED RELEASE ORAL
Qty: 90 TABLET | Refills: 1 | OUTPATIENT
Start: 2021-01-12

## 2021-01-12 RX ORDER — METOPROLOL SUCCINATE 50 MG/1
50 TABLET, EXTENDED RELEASE ORAL
Qty: 90 TABLET | Refills: 1 | Status: SHIPPED | OUTPATIENT
Start: 2021-01-12 | End: 2021-08-16 | Stop reason: SDUPTHER

## 2021-01-13 ENCOUNTER — HOSPITAL ENCOUNTER (OUTPATIENT)
Dept: RADIOLOGY | Facility: IMAGING CENTER | Age: 76
Discharge: HOME | End: 2021-01-13
Payer: MEDICARE

## 2021-01-13 ENCOUNTER — TRANSCRIBE ORDERS (OUTPATIENT)
Dept: ADMINISTRATIVE | Facility: HOSPITAL | Age: 76
End: 2021-01-13

## 2021-01-13 DIAGNOSIS — M79.672 LEFT FOOT PAIN: Primary | ICD-10-CM

## 2021-01-13 DIAGNOSIS — M79.672 LEFT FOOT PAIN: ICD-10-CM

## 2021-01-13 PROCEDURE — 73630 X-RAY EXAM OF FOOT: CPT

## 2021-01-14 DIAGNOSIS — I10 ESSENTIAL HYPERTENSION: ICD-10-CM

## 2021-01-14 RX ORDER — SPIRONOLACTONE 25 MG/1
25 TABLET ORAL DAILY
Qty: 90 TABLET | Refills: 0 | Status: SHIPPED | OUTPATIENT
Start: 2021-01-14 | End: 2021-04-13 | Stop reason: SDUPTHER

## 2021-01-15 ENCOUNTER — TRANSCRIBE ORDERS (OUTPATIENT)
Dept: ADMINISTRATIVE | Facility: HOSPITAL | Age: 76
End: 2021-01-15

## 2021-01-15 DIAGNOSIS — M84.372A STRESS FRACTURE, LEFT ANKLE, INITIAL ENCOUNTER FOR FRACTURE: Primary | ICD-10-CM

## 2021-01-18 ENCOUNTER — HOSPITAL ENCOUNTER (OUTPATIENT)
Dept: RADIOLOGY | Facility: IMAGING CENTER | Age: 76
Discharge: HOME | End: 2021-01-18
Payer: MEDICARE

## 2021-01-18 ENCOUNTER — APPOINTMENT (OUTPATIENT)
Dept: LAB | Facility: IMAGING CENTER | Age: 76
End: 2021-01-18
Payer: MEDICARE

## 2021-01-18 DIAGNOSIS — E87.5 HYPERKALEMIA: ICD-10-CM

## 2021-01-18 DIAGNOSIS — M84.372A STRESS FRACTURE, LEFT ANKLE, INITIAL ENCOUNTER FOR FRACTURE: ICD-10-CM

## 2021-01-18 LAB
ANION GAP SERPL CALCULATED.3IONS-SCNC: 4 MMOL/L (ref 4–13)
BUN SERPL-MCNC: 25 MG/DL (ref 5–25)
CALCIUM SERPL-MCNC: 9.9 MG/DL (ref 8.3–10.1)
CHLORIDE SERPL-SCNC: 110 MMOL/L (ref 100–108)
CO2 SERPL-SCNC: 26 MMOL/L (ref 21–32)
CREAT SERPL-MCNC: 1.24 MG/DL (ref 0.6–1.3)
GFR SERPL CREATININE-BSD FRML MDRD: 42 ML/MIN/1.73SQ M
GLUCOSE SERPL-MCNC: 91 MG/DL (ref 65–140)
POTASSIUM SERPL-SCNC: 5.2 MMOL/L (ref 3.5–5.3)
SODIUM SERPL-SCNC: 140 MMOL/L (ref 136–145)

## 2021-01-18 PROCEDURE — 36415 COLL VENOUS BLD VENIPUNCTURE: CPT

## 2021-01-18 PROCEDURE — 80048 BASIC METABOLIC PNL TOTAL CA: CPT

## 2021-01-18 PROCEDURE — 73718 MRI LOWER EXTREMITY W/O DYE: CPT

## 2021-01-18 PROCEDURE — G1004 CDSM NDSC: HCPCS

## 2021-01-20 ENCOUNTER — TELEPHONE (OUTPATIENT)
Dept: INTERNAL MEDICINE CLINIC | Facility: CLINIC | Age: 76
End: 2021-01-20

## 2021-01-27 ENCOUNTER — TELEPHONE (OUTPATIENT)
Dept: PAIN MEDICINE | Facility: MEDICAL CENTER | Age: 76
End: 2021-01-27

## 2021-01-27 ENCOUNTER — IMMUNIZATIONS (OUTPATIENT)
Dept: FAMILY MEDICINE CLINIC | Facility: HOSPITAL | Age: 76
End: 2021-01-27

## 2021-01-27 DIAGNOSIS — Z23 ENCOUNTER FOR IMMUNIZATION: Primary | ICD-10-CM

## 2021-01-27 PROCEDURE — 91300 SARS-COV-2 / COVID-19 MRNA VACCINE (PFIZER-BIONTECH) 30 MCG: CPT

## 2021-01-27 PROCEDURE — 0001A SARS-COV-2 / COVID-19 MRNA VACCINE (PFIZER-BIONTECH) 30 MCG: CPT

## 2021-01-27 NOTE — TELEPHONE ENCOUNTER
After speaking with patient and rescheduling her appt, she is for MBB, not a steroid injection  She can remain on 2/2  Left her message that she can keep 2/2 as scheduled and requested call back to confirm this

## 2021-01-27 NOTE — TELEPHONE ENCOUNTER
Called and spoke with patient, she understood information as per below  and is appreciative of call

## 2021-01-27 NOTE — TELEPHONE ENCOUNTER
Pt called stating she has a procedure scheduled for 2/2/21 and got a covid shot today on 1/27/21 and is due for the next one on 2/16/21  Please call pt as soon as possible she may not be able to get her injection      Pt # [phone]

## 2021-02-05 ENCOUNTER — OFFICE VISIT (OUTPATIENT)
Dept: OBGYN CLINIC | Facility: HOSPITAL | Age: 76
End: 2021-02-05
Payer: MEDICARE

## 2021-02-05 VITALS
SYSTOLIC BLOOD PRESSURE: 155 MMHG | BODY MASS INDEX: 28.55 KG/M2 | HEART RATE: 65 BPM | HEIGHT: 58 IN | WEIGHT: 136 LBS | DIASTOLIC BLOOD PRESSURE: 76 MMHG

## 2021-02-05 DIAGNOSIS — M65.341 TRIGGER RING FINGER OF RIGHT HAND: ICD-10-CM

## 2021-02-05 DIAGNOSIS — M84.372A STRESS FRACTURE, LEFT ANKLE, INITIAL ENCOUNTER FOR FRACTURE: Primary | ICD-10-CM

## 2021-02-05 PROCEDURE — 99213 OFFICE O/P EST LOW 20 MIN: CPT | Performed by: ORTHOPAEDIC SURGERY

## 2021-02-05 PROCEDURE — 20550 NJX 1 TENDON SHEATH/LIGAMENT: CPT | Performed by: ORTHOPAEDIC SURGERY

## 2021-02-05 RX ORDER — LIDOCAINE HYDROCHLORIDE 10 MG/ML
1 INJECTION, SOLUTION INFILTRATION; PERINEURAL
Status: COMPLETED | OUTPATIENT
Start: 2021-02-05 | End: 2021-02-05

## 2021-02-05 RX ORDER — PRAVASTATIN SODIUM 10 MG
10 TABLET ORAL DAILY
COMMUNITY
End: 2021-05-14

## 2021-02-05 RX ORDER — BETAMETHASONE SODIUM PHOSPHATE AND BETAMETHASONE ACETATE 3; 3 MG/ML; MG/ML
6 INJECTION, SUSPENSION INTRA-ARTICULAR; INTRALESIONAL; INTRAMUSCULAR; SOFT TISSUE
Status: COMPLETED | OUTPATIENT
Start: 2021-02-05 | End: 2021-02-05

## 2021-02-05 RX ORDER — LANOLIN ALCOHOL/MO/W.PET/CERES
CREAM (GRAM) TOPICAL DAILY
COMMUNITY

## 2021-02-05 RX ORDER — ASPIRIN 81 MG/1
81 TABLET ORAL DAILY
COMMUNITY

## 2021-02-05 RX ADMIN — LIDOCAINE HYDROCHLORIDE 1 ML: 10 INJECTION, SOLUTION INFILTRATION; PERINEURAL at 12:28

## 2021-02-05 RX ADMIN — BETAMETHASONE SODIUM PHOSPHATE AND BETAMETHASONE ACETATE 6 MG: 3; 3 INJECTION, SUSPENSION INTRA-ARTICULAR; INTRALESIONAL; INTRAMUSCULAR; SOFT TISSUE at 12:28

## 2021-02-05 NOTE — PROGRESS NOTES
ASSESSMENT/PLAN:    Assessment:   Trigger Finger  right  ring finger    Plan:   steroid injections  - provided in office today    Follow Up:  6  week(s)    To Do Next Visit:  Assess response to treatment    General Discussions:  Trigger FInger: The anatomy and physiology of trigger finger was discussed with the patient today in the office  Edema and increased contact pressure within the flexor tendons at the A1 pulley can cause pain, crepitation, and limitation of function  Treatment options include resting MP blocking splints to decrease edema, oral anti-inflammatory medications, home or formal therapy exercises, up to 2 steroid injections within the tendon sheath, or surgical release  While majority of patients do respond to conservative treatment, up to 20% may require surgical release        _____________________________________________________  CHIEF COMPLAINT:  Chief Complaint   Patient presents with    Right Ring Finger - Pain, Locking         SUBJECTIVE:  Philip Beltran is a 66 y o  female who presents with pain and popping in the right ring finger  Patient has a history of multiple trigger fingers (3), which will required operative intervention  She also has a history of bilateral thumb arthroplasty with tendon transfer and carpal tunnel syndrome treated operatively  Patient states that this started a couple weeks ago  She states that is bothersome with her regular activities  She denies any injuries  She denies any numbness or tingling  She denies any intervention for this finger in the past   She denies any other acute complaints      PAST MEDICAL HISTORY:  Past Medical History:   Diagnosis Date    Benign paroxysmal positional vertigo due to bilateral vestibular disorder 1/28/2020    Breast cancer [facility]) 2007    Bilateral mastectomy with chemotherapy    Chronic maxillary sinusitis 7/20/2020    Gastroesophageal reflux disease without esophagitis 1/28/2020    Hypertension     Hypertensive disorder 2019    Pancreatitis     PONV (postoperative nausea and vomiting)     Popliteal cyst, left 2020    PUD (peptic ulcer disease) 2020    Scoliosis     Skin cancer        PAST SURGICAL HISTORY:  Past Surgical History:   Procedure Laterality Date    BASAL CELL CARCINOMA EXCISION      nose    BREAST BIOPSY      BREAST IMPLANT Bilateral     BREAST SURGERY      double mastectomy    CARPAL TUNNEL RELEASE      CATARACT EXTRACTION      DILATION AND CURETTAGE OF UTERUS      GANGLION CYST EXCISION Left     wrist    HERNIA REPAIR      HIP SURGERY      HYSTERECTOMY      KNEE ARTHROSCOPY      KNEE SURGERY      MASTECTOMY Bilateral     NASAL ENDOSCOPY W/ BALLON SINUPLASTY      x4     PLANTAR FASCIA SURGERY Bilateral     VT NASAL/SINUS ENDOSCOPY,RMV TISS MAXILL SINUS Right 2020    Procedure: FUNCTIONAL ENDOSCOPIC SINUS SURGERY (FESS) IMAGED GUIDED, MAXILLARY ANTROSTOM Y  ;SEPTOPLASTY;  Surgeon: Jorge Lynn MD;  [facility];  Service: ENT   [address]      ROTATOR CUFF REPAIR Bilateral 2004    TOTAL ABDOMINAL HYSTERECTOMY      w RSO    TRIGGER FINGER RELEASE      TUBAL LIGATION         FAMILY HISTORY:  Family History   Problem Relation Age of Onset    No Known Problems Mother     No Known Problems Father     No Known Problems Maternal Grandmother     No Known Problems Maternal Grandfather     No Known Problems Paternal Grandmother     No Known Problems Paternal Grandfather        SOCIAL HISTORY:  Social History     Tobacco Use    Smoking status: Former Smoker     Quit date:      Years since quittin 1    Smokeless tobacco: Never Used   Substance Use Topics    Alcohol use: Not Currently    Drug use: Never       MEDICATIONS:    Current Outpatient Medications:     acetaminophen (TYLENOL) 500 mg tablet, Take 1 tablet (500 mg total) by mouth every 6 (six) hours as needed for mild pain, Disp: 30 tablet, Rfl: 0    aspirin (ECOTRIN LOW STRENGTH) 81 mg EC tablet, Take 81 mg by mouth daily, Disp: , Rfl:     benazepril (LOTENSIN) 40 MG tablet, TAKE 1 TABLET(40 MG) BY MOUTH DAILY, Disp: 90 tablet, Rfl: 1    Calcium Carb-Cholecalciferol (CALCIUM 600 + D PO), Take 1 tablet by mouth daily, Disp: , Rfl:     citalopram (CeleXA) 20 mg tablet, Take 1 tablet (20 mg total) by mouth daily, Disp: 90 tablet, Rfl: 0    Coenzyme Q10 (CO Q-10) 200 MG CAPS, Take 1 tablet by mouth daily, Disp: , Rfl:     latanoprost (XALATAN) 0 005 % ophthalmic solution, Administer 1 drop to both eyes daily, Disp: , Rfl:     MELATONIN PO, Take by mouth, Disp: , Rfl:     metoprolol succinate (TOPROL-XL) 50 mg 24 hr tablet, Take 1 tablet (50 mg total) by mouth daily at bedtime, Disp: 90 tablet, Rfl: 1    Multiple Vitamins-Minerals (PRESERVISION/LUTEIN PO), PreserVision Lutein, Disp: , Rfl:     omeprazole (PriLOSEC) 20 mg delayed release capsule, TAKE 1 CAPSULE(20 MG) BY MOUTH TWICE DAILY BEFORE MEALS, Disp: 180 capsule, Rfl: 3    pancrelipase, Lip-Prot-Amyl, (CREON) 12,000 units capsule, Take 12,000 units of lipase by mouth 3 (three) times a day with meals, Disp: 90 capsule, Rfl: 5    pravastatin (PRAVACHOL) 10 mg tablet, Take 10 mg by mouth daily Take 1/2 tablet oral route once daily, Disp: , Rfl:     spironolactone (ALDACTONE) 25 mg tablet, Take 1 tablet (25 mg total) by mouth daily, Disp: 90 tablet, Rfl: 0    vitamin B-12 (VITAMIN B-12) 1,000 mcg tablet, Take by mouth daily, Disp: , Rfl:     atorvastatin (LIPITOR) 10 mg tablet, Take 0 5 tablets (5 mg total) by mouth daily (Patient not taking: Reported on 2/5/2021), Disp: 45 tablet, Rfl: 0    ALLERGIES:  Allergies   Allergen Reactions    Dorzolamide Hcl-Timolol Mal Other (See Comments)     Tongue burning    Doxazosin Shortness Of Breath    Gabapentin Other (See Comments)    Lexapro [Escitalopram] Tremor    Lorazepam Hallucinations    Zoloft [Sertraline] Other (See Comments) Diaphoresis and weight gain      Amlodipine Itching     Excessive wt gain    Amoxicillin-Pot Clavulanate Diarrhea    Buprenorphine Hcl Hallucinations    Carbidopa      Severe muscle spasms    Celecoxib Diarrhea    Chlorthalidone Edema     Itchiness, facial swelling, rash    Clindamycin Diarrhea    Clonidine Other (See Comments)     Burning mouth/tongue    Denosumab     Doxycycline Diarrhea and Nausea Only    Fosamax [Alendronate]      Severe stomach pain    Hydrocodone Hallucinations    Hydrocodone-Acetaminophen Hallucinations    Ibandronic Acid      Acid reflux    Levodopa      svere muscle spasms    Milnacipran      Extreme cold feeling    Pregabalin      sedation    Proline      Arthralgia myalgia    Ropinirole Headache    Rosuvastatin Itching    Tramadol Nausea Only     Severe stomach pain    Zenpep [Pancrelipase (Lip-Prot-Amyl)] Diarrhea     svere    Cefdinir Diarrhea and Rash    Rotigotine Rash       REVIEW OF SYSTEMS:  Pertinent items are noted in HPI  A comprehensive review of systems was negative      LABS:  HgA1c: No results found for: HGBA1C  BMP:   Lab Results   Component Value Date    CALCIUM 9 9 01/18/2021    K 5 2 01/18/2021    CO2 26 01/18/2021     (H) 01/18/2021    BUN 25 01/18/2021    CREATININE 1 24 01/18/2021         _____________________________________________________  PHYSICAL EXAMINATION:  Vital signs: /76   Pulse 65   Ht 4' 10" (1 473 m)   Wt 61 7 kg (136 lb)   BMI 28 42 kg/m²   General: well developed and well nourished, alert, oriented times 3 and appears comfortable  Psychiatric: Normal  HEENT: Trachea Midline, No torticollis  Cardiovascular: No discernable arrhythmia  Pulmonary: No wheezing or stridor  Skin: No masses, erythema, lacerations, fluctation, ulcerations  Neurovascular: Sensation Intact to the Median, Ulnar, Radial Nerve, Motor Intact to the Median, Ulnar, Radial Nerve and Pulses Intact    MUSCULOSKELETAL EXAMINATION:  RIGHT SIDE: Finger:  Tenderness over A1 puley of ring finger, Triggering  ring finger and Palpable clicking ring finger    _____________________________________________________  STUDIES REVIEWED:  No Studies to review      PROCEDURES PERFORMED:  Hand/upper extremity injection: R ring A1  Universal Protocol:  Consent: Verbal consent obtained  Risks and benefits: risks, benefits and alternatives were discussed  Consent given by: patient  Time out: Immediately prior to procedure a "time out" was called to verify the correct patient, procedure, equipment, support staff and site/side marked as required    Timeout called at: 2/5/2021 12:15 PM   Supporting Documentation  Indications: pain and therapeutic   Procedure Details  Condition:trigger finger Location: ring finger - R ring A1   Preparation: Patient was prepped and draped in the usual sterile fashion  Needle size: 25 G  Ultrasound guidance: no  Medications administered: 1 mL lidocaine 1 %; 6 mg betamethasone acetate-betamethasone sodium phosphate 6 (3-3) mg/mL    Patient tolerance: patient tolerated the procedure well with no immediate complications  Dressing:  Sterile dressing applied              Scribe Attestation    I,:  Sergo Myers PA-C am acting as a scribe while in the presence of the attending physician :       I,:  [place] MD personally performed the services described in this documentation    as scribed in my presence :           Chris Ham,:  Sergo Myers PA-C am acting as a scribe while in the presence of the attending physician :       I,:  [place] MD personally performed the services described in this documentation    as scribed in my presence :

## 2021-02-09 ENCOUNTER — OFFICE VISIT (OUTPATIENT)
Dept: INTERNAL MEDICINE CLINIC | Facility: CLINIC | Age: 76
End: 2021-02-09
Payer: MEDICARE

## 2021-02-09 VITALS
HEIGHT: 58 IN | TEMPERATURE: 98.2 F | HEART RATE: 65 BPM | BODY MASS INDEX: 28.34 KG/M2 | WEIGHT: 135 LBS | OXYGEN SATURATION: 99 % | DIASTOLIC BLOOD PRESSURE: 86 MMHG | SYSTOLIC BLOOD PRESSURE: 130 MMHG

## 2021-02-09 DIAGNOSIS — M06.9 RHEUMATOID ARTHRITIS, INVOLVING UNSPECIFIED SITE, UNSPECIFIED WHETHER RHEUMATOID FACTOR PRESENT (HCC): ICD-10-CM

## 2021-02-09 DIAGNOSIS — F41.9 ANXIETY: ICD-10-CM

## 2021-02-09 DIAGNOSIS — M84.372A STRESS FRACTURE, LEFT ANKLE, INITIAL ENCOUNTER FOR FRACTURE: Primary | ICD-10-CM

## 2021-02-09 DIAGNOSIS — F34.1 DYSTHYMIA: ICD-10-CM

## 2021-02-09 DIAGNOSIS — M81.0 AGE-RELATED OSTEOPOROSIS WITHOUT CURRENT PATHOLOGICAL FRACTURE: ICD-10-CM

## 2021-02-09 DIAGNOSIS — E87.5 HYPERKALEMIA: ICD-10-CM

## 2021-02-09 DIAGNOSIS — G47.9 DIFFICULTY SLEEPING: ICD-10-CM

## 2021-02-09 PROBLEM — M54.2 NECK PAIN: Status: RESOLVED | Noted: 2021-01-06 | Resolved: 2021-02-09

## 2021-02-09 PROCEDURE — 99214 OFFICE O/P EST MOD 30 MIN: CPT | Performed by: INTERNAL MEDICINE

## 2021-02-09 RX ORDER — TRAZODONE HYDROCHLORIDE 50 MG/1
50 TABLET ORAL
Qty: 30 TABLET | Refills: 3 | Status: SHIPPED | OUTPATIENT
Start: 2021-02-09 | End: 2021-02-23 | Stop reason: SINTOL

## 2021-02-09 RX ORDER — CITALOPRAM 20 MG/1
20 TABLET ORAL DAILY
Qty: 90 TABLET | Refills: 1 | Status: SHIPPED | OUTPATIENT
Start: 2021-02-09 | End: 2021-07-27 | Stop reason: ALTCHOICE

## 2021-02-09 NOTE — ASSESSMENT & PLAN NOTE
· At least 6 month history of difficulty falling asleep and staying asleep, she has tried melatonin nightly for several months to no avail  · She doses off for 1-2 hours in the middle of the night and again from 6am-7:30am  · She does not feel well rested  ·   Will start trazodone 50 mg at bedtime  Discussed potential adverse effects  · Follow-up in 3 months  risk factors (4) no limitation

## 2021-02-09 NOTE — ASSESSMENT & PLAN NOTE
· Potassium previously been elevated to 5 6 at her last visit, improved to 5 2 today  · She is currently on spironolactone 25 mg daily, benazepril 40 mg daily, and metoprolol 50 mg daily  · Continue to monitor on BMP, can consider cutting out Aldactone if patient remains persistently hyperkalemic

## 2021-02-09 NOTE — ASSESSMENT & PLAN NOTE
· History of anxiety, on Celexa 20 mg daily  · Denies recent anxiety; feels this has been well controlled recently and not contributing to her difficulty sleeping

## 2021-02-09 NOTE — ASSESSMENT & PLAN NOTE
· Joint pain and swelling, hands bilaterally  · Diagnosed with RA in [place], had previously been on medication, but was weaned off over two years ago

## 2021-02-09 NOTE — PROGRESS NOTES
Assessment/Plan:    Difficulty sleeping  · At least 6 month history of difficulty falling asleep and staying asleep, she has tried melatonin nightly for several months to no avail  · She doses off for 1-2 hours in the middle of the night and again from 6am-7:30am  · She does not feel well rested  ·   Will start trazodone 50 mg at bedtime  Discussed potential adverse effects  · Follow-up in 3 months  Anxiety  · History of anxiety, on Celexa 20 mg daily  · Denies recent anxiety; feels this has been well controlled recently and not contributing to her difficulty sleeping    Hyperkalemia  · Potassium previously been elevated to 5 6 at her last visit, improved to 5 2 today  · She is currently on spironolactone 25 mg daily, benazepril 40 mg daily, and metoprolol 50 mg daily  · Continue to monitor on BMP, can consider cutting out Aldactone if patient remains persistently hyperkalemic    Rheumatoid arthritis ([facility] [address] )  · Joint pain and swelling, hands bilaterally  · Diagnosed with RA in [place], had previously been on medication, but was weaned off over two years ago    Osteoporosis    DEXA scan ordered  She states that she is not able to tolerate calcium supplementation as it causes diarrhea  She is on vitamin-D supplementation  Diagnoses and all orders for this visit:    Stress fracture, left ankle, initial encounter for fracture  -     DXA bone density spine hip and pelvis; Future    Anxiety  -     citalopram (CeleXA) 20 mg tablet; Take 1 tablet (20 mg total) by mouth daily    Dysthymia  -     citalopram (CeleXA) 20 mg tablet; Take 1 tablet (20 mg total) by mouth daily    Hyperkalemia    Rheumatoid arthritis, involving unspecified site, unspecified whether rheumatoid factor present [facility])  -     Ambulatory referral to Rheumatology; Future    Difficulty sleeping  -     traZODone (DESYREL) 50 mg tablet;  Take 1 tablet (50 mg total) by mouth daily at bedtime as needed for sleep    Age-related osteoporosis without current pathological fracture   -     DXA bone density spine hip and pelvis; Future    Other orders  -     Diclofenac Sodium (VOLTAREN) 1 %; APPLY 2 GRAMS EXTERNALLY TO THE AFFECTED AREA FOUR TIMES DAILY          Subjective:   Chief Complaint   Patient presents with    Insomnia     Patient having trouble sleeping, melatonin xr 5mg bid not helping   Hand Pain     Discuss rheumatologist referal for pain in her hands        Patient ID: Milton Silver is a 66 y o  female  Patient is a pleasant 47-year-old female who presents today for a six-month history of insomnia  She states she has had difficulty falling asleep and staying asleep during this time  She has history of anxiety, but feels that her anxiety has been well controlled recently  She was started on melatonin which has been unsuccessful  She states that she tends to fall asleep for several hours, falls asleep for about 1-2 hours at around 1 in the morning and wakes up quite awake  She is able to again sleep for another hour or 2 at approximately 6:00 a m , but only has a total of 2-4 hours of sleep nightly  She does not feel his that she is well rested during the day  She has good sleep hygiene, she does not spend time in her bed or bedroom during the day  She does have a television in the bedroom, which she will watch until 11/11:30 when she thinks she will be ready to go to bed  She has been less active over the last year since she moved here from [place], and is unable to exercise as much  She has had 15-20 lb weight gain during this time  The following portions of the patient's history were reviewed and updated as appropriate: allergies, current medications, past family history, past medical history, past social history, past surgical history and problem list     Review of Systems   Constitutional: Positive for unexpected weight change  Negative for chills and fever          15-20 lb weight gain over the last year since she has moved from [place] and has been unable to exercise as much   HENT: Negative for rhinorrhea, sinus pressure, sinus pain and sore throat  Eyes: Negative for photophobia, pain and visual disturbance  Respiratory: Negative for cough, shortness of breath and wheezing  Cardiovascular: Negative for chest pain, palpitations and leg swelling  Gastrointestinal: Negative for abdominal pain, constipation, diarrhea, nausea and vomiting  Genitourinary: Negative for dysuria, frequency, hematuria and urgency  Musculoskeletal: Positive for arthralgias, joint swelling and myalgias  Negative for back pain  Skin: Negative for color change and rash  Neurological: Negative for syncope, weakness, light-headedness, numbness and headaches  Psychiatric/Behavioral: Positive for sleep disturbance  Negative for agitation and behavioral problems  The patient is nervous/anxious  All other systems reviewed and are negative  Objective:  BMI Counseling: Body mass index is 28 22 kg/m²  The BMI is above normal  Nutrition recommendations include reducing portion sizes, 3-5 servings of fruits/vegetables daily, decreasing soda and/or juice intake, reducing intake of saturated fat and trans fat and reducing intake of cholesterol  Exercise recommendations include moderate aerobic physical activity for 150 minutes/week and exercising 3-5 times per week  /86 (BP Location: Left arm, Patient Position: Sitting, Cuff Size: Adult)   Pulse 65   Temp 98 2 °F (36 8 °C)   Ht 4' 10" (1 473 m)   Wt 61 2 kg (135 lb)   SpO2 99%   BMI 28 22 kg/m²          Physical Exam  Vitals signs and nursing note reviewed  Constitutional:       General: She is not in acute distress  Appearance: She is well-developed  She is not diaphoretic  HENT:      Head: Normocephalic and atraumatic  Eyes:      General:         Right eye: No discharge  Left eye: No discharge        Conjunctiva/sclera: Conjunctivae normal       Pupils: Pupils are equal, round, and reactive to light  Neck:      Musculoskeletal: Normal range of motion and neck supple  Thyroid: No thyromegaly  Vascular: No JVD  Cardiovascular:      Rate and Rhythm: Normal rate and regular rhythm  Heart sounds: Normal heart sounds  No murmur  No friction rub  No gallop  Pulmonary:      Effort: Pulmonary effort is normal  No respiratory distress  Breath sounds: Normal breath sounds  No wheezing or rales  Chest:      Chest wall: No tenderness  Abdominal:      General: There is no distension  Palpations: Abdomen is soft  Tenderness: There is no abdominal tenderness  Musculoskeletal: Normal range of motion  General: No tenderness or deformity  Lymphadenopathy:      Cervical: No cervical adenopathy  Skin:     General: Skin is warm and dry  Coloration: Skin is not pale  Findings: No erythema or rash  Neurological:      Mental Status: She is alert and oriented to person, place, and time  Cranial Nerves: No cranial nerve deficit  Coordination: Coordination normal    Psychiatric:         Behavior: Behavior normal          Thought Content:  Thought content normal          Judgment: Judgment normal

## 2021-02-09 NOTE — ASSESSMENT & PLAN NOTE
DEXA scan ordered  She states that she is not able to tolerate calcium supplementation as it causes diarrhea  She is on vitamin-D supplementation

## 2021-02-17 ENCOUNTER — HOSPITAL ENCOUNTER (OUTPATIENT)
Dept: RADIOLOGY | Facility: MEDICAL CENTER | Age: 76
Discharge: HOME | End: 2021-02-17
Attending: PHYSICAL MEDICINE & REHABILITATION | Admitting: PHYSICAL MEDICINE & REHABILITATION
Payer: MEDICARE

## 2021-02-17 ENCOUNTER — IMMUNIZATIONS (OUTPATIENT)
Dept: FAMILY MEDICINE CLINIC | Facility: HOSPITAL | Age: 76
End: 2021-02-17
Payer: MEDICARE

## 2021-02-17 VITALS
SYSTOLIC BLOOD PRESSURE: 161 MMHG | HEART RATE: 64 BPM | TEMPERATURE: 98.9 F | RESPIRATION RATE: 20 BRPM | DIASTOLIC BLOOD PRESSURE: 77 MMHG | OXYGEN SATURATION: 96 %

## 2021-02-17 DIAGNOSIS — M47.812 CERVICAL SPONDYLOSIS: ICD-10-CM

## 2021-02-17 DIAGNOSIS — M54.2 NECK PAIN: ICD-10-CM

## 2021-02-17 DIAGNOSIS — Z23 ENCOUNTER FOR IMMUNIZATION: Primary | ICD-10-CM

## 2021-02-17 DIAGNOSIS — M47.816 LUMBAR SPONDYLOSIS: ICD-10-CM

## 2021-02-17 PROCEDURE — 64491 INJ PARAVERT F JNT C/T 2 LEV: CPT | Performed by: PHYSICAL MEDICINE & REHABILITATION

## 2021-02-17 PROCEDURE — 64490 INJ PARAVERT F JNT C/T 1 LEV: CPT | Performed by: PHYSICAL MEDICINE & REHABILITATION

## 2021-02-17 RX ADMIN — Medication 1.5 ML: at 13:19

## 2021-02-17 NOTE — H&P
History of Present Illness:  The patient is a 66 y o  female who presents with complaints of axial neck pain    Patient Active Problem List   Diagnosis    Fibromyalgia    Glaucoma    Hyperlipidemia    Essential hypertension    Restless legs    Rheumatoid arthritis ([address] )    Pancreatic lesion    Anxiety    Dysthymia    Osteoarthritis of knee    Osteoarthritis of hip    Osteoporosis    Lumbosacral spondylosis without myelopathy    Macular degeneration    Chronic renal insufficiency, stage III (moderate)    Stenosis of right carotid artery    Family history of pancreatic cancer    History of right knee joint replacement    Chronic instability of right knee    Scoliosis, or kyphoscoliosis, idiopathic    Greater trochanteric bursitis of right hip    History of hip replacement, total, right    Chronic left-sided low back pain without sciatica    Lumbar spondylosis    Hyperkalemia    Trigger ring finger of right hand    Arthritis of left foot    Cervical spondylosis    Difficulty sleeping       Past Medical History:   Diagnosis Date    Benign paroxysmal positional vertigo due to bilateral vestibular disorder 1/28/2020    Breast cancer ([facility] [address] ) 2007    Bilateral mastectomy with chemotherapy    Chronic maxillary sinusitis 7/20/2020    Gastroesophageal reflux disease without esophagitis 1/28/2020    Hypertension     Hypertensive disorder 5/2/2019    Pancreatitis     PONV (postoperative nausea and vomiting)     Popliteal cyst, left 6/30/2020    PUD (peptic ulcer disease) 1/28/2020    Scoliosis     Skin cancer        Past Surgical History:   Procedure Laterality Date    BASAL CELL CARCINOMA EXCISION      nose    BREAST BIOPSY      BREAST IMPLANT Bilateral     BREAST SURGERY      double mastectomy    CARPAL TUNNEL RELEASE      CATARACT EXTRACTION      DILATION AND CURETTAGE OF UTERUS      GANGLION CYST EXCISION Left     wrist    HERNIA REPAIR      HIP SURGERY      HYSTERECTOMY      KNEE ARTHROSCOPY      KNEE SURGERY      MASTECTOMY Bilateral 2007    NASAL ENDOSCOPY W/ BALLON SINUPLASTY      x4     PLANTAR FASCIA SURGERY Bilateral 1990    TX NASAL/SINUS ENDOSCOPY,RMV TISS MAXILL SINUS Right 7/27/2020    Procedure: FUNCTIONAL ENDOSCOPIC SINUS SURGERY (FESS) IMAGED GUIDED, MAXILLARY ANTROSTOM Y  ;SEPTOPLASTY;  Surgeon: Tammy Cruz MD;  Location[facility];  Service: ENT   [address] [place] Bilateral 2004 2005    TOTAL ABDOMINAL HYSTERECTOMY      w RSO    TRIGGER FINGER RELEASE      TUBAL LIGATION           Current Outpatient Medications:     acetaminophen (TYLENOL) 500 mg tablet, Take 1 tablet (500 mg total) by mouth every 6 (six) hours as needed for mild pain, Disp: 30 tablet, Rfl: 0    aspirin (ECOTRIN LOW STRENGTH) 81 mg EC tablet, Take 81 mg by mouth daily, Disp: , Rfl:     atorvastatin (LIPITOR) 10 mg tablet, Take 0 5 tablets (5 mg total) by mouth daily, Disp: 45 tablet, Rfl: 0    benazepril (LOTENSIN) 40 MG tablet, TAKE 1 TABLET(40 MG) BY MOUTH DAILY, Disp: 90 tablet, Rfl: 1    citalopram (CeleXA) 20 mg tablet, Take 1 tablet (20 mg total) by mouth daily, Disp: 90 tablet, Rfl: 1    Coenzyme Q10 (CO Q-10) 200 MG CAPS, Take 1 tablet by mouth daily, Disp: , Rfl:     Diclofenac Sodium (VOLTAREN) 1 %, APPLY 2 GRAMS EXTERNALLY TO THE AFFECTED AREA FOUR TIMES DAILY, Disp: , Rfl:     latanoprost (XALATAN) 0 005 % ophthalmic solution, Administer 1 drop to both eyes daily, Disp: , Rfl:     MELATONIN PO, Take by mouth, Disp: , Rfl:     metoprolol succinate (TOPROL-XL) 50 mg 24 hr tablet, Take 1 tablet (50 mg total) by mouth daily at bedtime, Disp: 90 tablet, Rfl: 1    Multiple Vitamins-Minerals (PRESERVISION/LUTEIN PO), PreserVision Lutein, Disp: , Rfl:     pancrelipase, Lip-Prot-Amyl, (CREON) 12,000 units capsule, Take 12,000 units of lipase by mouth 3 (three) times a day with meals, Disp: 90 capsule, Rfl: 5   pravastatin (PRAVACHOL) 10 mg tablet, Take 10 mg by mouth daily Take 1/2 tablet oral route once daily, Disp: , Rfl:     spironolactone (ALDACTONE) 25 mg tablet, Take 1 tablet (25 mg total) by mouth daily, Disp: 90 tablet, Rfl: 0    traZODone (DESYREL) 50 mg tablet, Take 1 tablet (50 mg total) by mouth daily at bedtime as needed for sleep, Disp: 30 tablet, Rfl: 3    vitamin B-12 (VITAMIN B-12) 1,000 mcg tablet, Take by mouth daily, Disp: , Rfl:     Current Facility-Administered Medications:     lidocaine (PF) (XYLOCAINE-MPF) 2 % injection 5 mL, 5 mL, Perineural, Once, Amy Velasquez, DO    Allergies   Allergen Reactions    Dorzolamide Hcl-Timolol Mal Other (See Comments)     Tongue burning    Doxazosin Shortness Of Breath    Gabapentin Other (See Comments)    Lexapro [Escitalopram] Tremor    Lorazepam Hallucinations    Zoloft [Sertraline] Other (See Comments)     Diaphoresis and weight gain      Amlodipine Itching     Excessive wt gain    Amoxicillin-Pot Clavulanate Diarrhea    Buprenorphine Hcl Hallucinations    Carbidopa      Severe muscle spasms    Celecoxib Diarrhea    Chlorthalidone Edema     Itchiness, facial swelling, rash    Clindamycin Diarrhea    Clonidine Other (See Comments)     Burning mouth/tongue    Denosumab     Doxycycline Diarrhea and Nausea Only    Fosamax [Alendronate]      Severe stomach pain    Hydrocodone Hallucinations    Hydrocodone-Acetaminophen Hallucinations    Ibandronic Acid      Acid reflux    Levodopa      svere muscle spasms    Milnacipran      Extreme cold feeling    Pregabalin      sedation    Proline      Arthralgia myalgia    Ropinirole Headache    Rosuvastatin Itching    Tramadol Nausea Only     Severe stomach pain    Zenpep [Pancrelipase (Lip-Prot-Amyl)] Diarrhea     svere    Cefdinir Diarrhea and Rash    Rotigotine Rash       Physical Exam: There were no vitals filed for this visit    General: Awake, Alert, Oriented x 3, Mood and affect appropriate  Respiratory: Respirations even and unlabored  Cardiovascular: Peripheral pulses intact; no edema  Musculoskeletal Exam: axial neck pain    ASA Score: 2         Assessment:   1  Lumbar spondylosis    2  Neck pain    3   Cervical spondylosis        Plan: B/L C4-6 MBB#1 start with right side

## 2021-02-17 NOTE — DISCHARGE INSTRUCTIONS

## 2021-02-23 ENCOUNTER — TELEPHONE (OUTPATIENT)
Dept: INTERNAL MEDICINE CLINIC | Facility: CLINIC | Age: 76
End: 2021-02-23

## 2021-02-23 DIAGNOSIS — G47.9 DIFFICULTY SLEEPING: ICD-10-CM

## 2021-02-23 DIAGNOSIS — F34.1 DYSTHYMIA: ICD-10-CM

## 2021-02-23 DIAGNOSIS — F41.9 ANXIETY: Primary | ICD-10-CM

## 2021-02-23 RX ORDER — DOXEPIN HYDROCHLORIDE 3 MG/1
3 TABLET ORAL
Qty: 15 TABLET | Refills: 0 | Status: SHIPPED | OUTPATIENT
Start: 2021-02-23 | End: 2021-03-02

## 2021-02-23 NOTE — TELEPHONE ENCOUNTER
Patient is calling to state that the medication is making it worse then what she was before she was taking this medication  The trazdone is making stay up to 5:30-6 and then she will fall asleep till 7-7:30    So she is only getting an hour and half of sleep    She needs something else to take for her to sleep

## 2021-02-24 ENCOUNTER — TELEPHONE (OUTPATIENT)
Dept: RADIOLOGY | Facility: MEDICAL CENTER | Age: 76
End: 2021-02-24

## 2021-02-24 NOTE — TELEPHONE ENCOUNTER
S/W PT AND SCHEDULED RT C4-6 RFA ON 3/17/2021  - Reviewed instructions: , NPO 1 hour prior, loose-fitting/comfortable clothes, if ill/fever/infx/abx to call and reschedule  No immunizations or vaccinations 2w prior/after steroid injections   Hold the following meds: NONE

## 2021-02-24 NOTE — TELEPHONE ENCOUNTER
Pain diary reviewed by Dr Delaney Quintana; proceed with MBB; I will call and coordinate       Right C4-6 MBB #2

## 2021-02-24 NOTE — TELEPHONE ENCOUNTER
lmom to let patient know that a new medication was called in and stated to stop the trazodone right away and start taking this medication but only as a needed

## 2021-02-26 ENCOUNTER — TELEPHONE (OUTPATIENT)
Dept: INTERNAL MEDICINE CLINIC | Facility: CLINIC | Age: 76
End: 2021-02-26

## 2021-02-26 DIAGNOSIS — F51.01 PRIMARY INSOMNIA: ICD-10-CM

## 2021-02-26 DIAGNOSIS — F41.9 ANXIETY: Primary | ICD-10-CM

## 2021-02-26 NOTE — TELEPHONE ENCOUNTER
[facility] called saying that the doxepin that was ordered on 2/23/21 was to expensive for the patient $160 00 a month  They wanted to know if you could order something else, they gave an example of trazodone and said any other non controlled substance

## 2021-03-02 RX ORDER — HYDROXYZINE HYDROCHLORIDE 25 MG/1
25 TABLET, FILM COATED ORAL
Qty: 15 TABLET | Refills: 0 | Status: SHIPPED | OUTPATIENT
Start: 2021-03-02 | End: 2021-03-22 | Stop reason: SDUPTHER

## 2021-03-02 NOTE — TELEPHONE ENCOUNTER
Spoke with patient regarding her insomnia  She tried trazodone which caused worsening insomnia  Doxepin is not covered by her insurance  She has been taking melatonin and reports only getting 3 hours of sleep  Will prescribe Atarax 25 mg HS PRN for insomnia and to be taken with melatonin

## 2021-03-12 ENCOUNTER — TELEPHONE (OUTPATIENT)
Dept: INTERNAL MEDICINE CLINIC | Age: 76
End: 2021-03-12

## 2021-03-12 NOTE — TELEPHONE ENCOUNTER
Pain in stomach is worsening for her Chronic pancreatis, and could something be called into [facility] on [address]  Patient does have an appt with the Gastroenterologist in April 15 2021   Patient takes Creon and feels that this only works for a little while and then it comes back

## 2021-03-15 ENCOUNTER — TELEPHONE (OUTPATIENT)
Dept: GASTROENTEROLOGY | Facility: AMBULARY SURGERY CENTER | Age: 76
End: 2021-03-15

## 2021-03-15 ENCOUNTER — TELEPHONE (OUTPATIENT)
Dept: PAIN MEDICINE | Facility: CLINIC | Age: 76
End: 2021-03-15

## 2021-03-15 DIAGNOSIS — K86.89 PANCREATIC INSUFFICIENCY: Primary | ICD-10-CM

## 2021-03-15 DIAGNOSIS — R19.7 DIARRHEA, UNSPECIFIED TYPE: ICD-10-CM

## 2021-03-15 NOTE — TELEPHONE ENCOUNTER
Pt says she has to cancel procedure on 3/17 because she has to speak with her gastro doctor  Will call back when ready   Ph# [phone]

## 2021-03-15 NOTE — TELEPHONE ENCOUNTER
Patients GI provider:  Dr Kan Kahn     Number to return call: [phone]    Reason for call: Pt calling stated she has abdominal burning and creon is not working  Would like to speak with someone       Scheduled procedure/appointment date if applicable: Apt/procedure 4/15/21

## 2021-03-15 NOTE — TELEPHONE ENCOUNTER
Hx chronic pancreatitis, antral gastritis, fundoplication  Patient called for increased diarrhea, gas and abdominal pain x 2 weeks  She restarted creon in January with improvement of symptoms temporarily  8 loose BMs daily/ no blood or mucous with frequent fecal incontinence  Increased gas and rectal burning  Constant lower abdominal pain 7/10 and up to 10/10  No fever/ occasional nausea  Following bland diet/ decreased appetite  Trying to increase po fluids /fiber but only able to tolerate small amounts po fluids  Denies sob, dizziness, palpitations, confusion  ED evaluation if symptoms increase- sob, dizziness, palpitations, confusion, unable to tolerate po fluids for hydration  Preparation H for rectal burning  Creon dose? Stool testing?

## 2021-03-15 NOTE — TELEPHONE ENCOUNTER
Patient is managed by GI for this, she needs to call them and get additional guidance on what theyd like her to do   Thank you

## 2021-03-16 NOTE — TELEPHONE ENCOUNTER
Spoke with pt  Offered her a sooner appt but wants to wait til she gets her  labs drawn and stool testing done

## 2021-03-16 NOTE — TELEPHONE ENCOUNTER
I agree with your recommendations  Also, patient has not been seen since 6/2/2020  It seems she has an appointment on 4/15/2021, but please call patient and bring patient in ASAP instead  Will send stool tests and labwork for her to do in the meantime

## 2021-03-16 NOTE — TELEPHONE ENCOUNTER
**clerical -see below- please contact patient for ASAP appointment  Patient is aware of ordered blood and stool testing

## 2021-03-17 ENCOUNTER — APPOINTMENT (OUTPATIENT)
Dept: LAB | Facility: IMAGING CENTER | Age: 76
End: 2021-03-17
Payer: MEDICARE

## 2021-03-17 DIAGNOSIS — R19.7 DIARRHEA, UNSPECIFIED TYPE: ICD-10-CM

## 2021-03-17 LAB
ALBUMIN SERPL BCP-MCNC: 4.1 G/DL (ref 3.5–5)
ALP SERPL-CCNC: 97 U/L (ref 46–116)
ALT SERPL W P-5'-P-CCNC: 38 U/L (ref 12–78)
ANION GAP SERPL CALCULATED.3IONS-SCNC: 4 MMOL/L (ref 4–13)
AST SERPL W P-5'-P-CCNC: 24 U/L (ref 5–45)
BASOPHILS # BLD AUTO: 0.08 THOUSANDS/ΜL (ref 0–0.1)
BASOPHILS NFR BLD AUTO: 1 % (ref 0–1)
BILIRUB SERPL-MCNC: 0.62 MG/DL (ref 0.2–1)
BUN SERPL-MCNC: 15 MG/DL (ref 5–25)
CALCIUM SERPL-MCNC: 9.4 MG/DL (ref 8.3–10.1)
CHLORIDE SERPL-SCNC: 110 MMOL/L (ref 100–108)
CO2 SERPL-SCNC: 26 MMOL/L (ref 21–32)
CREAT SERPL-MCNC: 1.21 MG/DL (ref 0.6–1.3)
EOSINOPHIL # BLD AUTO: 0.4 THOUSAND/ΜL (ref 0–0.61)
EOSINOPHIL NFR BLD AUTO: 7 % (ref 0–6)
ERYTHROCYTE [DISTWIDTH] IN BLOOD BY AUTOMATED COUNT: 12.7 % (ref 11.6–15.1)
GFR SERPL CREATININE-BSD FRML MDRD: 43 ML/MIN/1.73SQ M
GLUCOSE P FAST SERPL-MCNC: 92 MG/DL (ref 65–99)
HCT VFR BLD AUTO: 43.2 % (ref 34.8–46.1)
HGB BLD-MCNC: 13.1 G/DL (ref 11.5–15.4)
IMM GRANULOCYTES # BLD AUTO: 0.02 THOUSAND/UL (ref 0–0.2)
IMM GRANULOCYTES NFR BLD AUTO: 0 % (ref 0–2)
LYMPHOCYTES # BLD AUTO: 1.29 THOUSANDS/ΜL (ref 0.6–4.47)
LYMPHOCYTES NFR BLD AUTO: 22 % (ref 14–44)
MCH RBC QN AUTO: 29.4 PG (ref 26.8–34.3)
MCHC RBC AUTO-ENTMCNC: 30.3 G/DL (ref 31.4–37.4)
MCV RBC AUTO: 97 FL (ref 82–98)
MONOCYTES # BLD AUTO: 0.74 THOUSAND/ΜL (ref 0.17–1.22)
MONOCYTES NFR BLD AUTO: 13 % (ref 4–12)
NEUTROPHILS # BLD AUTO: 3.37 THOUSANDS/ΜL (ref 1.85–7.62)
NEUTS SEG NFR BLD AUTO: 57 % (ref 43–75)
NRBC BLD AUTO-RTO: 0 /100 WBCS
PLATELET # BLD AUTO: 280 THOUSANDS/UL (ref 149–390)
PMV BLD AUTO: 10.5 FL (ref 8.9–12.7)
POTASSIUM SERPL-SCNC: 4.8 MMOL/L (ref 3.5–5.3)
PROT SERPL-MCNC: 7.4 G/DL (ref 6.4–8.2)
RBC # BLD AUTO: 4.45 MILLION/UL (ref 3.81–5.12)
SODIUM SERPL-SCNC: 140 MMOL/L (ref 136–145)
WBC # BLD AUTO: 5.9 THOUSAND/UL (ref 4.31–10.16)

## 2021-03-17 PROCEDURE — 80053 COMPREHEN METABOLIC PANEL: CPT

## 2021-03-17 PROCEDURE — 36415 COLL VENOUS BLD VENIPUNCTURE: CPT

## 2021-03-17 PROCEDURE — 85025 COMPLETE CBC W/AUTO DIFF WBC: CPT

## 2021-03-18 ENCOUNTER — APPOINTMENT (OUTPATIENT)
Dept: LAB | Facility: IMAGING CENTER | Age: 76
End: 2021-03-18
Payer: MEDICARE

## 2021-03-18 DIAGNOSIS — K86.89 PANCREATIC INSUFFICIENCY: ICD-10-CM

## 2021-03-18 DIAGNOSIS — R19.7 DIARRHEA, UNSPECIFIED TYPE: ICD-10-CM

## 2021-03-18 PROCEDURE — 82656 EL-1 FECAL QUAL/SEMIQ: CPT

## 2021-03-18 PROCEDURE — 87177 OVA AND PARASITES SMEARS: CPT

## 2021-03-18 PROCEDURE — 87209 SMEAR COMPLEX STAIN: CPT

## 2021-03-18 PROCEDURE — 83993 ASSAY FOR CALPROTECTIN FECAL: CPT

## 2021-03-18 PROCEDURE — 87505 NFCT AGENT DETECTION GI: CPT

## 2021-03-19 LAB
C DIFF TOX B TCDB STL QL NAA+PROBE: NEGATIVE
CAMPYLOBACTER DNA SPEC NAA+PROBE: NORMAL
G LAMBLIA AG STL QL IA: NEGATIVE
SALMONELLA DNA SPEC QL NAA+PROBE: NORMAL
SHIGA TOXIN STX GENE SPEC NAA+PROBE: NORMAL
SHIGELLA DNA SPEC QL NAA+PROBE: NORMAL

## 2021-03-21 LAB — CALPROTECTIN STL-MCNT: 24 UG/G (ref 0–120)

## 2021-03-22 DIAGNOSIS — F51.01 PRIMARY INSOMNIA: ICD-10-CM

## 2021-03-22 DIAGNOSIS — F41.9 ANXIETY: ICD-10-CM

## 2021-03-22 LAB — O+P STL CONC: NORMAL

## 2021-03-22 RX ORDER — HYDROXYZINE HYDROCHLORIDE 25 MG/1
12.5 TABLET, FILM COATED ORAL 2 TIMES DAILY PRN
Qty: 15 TABLET | Refills: 2 | Status: SHIPPED | OUTPATIENT
Start: 2021-03-22 | End: 2021-04-26

## 2021-03-22 NOTE — TELEPHONE ENCOUNTER
LOV: 1/4/21  NOV: 5/12/21      Looking for her hydroxyzine to be lowered  She stated that she usually cuts them in half and takes half in the morning and half in the pm because a whole make her sleep   Patient wants a call back with an update if dose is changed

## 2021-03-23 DIAGNOSIS — K86.89 PANCREATIC INSUFFICIENCY: Primary | ICD-10-CM

## 2021-03-23 LAB — ELASTASE PANC STL-MCNT: 139 UG ELAST./G

## 2021-03-24 ENCOUNTER — TELEPHONE (OUTPATIENT)
Dept: GASTROENTEROLOGY | Facility: MEDICAL CENTER | Age: 76
End: 2021-03-24

## 2021-03-24 DIAGNOSIS — K86.89 PANCREATIC INSUFFICIENCY: ICD-10-CM

## 2021-03-24 NOTE — TELEPHONE ENCOUNTER
[employer]      Result Notes     Jessica Campa   3/23/2021  1:23 PM EDT      Please inform the patient although she already has known pancreatic insufficiency, her fecal pancreatic elastase came back lower than what it was last year when it was checked  This could potentially be the cause of her increase in diarrhea, so I am going to increase her Creon  I will send in a new script to her pharamacy on file  Please also tell her to keep her appointment in April

## 2021-03-24 NOTE — TELEPHONE ENCOUNTER
Spoke with patient and discussed results, patient is aware of med increase and verbalized understanding  Patient has been approved for the [employer] financial assistance for Creon, approved until 12/31/2021  Providers,  Please print Creon Rx to [place] office and I will fax to [employer] assist at [phone]  Once they receive Rx, they will ship to patient  Thank you

## 2021-04-12 DIAGNOSIS — I10 ESSENTIAL HYPERTENSION: ICD-10-CM

## 2021-04-13 RX ORDER — SPIRONOLACTONE 25 MG/1
25 TABLET ORAL DAILY
Qty: 90 TABLET | Refills: 0 | Status: SHIPPED | OUTPATIENT
Start: 2021-04-13 | End: 2021-07-27 | Stop reason: ALTCHOICE

## 2021-04-15 ENCOUNTER — OFFICE VISIT (OUTPATIENT)
Dept: GASTROENTEROLOGY | Facility: MEDICAL CENTER | Age: 76
End: 2021-04-15
Payer: MEDICARE

## 2021-04-15 VITALS
WEIGHT: 140 LBS | HEIGHT: 58 IN | HEART RATE: 70 BPM | DIASTOLIC BLOOD PRESSURE: 80 MMHG | TEMPERATURE: 98 F | SYSTOLIC BLOOD PRESSURE: 160 MMHG | BODY MASS INDEX: 29.39 KG/M2

## 2021-04-15 DIAGNOSIS — K90.9 DIARRHEA DUE TO MALABSORPTION: ICD-10-CM

## 2021-04-15 DIAGNOSIS — R11.0 NAUSEA: ICD-10-CM

## 2021-04-15 DIAGNOSIS — R19.7 DIARRHEA DUE TO MALABSORPTION: ICD-10-CM

## 2021-04-15 DIAGNOSIS — K86.9 PANCREATIC LESION: Primary | ICD-10-CM

## 2021-04-15 PROCEDURE — 99214 OFFICE O/P EST MOD 30 MIN: CPT | Performed by: PHYSICIAN ASSISTANT

## 2021-04-15 RX ORDER — DIPHENOXYLATE HYDROCHLORIDE AND ATROPINE SULFATE 2.5; .025 MG/1; MG/1
1 TABLET ORAL 4 TIMES DAILY PRN
Qty: 120 TABLET | Refills: 3 | Status: SHIPPED | OUTPATIENT
Start: 2021-04-15 | End: 2021-07-07

## 2021-04-15 NOTE — PROGRESS NOTES
Assessment/Plan:     Diagnoses and all orders for this visit:    Pancreatic lesion  Nausea  Diarrhea due to malabsorption      Patient has a history of chronic pancreatitis associated with a pancreatic lesion in the uncinate process  Most recent ultrasound in July showed this was stable  Her last MRI was in March  She does have family history of pancreatic cancer in her father and her sister  Would recommend repeating MRI at this time  Patient has ongoing nausea and despite omeprazole 20 mg b i d     We did discuss potentially increasing  This was prescribed by her ENT for "silent reflux"  She states she is due to follow up with him and ask if they would recommend this  She has not tried a anti about  Her most concerning symptom is her diarrhea as she has occasional incontinence  She has tried Imodium without relief  She has also tried Zenpep and Creon and despite a moderate pancreatic insufficiency has not noticed any significant improvement  She also states she can not afford the medication and the company is providing it for her free of cost    She states she has never tried Lomotil  Her diarrhea may be secondary to pancreatic insufficiency however may also be related to irritable bowel as she has considerable anxiety  She is on Celexa 20 mg  She has a low long list of allergies and is unable to take multiple medications  She is agreeable to try the Lomotil however she does not want to try more than 1 medication at a time  Can also consider Zofran as well as Bentyl in the future  -     diphenoxylate-atropine (LOMOTIL) 2 5-0 025 mg per tablet; Take 1 tablet by mouth 4 (four) times a day as needed for diarrhea        -     MRI abdomen w wo contrast and mrcp; Future      Will see her back in 1-2 months or sooner if necessary  Subjective:      Patient ID: Osman Rivas is a 66 y o  female      HPI       This is a follow-up for chronic pancreatitis and pancreatic cyst   Patient  Complains of ongoing symptoms of nausea, abdominal pain, diarrhea  She does have known pancreatic insufficiency and was previously on Creon 48822 units t i d  She was complaining of 6-7 bowel movements per day with occasional incontinence and her Creon was increased to 25244 units t i d  She has not noticed any improvement in her symptoms  She states she previously was followed in [place] and at 1 point was on 30/6 1000 units t i d  again without any improvement  She also took Ed Snooks in the past which was also ineffective  Recently checked for C diff, stool culture, O&P and fecal calprotectin that were within normal limits  Fecal elastase was 136 in the moderate insufficiency stage  She states she typically has some abdominal discomfort but this worsens with bowel movements  She has been using Imodium without any improvement  She continues with nausea  She is on omeprazole 20 mg b i d  She does have a history of a prior gastric ulcer  She does have a known cyst in the uncinate process 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  She does have a family history of pancreatic cancer in her father and her sister  She did undergo endoscopic ultrasound in July showing previous fundoplication, erythematous mucosa in the antrum with depressed erosions, signs of chronic pancreatitis and a 9 mm x 13 mm cyst in the uncinate process  Biopsies did show gastritis but no signs of H pylori  Her last colonoscopy was  I colitiin April 2019, appeared within normal limits    Random biopsies were negative for microscopic      Patient Active Problem List   Diagnosis    Fibromyalgia    Glaucoma    Hyperlipidemia    Essential hypertension    Restless legs    Rheumatoid arthritis ([facility] [address] )    Pancreatic lesion    Anxiety    Dysthymia    Osteoarthritis of knee    Osteoarthritis of hip    Osteoporosis    Lumbosacral spondylosis without myelopathy    Macular degeneration    Chronic renal insufficiency, stage III (moderate)    Stenosis of right carotid artery    Family history of pancreatic cancer    History of right knee joint replacement    Chronic instability of right knee    Scoliosis, or kyphoscoliosis, idiopathic    Greater trochanteric bursitis of right hip    History of hip replacement, total, right    Chronic left-sided low back pain without sciatica    Lumbar spondylosis    Hyperkalemia    Trigger ring finger of right hand    Arthritis of left foot    Cervical spondylosis    Difficulty sleeping    Diarrhea due to malabsorption    Nausea     Allergies   Allergen Reactions    Dorzolamide Hcl-Timolol Mal Other (See Comments)     Tongue burning    Doxazosin Shortness Of Breath    Gabapentin Other (See Comments)    Lexapro [Escitalopram] Tremor    Lorazepam Hallucinations    Zoloft [Sertraline] Other (See Comments)     Diaphoresis and weight gain      Amlodipine Itching     Excessive wt gain    Amoxicillin-Pot Clavulanate Diarrhea    Buprenorphine Hcl Hallucinations    Carbidopa      Severe muscle spasms    Celecoxib Diarrhea    Chlorthalidone Edema     Itchiness, facial swelling, rash    Clindamycin Diarrhea    Clonidine Other (See Comments)     Burning mouth/tongue    Denosumab     Doxycycline Diarrhea and Nausea Only    Fosamax [Alendronate]      Severe stomach pain    Hydrocodone Hallucinations    Hydrocodone-Acetaminophen Hallucinations    Ibandronic Acid      Acid reflux    Levodopa      svere muscle spasms    Milnacipran      Extreme cold feeling    Pregabalin      sedation    Proline      Arthralgia myalgia    Ropinirole Headache    Rosuvastatin Itching    Tramadol Nausea Only     Severe stomach pain    Trazodone Other (See Comments)     Worsening insomnia     Zenpep [Pancrelipase (Lip-Prot-Amyl)] Diarrhea     svere    Cefdinir Diarrhea and Rash    Rotigotine Rash     Current Outpatient Medications on File Prior to Visit   Medication Sig    acetaminophen (TYLENOL) 500 mg tablet Take 1 tablet (500 mg total) by mouth every 6 (six) hours as needed for mild pain    aspirin (ECOTRIN LOW STRENGTH) 81 mg EC tablet Take 81 mg by mouth daily    atorvastatin (LIPITOR) 10 mg tablet Take 0 5 tablets (5 mg total) by mouth daily    benazepril (LOTENSIN) 40 MG tablet TAKE 1 TABLET(40 MG) BY MOUTH DAILY    citalopram (CeleXA) 20 mg tablet Take 1 tablet (20 mg total) by mouth daily    Coenzyme Q10 (CO Q-10) 200 MG CAPS Take 1 tablet by mouth daily    Diclofenac Sodium (VOLTAREN) 1 % APPLY 2 GRAMS EXTERNALLY TO THE AFFECTED AREA FOUR TIMES DAILY    hydrOXYzine HCL (ATARAX) 25 mg tablet Take 0 5 tablets (12 5 mg total) by mouth 2 (two) times a day as needed for anxiety (insomnia)    latanoprost (XALATAN) 0 005 % ophthalmic solution Administer 1 drop to both eyes daily    MELATONIN PO Take by mouth    metoprolol succinate (TOPROL-XL) 50 mg 24 hr tablet Take 1 tablet (50 mg total) by mouth daily at bedtime    Multiple Vitamins-Minerals (PRESERVISION/LUTEIN PO) PreserVision Lutein    pancrelipase, Lip-Prot-Amyl, (CREON) 24,000 units Take 24,000 units of lipase by mouth 3 (three) times a day with meals    pravastatin (PRAVACHOL) 10 mg tablet Take 10 mg by mouth daily Take 1/2 tablet oral route once daily    spironolactone (ALDACTONE) 25 mg tablet Take 1 tablet (25 mg total) by mouth daily    vitamin B-12 (VITAMIN B-12) 1,000 mcg tablet Take by mouth daily     No current facility-administered medications on file prior to visit        Family History   Problem Relation Age of Onset    No Known Problems Mother     No Known Problems Father     No Known Problems Maternal Grandmother     No Known Problems Maternal Grandfather     No Known Problems Paternal Grandmother     No Known Problems Paternal Grandfather      Past Medical History:   Diagnosis Date    Benign paroxysmal positional vertigo due to bilateral vestibular disorder 1/28/2020    Breast cancer [facility]) 2007    Bilateral mastectomy with chemotherapy    Chronic maxillary sinusitis 2020    Gastroesophageal reflux disease without esophagitis 2020    Hypertension     Hypertensive disorder 2019    Pancreatitis     PONV (postoperative nausea and vomiting)     Popliteal cyst, left 2020    PUD (peptic ulcer disease) 2020    Scoliosis     Skin cancer      Social History     Socioeconomic History    Marital status:       Spouse name: None    Number of children: None    Years of education: None    Highest education level: None   Occupational History    None   Social Needs    Financial resource strain: None    Food insecurity     Worry: None     Inability: None    Transportation needs     Medical: None     Non-medical: None   Tobacco Use    Smoking status: Former Smoker     Quit date:      Years since quittin 3    Smokeless tobacco: Never Used   Substance and Sexual Activity    Alcohol use: Not Currently    Drug use: Never    Sexual activity: Not Currently   Lifestyle    Physical activity     Days per week: None     Minutes per session: None    Stress: None   Relationships    Social connections     Talks on phone: None     Gets together: None     Attends Holiness service: None     Active member of club or organization: None     Attends meetings of clubs or organizations: None     Relationship status: None    Intimate partner violence     Fear of current or ex partner: None     Emotionally abused: None     Physically abused: None     Forced sexual activity: None   Other Topics Concern    None   Social History Narrative    None     Past Surgical History:   Procedure Laterality Date    BASAL CELL CARCINOMA EXCISION      nose    BREAST BIOPSY      BREAST IMPLANT Bilateral     BREAST SURGERY      double mastectomy    CARPAL TUNNEL RELEASE      CATARACT EXTRACTION      DILATION AND CURETTAGE OF UTERUS      GANGLION CYST EXCISION Left     wrist   [address] [facility] [address] HYSTERECTOMY      KNEE ARTHROSCOPY      KNEE SURGERY      MASTECTOMY Bilateral 2007    NASAL ENDOSCOPY W/ BALLON SINUPLASTY      x4     PLANTAR FASCIA SURGERY Bilateral 1990    [facility] NASAL/SINUS ENDOSCOPY,[facility] TISS MAXILL SINUS Right 7/27/2020    Procedure: FUNCTIONAL ENDOSCOPIC SINUS SURGERY (FESS) IMAGED GUIDED, MAXILLARY ANTROSTOM Y  ;SEPTOPLASTY;  Surgeon: Emir Sellers MD;  Location: [facility];  Service: ENT    [address] Bilateral 2004 2005    TOTAL ABDOMINAL HYSTERECTOMY      w RSO    TRIGGER FINGER RELEASE      TUBAL LIGATION           Review of Systems   Psychiatric/Behavioral:        Anxiety   All other systems reviewed and are negative          Objective:      /80 (BP Location: Left arm, Patient Position: Sitting, Cuff Size: Large)   Pulse 70   Temp 98 °F (36 7 °C) (Tympanic)   Ht 4' 10" (1 473 m)   Wt 63 5 kg (140 lb)   BMI 29 26 kg/m²          Physical Exam

## 2021-04-23 ENCOUNTER — TELEPHONE (OUTPATIENT)
Dept: GASTROENTEROLOGY | Facility: AMBULARY SURGERY CENTER | Age: 76
End: 2021-04-23

## 2021-04-23 DIAGNOSIS — K90.9 DIARRHEA DUE TO MALABSORPTION: Primary | ICD-10-CM

## 2021-04-23 DIAGNOSIS — R19.7 DIARRHEA DUE TO MALABSORPTION: Primary | ICD-10-CM

## 2021-04-23 RX ORDER — CHOLESTYRAMINE 4 G/9G
1 POWDER, FOR SUSPENSION ORAL
Qty: 60 PACKET | Refills: 2 | Status: SHIPPED | OUTPATIENT
Start: 2021-04-23 | End: 2021-04-26

## 2021-04-23 NOTE — TELEPHONE ENCOUNTER
Patients GI provider:  Dr Susie Webber    Number to return call: (   [phone]    Reason for call: Pt has been taking lomotil for diarrhea but it's causing her to have hallucinations   She would like another medicine for the diarrhea    Scheduled procedure/appointment date if applicable: Apt/procedure 6-18-21

## 2021-04-23 NOTE — TELEPHONE ENCOUNTER
According to her last office visit, the imodium did not work  If she wants to take this and it seems to be helping her, then she can do that  If not, we can try Jaylin  No need to increase the Creon as she only has moderate pancreatic insufficiency and it is quite an expensive prescription  Thanks!

## 2021-04-23 NOTE — TELEPHONE ENCOUNTER
Last seen in office 4/15/21 Valdez Bravo  EGD/Colon: N/A    Hx: Pancreatic insufficiency     Pt was on lomotil for diarrhea which worked  However, she developed hallucinations such as "My room is upside" "Pictures on floor" "windows are turn around", pt felt like she was falling into a hole while laying in bed  Pt stopped medication yesterday and hallucinations have improved  Since then she has diarrhea and is going to take imdoium in the mean time which sometimes is effective  Advised pt to take psyllium fiber daily  She stopped fiber because it did not help however she was taking tablets  Explained powder is generally more effective, pt verbalized understanding and will start  Increase creon? Add questran for prn use? Pt is a little hesitant to start a liquid medication because has had emesis in past with liquid meds   Please advise

## 2021-04-26 ENCOUNTER — OFFICE VISIT (OUTPATIENT)
Dept: INTERNAL MEDICINE CLINIC | Facility: CLINIC | Age: 76
End: 2021-04-26
Payer: MEDICARE

## 2021-04-26 VITALS
TEMPERATURE: 97.6 F | BODY MASS INDEX: 29.18 KG/M2 | HEART RATE: 67 BPM | WEIGHT: 139 LBS | SYSTOLIC BLOOD PRESSURE: 156 MMHG | HEIGHT: 58 IN | DIASTOLIC BLOOD PRESSURE: 90 MMHG | OXYGEN SATURATION: 98 %

## 2021-04-26 DIAGNOSIS — K86.89 PANCREATIC INSUFFICIENCY: Primary | ICD-10-CM

## 2021-04-26 DIAGNOSIS — F41.9 ANXIETY: ICD-10-CM

## 2021-04-26 DIAGNOSIS — R19.7 DIARRHEA DUE TO MALABSORPTION: ICD-10-CM

## 2021-04-26 DIAGNOSIS — R10.9 FLANK PAIN: ICD-10-CM

## 2021-04-26 DIAGNOSIS — K90.9 DIARRHEA DUE TO MALABSORPTION: ICD-10-CM

## 2021-04-26 DIAGNOSIS — F51.01 PRIMARY INSOMNIA: ICD-10-CM

## 2021-04-26 LAB
SL AMB  POCT GLUCOSE, UA: ABNORMAL
SL AMB LEUKOCYTE ESTERASE,UA: ABNORMAL
SL AMB POCT BILIRUBIN,UA: ABNORMAL
SL AMB POCT BLOOD,UA: ABNORMAL
SL AMB POCT CLARITY,UA: CLEAR
SL AMB POCT COLOR,UA: ABNORMAL
SL AMB POCT KETONES,UA: ABNORMAL
SL AMB POCT NITRITE,UA: ABNORMAL
SL AMB POCT PH,UA: 5.5
SL AMB POCT SPECIFIC GRAVITY,UA: 1.01
SL AMB POCT URINE PROTEIN: ABNORMAL
SL AMB POCT UROBILINOGEN: 0.2

## 2021-04-26 PROCEDURE — 99213 OFFICE O/P EST LOW 20 MIN: CPT | Performed by: INTERNAL MEDICINE

## 2021-04-26 PROCEDURE — 81003 URINALYSIS AUTO W/O SCOPE: CPT | Performed by: INTERNAL MEDICINE

## 2021-04-26 RX ORDER — HYDROXYZINE HYDROCHLORIDE 10 MG/1
10 TABLET, FILM COATED ORAL
Qty: 30 TABLET | Refills: 3 | Status: SHIPPED | OUTPATIENT
Start: 2021-04-26 | End: 2021-07-07

## 2021-04-26 RX ORDER — DICYCLOMINE HYDROCHLORIDE 10 MG/1
20 CAPSULE ORAL
Qty: 30 CAPSULE | Refills: 0 | Status: SHIPPED | OUTPATIENT
Start: 2021-04-26 | End: 2021-04-30 | Stop reason: SDUPTHER

## 2021-04-26 NOTE — PROGRESS NOTES
Assessment/Plan:    Diarrhea due to malabsorption  Discussed adequate oral hydration and continuing fiber supplementation  Continue Creon  Will prescribe dicyclomine  Follow-up with Gastroenterology  Diagnoses and all orders for this visit:    Pancreatic insufficiency  -     Ambulatory referral to Gastroenterology; Future    Diarrhea due to malabsorption  -     Ambulatory referral to Gastroenterology; Future  -     dicyclomine (BENTYL) 10 mg capsule; Take 2 capsules (20 mg total) by mouth 4 (four) times a day (before meals and at bedtime) As needed for diarrhea    Anxiety  -     hydrOXYzine HCL (ATARAX) 10 mg tablet; Take 1 tablet (10 mg total) by mouth daily at bedtime as needed for anxiety (insomnia)    Primary insomnia  -     hydrOXYzine HCL (ATARAX) 10 mg tablet; Take 1 tablet (10 mg total) by mouth daily at bedtime as needed for anxiety (insomnia)                  Subjective:      Patient ID: Claudean Knack is a 66 y o  female  Chief Complaint   Patient presents with    Flank Pain     C/O right sided flank pain       66year old female is seen today with acute symptoms of   She has been experiencing of diarrhea since 2 months  She has been in contact with her gastroenterologist who increased her Creon and yet still was experiencing diarrhea with meals  She was given Lomotil, which also provided mild relief  She is waiting to hear back from her GI doctor as she was prescribed cholestyramine however this was too expensive  She has also been experiencing right flank pain since the onset of diarrhea  She denies any hematuria, foul odor to urine  She has been experiencing dysuria over due to excessive wiping  Flank Pain  This is a new problem  The current episode started in the past 7 days  The problem is unchanged  Pertinent negatives include no abdominal pain, chest pain, dysuria, fever, headaches, numbness or weakness         The following portions of the patient's history were reviewed and updated as appropriate: allergies, current medications, past family history, past medical history, past social history, past surgical history and problem list     Review of Systems   Constitutional: Negative for activity change, appetite change, chills, diaphoresis, fatigue and fever  HENT: Negative for congestion, postnasal drip, rhinorrhea, sinus pressure, sinus pain, sneezing and sore throat  Eyes: Negative for visual disturbance  Respiratory: Negative for apnea, cough, choking, chest tightness, shortness of breath and wheezing  Cardiovascular: Negative for chest pain, palpitations and leg swelling  Gastrointestinal: Negative for abdominal distention, abdominal pain, anal bleeding, blood in stool, constipation, diarrhea, nausea and vomiting  Endocrine: Negative for cold intolerance and heat intolerance  Genitourinary: Positive for flank pain  Negative for difficulty urinating, dysuria and hematuria  Skin: Negative  Neurological: Negative for dizziness, weakness, light-headedness, numbness and headaches  Hematological: Negative for adenopathy  Psychiatric/Behavioral: Negative for agitation, sleep disturbance and suicidal ideas  All other systems reviewed and are negative          Past Medical History:   Diagnosis Date    Benign paroxysmal positional vertigo due to bilateral vestibular disorder 1/28/2020    Breast cancer ([facility] [address] ) 2007    Bilateral mastectomy with chemotherapy    Chronic maxillary sinusitis 7/20/2020    Gastroesophageal reflux disease without esophagitis 1/28/2020    Hypertension     Hypertensive disorder 5/2/2019    Pancreatitis     PONV (postoperative nausea and vomiting)     Popliteal cyst, left 6/30/2020    PUD (peptic ulcer disease) 1/28/2020    Scoliosis     Skin cancer          Current Outpatient Medications:     acetaminophen (TYLENOL) 500 mg tablet, Take 1 tablet (500 mg total) by mouth every 6 (six) hours as needed for mild pain, Disp: 30 tablet, Rfl: 0    aspirin (ECOTRIN LOW STRENGTH) 81 mg EC tablet, Take 81 mg by mouth daily, Disp: , Rfl:     atorvastatin (LIPITOR) 10 mg tablet, Take 0 5 tablets (5 mg total) by mouth daily, Disp: 45 tablet, Rfl: 0    benazepril (LOTENSIN) 40 MG tablet, TAKE 1 TABLET(40 MG) BY MOUTH DAILY, Disp: 90 tablet, Rfl: 1    citalopram (CeleXA) 20 mg tablet, Take 1 tablet (20 mg total) by mouth daily, Disp: 90 tablet, Rfl: 1    Coenzyme Q10 (CO Q-10) 200 MG CAPS, Take 1 tablet by mouth daily, Disp: , Rfl:     Diclofenac Sodium (VOLTAREN) 1 %, APPLY 2 GRAMS EXTERNALLY TO THE AFFECTED AREA FOUR TIMES DAILY, Disp: , Rfl:     diphenoxylate-atropine (LOMOTIL) 2 5-0 025 mg per tablet, Take 1 tablet by mouth 4 (four) times a day as needed for diarrhea, Disp: 120 tablet, Rfl: 3    hydrOXYzine HCL (ATARAX) 10 mg tablet, Take 1 tablet (10 mg total) by mouth daily at bedtime as needed for anxiety (insomnia), Disp: 30 tablet, Rfl: 3    latanoprost (XALATAN) 0 005 % ophthalmic solution, Administer 1 drop to both eyes daily, Disp: , Rfl:     MELATONIN PO, Take by mouth, Disp: , Rfl:     metoprolol succinate (TOPROL-XL) 50 mg 24 hr tablet, Take 1 tablet (50 mg total) by mouth daily at bedtime, Disp: 90 tablet, Rfl: 1    Multiple Vitamins-Minerals (PRESERVISION/LUTEIN PO), PreserVision Lutein, Disp: , Rfl:     pancrelipase, Lip-Prot-Amyl, (CREON) 24,000 units, Take 24,000 units of lipase by mouth 3 (three) times a day with meals, Disp: 90 capsule, Rfl: 3    pravastatin (PRAVACHOL) 10 mg tablet, Take 10 mg by mouth daily Take 1/2 tablet oral route once daily, Disp: , Rfl:     spironolactone (ALDACTONE) 25 mg tablet, Take 1 tablet (25 mg total) by mouth daily, Disp: 90 tablet, Rfl: 0    vitamin B-12 (VITAMIN B-12) 1,000 mcg tablet, Take by mouth daily, Disp: , Rfl:     dicyclomine (BENTYL) 10 mg capsule, Take 2 capsules (20 mg total) by mouth 4 (four) times a day (before meals and at bedtime) As needed for diarrhea, Disp: 30 capsule, Rfl: 0    Allergies   Allergen Reactions    Dorzolamide Hcl-Timolol Mal Other (See Comments)     Tongue burning    Doxazosin Shortness Of Breath    Gabapentin Other (See Comments)    Lexapro [Escitalopram] Tremor    Lorazepam Hallucinations    Zoloft [Sertraline] Other (See Comments)     Diaphoresis and weight gain      Amlodipine Itching     Excessive wt gain    Amoxicillin-Pot Clavulanate Diarrhea    Buprenorphine Hcl Hallucinations    Carbidopa      Severe muscle spasms    Celecoxib Diarrhea    Chlorthalidone Edema     Itchiness, facial swelling, rash    Clindamycin Diarrhea    Clonidine Other (See Comments)     Burning mouth/tongue    Denosumab     Doxycycline Diarrhea and Nausea Only    Fosamax [Alendronate]      Severe stomach pain    Hydrocodone Hallucinations    Hydrocodone-Acetaminophen Hallucinations    Ibandronic Acid      Acid reflux    Levodopa      svere muscle spasms    Milnacipran      Extreme cold feeling    Pregabalin      sedation    Proline      Arthralgia myalgia    Ropinirole Headache    Rosuvastatin Itching    Tramadol Nausea Only     Severe stomach pain    Trazodone Other (See Comments)     Worsening insomnia     Zenpep [Pancrelipase (Lip-Prot-Amyl)] Diarrhea     svere    Cefdinir Diarrhea and Rash    Rotigotine Rash       Social History   Past Surgical History:   Procedure Laterality Date    BASAL CELL CARCINOMA EXCISION      nose    BREAST BIOPSY      BREAST IMPLANT Bilateral     BREAST SURGERY      double mastectomy    CARPAL TUNNEL RELEASE      CATARACT EXTRACTION      DILATION AND CURETTAGE OF UTERUS      GANGLION CYST EXCISION Left     wrist    HERNIA REPAIR      HIP SURGERY      HYSTERECTOMY      KNEE ARTHROSCOPY      KNEE SURGERY      MASTECTOMY Bilateral 2007    NASAL ENDOSCOPY W/ BALLON SINUPLASTY      x4     PLANTAR FASCIA SURGERY Bilateral 1990    TX NASAL/SINUS ENDOSCOPY,RMV TISS MAXILL SINUS Right 7/27/2020    Procedure: FUNCTIONAL ENDOSCOPIC SINUS SURGERY (FESS) IMAGED GUIDED, MAXILLARY ANTROSTOM Y  ;SEPTOPLASTY;  Surgeon: Ruth Almaguer MD;  [facility];  Service: ENT   [address] [facility] Bilateral 2004 2005    TOTAL ABDOMINAL HYSTERECTOMY      w RSO    TRIGGER FINGER RELEASE      TUBAL LIGATION       Family History   Problem Relation Age of Onset    No Known Problems Mother     No Known Problems Father     No Known Problems Maternal Grandmother     No Known Problems Maternal Grandfather     No Known Problems Paternal Grandmother     No Known Problems Paternal Grandfather        Objective:  /90 (BP Location: Left arm, Patient Position: Sitting, Cuff Size: Adult)   Pulse 67   Temp 97 6 °F (36 4 °C)   Ht 4' 10" (1 473 m)   Wt 63 kg (139 lb)   SpO2 98%   BMI 29 05 kg/m²     Recent Results (from the past 1344 hour(s))   CBC and differential    Collection Time: 03/17/21 10:44 AM   Result Value Ref Range    WBC 5 90 4 31 - 10 16 Thousand/uL    RBC 4 45 3 81 - 5 12 Million/uL    Hemoglobin 13 1 11 5 - 15 4 g/dL    Hematocrit 43 2 34 8 - 46 1 %    MCV 97 82 - 98 fL    MCH 29 4 26 8 - 34 3 pg    MCHC 30 3 (L) 31 4 - 37 4 g/dL    RDW 12 7 11 6 - 15 1 %    MPV 10 5 8 9 - 12 7 fL    Platelets 463 092 - 997 Thousands/uL    nRBC 0 /100 WBCs    Neutrophils Relative 57 43 - 75 %    Immat GRANS % 0 0 - 2 %    Lymphocytes Relative 22 14 - 44 %    Monocytes Relative 13 (H) 4 - 12 %    Eosinophils Relative 7 (H) 0 - 6 %    Basophils Relative 1 0 - 1 %    Neutrophils Absolute 3 37 1 85 - 7 62 Thousands/µL    Immature Grans Absolute 0 02 0 00 - 0 20 Thousand/uL    Lymphocytes Absolute 1 29 0 60 - 4 47 Thousands/µL    Monocytes Absolute 0 74 0 17 - 1 22 Thousand/µL    Eosinophils Absolute 0 40 0 00 - 0 61 Thousand/µL    Basophils Absolute 0 08 0 00 - 0 10 Thousands/µL   Comprehensive metabolic panel    Collection Time: 03/17/21 10:44 AM   Result Value Ref Range    Sodium 140 136 - 145 mmol/L    Potassium 4 8 3 5 - 5 3 mmol/L    Chloride 110 (H) 100 - 108 mmol/L    CO2 26 21 - 32 mmol/L    ANION GAP 4 4 - 13 mmol/L    BUN 15 5 - 25 mg/dL    Creatinine 1 21 0 60 - 1 30 mg/dL    Glucose, Fasting 92 65 - 99 mg/dL    Calcium 9 4 8 3 - 10 1 mg/dL    AST 24 5 - 45 U/L    ALT 38 12 - 78 U/L    Alkaline Phosphatase 97 46 - 116 U/L    Total Protein 7 4 6 4 - 8 2 g/dL    Albumin 4 1 3 5 - 5 0 g/dL    Total Bilirubin 0 62 0 20 - 1 00 mg/dL    eGFR 43 ml/min/1 73sq m   Calprotectin,Fecal    Collection Time: 03/18/21  9:59 AM   Result Value Ref Range    Calprotectin 24 0 - 120 ug/g   Giardia antigen    Collection Time: 03/18/21  9:59 AM    Specimen: Stool   Result Value Ref Range    Giardia Ag, Stl Negative Negative   Clostridium difficile toxin by PCR with EIA    Collection Time: 03/18/21  9:59 AM    Specimen: Stool   Result Value Ref Range     C difficile toxin by PCR  Negative Negative   Stool Enteric Bacterial Panel by PCR    Collection Time: 03/18/21  9:59 AM    Specimen: Stool   Result Value Ref Range    Salmonella sp PCR None Detected None Detected    Shigella sp/Enteroinvasive E  coli (EIEC) PCR None Detected None Detected    Campylobacter sp (jejuni and coli) PCR None Detected None Detected    Shiga toxin 1/Shiga toxin 2 genes PCR None Detected None Detected   Pancreatic elastase, fecal    Collection Time: 03/18/21  9:59 AM   Result Value Ref Range    Pancreatic Elastase-1 139 (L) >200 ug Elast /g   Ova and parasite examination    Collection Time: 03/18/21  9:59 AM    Specimen: Stool   Result Value Ref Range    Ova + Parasite Exam       No ova, cysts, or parasites seen     One negative specimen does not rule out the possibility of a  parasitic infection  Physical Exam  Vitals signs and nursing note reviewed  Constitutional:       General: She is not in acute distress  Appearance: She is well-developed   She is not diaphoretic  HENT:      Head: Normocephalic and atraumatic  Eyes:      General:         Right eye: No discharge  Left eye: No discharge  Conjunctiva/sclera: Conjunctivae normal       Pupils: Pupils are equal, round, and reactive to light  Neck:      Musculoskeletal: Normal range of motion and neck supple  Thyroid: No thyromegaly  Vascular: No JVD  Cardiovascular:      Rate and Rhythm: Normal rate and regular rhythm  Heart sounds: Normal heart sounds  No murmur  No friction rub  No gallop  Pulmonary:      Effort: Pulmonary effort is normal  No respiratory distress  Breath sounds: Normal breath sounds  No wheezing or rales  Chest:      Chest wall: No tenderness  Abdominal:      General: There is no distension  Palpations: Abdomen is soft  Tenderness: There is no abdominal tenderness  Musculoskeletal: Normal range of motion  General: No tenderness or deformity  Arms:    Lymphadenopathy:      Cervical: No cervical adenopathy  Skin:     General: Skin is warm and dry  Coloration: Skin is not pale  Findings: No erythema or rash  Neurological:      Mental Status: She is alert and oriented to person, place, and time  Cranial Nerves: No cranial nerve deficit  Coordination: Coordination normal    Psychiatric:         Behavior: Behavior normal          Thought Content:  Thought content normal          Judgment: Judgment normal

## 2021-04-26 NOTE — ASSESSMENT & PLAN NOTE
Discussed adequate oral hydration and continuing fiber supplementation  Continue Creon  Will prescribe dicyclomine  Follow-up with Gastroenterology

## 2021-04-30 DIAGNOSIS — K90.9 DIARRHEA DUE TO MALABSORPTION: ICD-10-CM

## 2021-04-30 DIAGNOSIS — R19.7 DIARRHEA DUE TO MALABSORPTION: ICD-10-CM

## 2021-04-30 RX ORDER — DICYCLOMINE HYDROCHLORIDE 10 MG/1
20 CAPSULE ORAL EVERY 6 HOURS PRN
Qty: 90 CAPSULE | Refills: 2 | Status: SHIPPED | OUTPATIENT
Start: 2021-04-30 | End: 2021-07-07

## 2021-04-30 NOTE — TELEPHONE ENCOUNTER
LOV: 4/26/21  NOV: 5/12/21          Is the a daily medication where we can prescribe more at one time? I was going to order one month 240 with 2 refills?

## 2021-05-03 ENCOUNTER — OFFICE VISIT (OUTPATIENT)
Dept: GASTROENTEROLOGY | Facility: CLINIC | Age: 76
End: 2021-05-03
Payer: MEDICARE

## 2021-05-03 VITALS
HEIGHT: 59 IN | SYSTOLIC BLOOD PRESSURE: 140 MMHG | WEIGHT: 138.8 LBS | BODY MASS INDEX: 27.98 KG/M2 | TEMPERATURE: 99.1 F | DIASTOLIC BLOOD PRESSURE: 80 MMHG | HEART RATE: 69 BPM

## 2021-05-03 DIAGNOSIS — K86.89 PANCREATIC INSUFFICIENCY: Primary | ICD-10-CM

## 2021-05-03 DIAGNOSIS — R19.7 DIARRHEA DUE TO MALABSORPTION: ICD-10-CM

## 2021-05-03 DIAGNOSIS — K86.9 PANCREATIC LESION: ICD-10-CM

## 2021-05-03 DIAGNOSIS — K90.9 DIARRHEA DUE TO MALABSORPTION: ICD-10-CM

## 2021-05-03 PROCEDURE — 99214 OFFICE O/P EST MOD 30 MIN: CPT | Performed by: INTERNAL MEDICINE

## 2021-05-03 RX ORDER — SODIUM, POTASSIUM,MAG SULFATES 17.5-3.13G
177 SOLUTION, RECONSTITUTED, ORAL ORAL ONCE
Qty: 177 ML | Refills: 0 | Status: SHIPPED | OUTPATIENT
Start: 2021-05-03 | End: 2021-07-07

## 2021-05-03 RX ORDER — PANCRELIPASE LIPASE, PANCRELIPASE PROTEASE, PANCRELIPASE AMYLASE 40000; 126000; 168000 [USP'U]/1; [USP'U]/1; [USP'U]/1
2 CAPSULE, DELAYED RELEASE ORAL
Qty: 180 CAPSULE | Refills: 5 | Status: SHIPPED | OUTPATIENT
Start: 2021-05-03 | End: 2021-05-04

## 2021-05-03 NOTE — PATIENT INSTRUCTIONS
Colon scheduled for Thursday, 7/1/21, at [facility] with Dr Franny Spain  Dulcolax/Miralax prep instructions provided to patient by [facility]

## 2021-05-03 NOTE — PROGRESS NOTES
[facility] - Outpatient Follow-up Note  Lazaro Vogt 66 y o  female MRN: [id number]  Encounter: [id number]          ASSESSMENT AND PLAN:      1  Pancreatic insufficiency  2  Pancreatic lesion  3  Diarrhea due to malabsorption    She has exocrine pancreatic insufficiency due to chronic pancreatitis  The small cyst in her uncinate may be a small pseudocyst   She is due for a repeat MRI in July which would be 12 months after her endoscopic ultrasound  I will increase her pancreatic enzyme supplementation to Zenpep  66252 units with each meal and schedule her for colonoscopy to evaluate for microscopic colitis and inflammatory bowel disease as other possible reasons for her chronic diarrhea  - Colonoscopy; Future  - Na Sulfate-K Sulfate-Mg Sulf (Suprep Bowel Prep Kit) 17 5-3 13-1 6 GM/177ML SOLN; Take 177 mL by mouth once for 1 dose  Dispense: 177 mL; Refill: 0  - Pancrelipase, Lip-Prot-Amyl, (Zenpep) 23982-634018 units CPEP; Take 2 capsules by mouth 3 (three) times a day with meals  Dispense: 180 capsule; Refill: 5    ______________________________________________________________________    SUBJECTIVE:   She has a history of exocrine pancreatic insufficiency due to chronic pancreatitis  Her endoscopic ultrasound last year did not show evidence of pancreatic cancer but was consistent with chronic pancreatitis and there was a small cyst in the uncinate  She has continued to have diarrhea and lower abdominal pain and bloating but denies any bleeding or weight loss  She has taken Zenpep in Creon in the past but does not feel they have ever completely controlled her diarrhea  She denies any reflux, nausea, vomiting, and dysphagia  She believes her last colonoscopy was approximately two years ago  Her last upper endoscopy was approximately one year ago at the same time as her endoscopic ultrasound  REVIEW OF SYSTEMS IS OTHERWISE NEGATIVE        Historical Information   Past Medical History:   Diagnosis Date    Benign paroxysmal positional vertigo due to bilateral vestibular disorder 2020    Breast cancer ([facility] [address] ) 2007    Bilateral mastectomy with chemotherapy    Chronic maxillary sinusitis 2020    Gastroesophageal reflux disease without esophagitis 2020    Hypertension     Hypertensive disorder 2019    Pancreatitis     PONV (postoperative nausea and vomiting)     Popliteal cyst, left 2020    PUD (peptic ulcer disease) 2020    Scoliosis     Skin cancer      Past Surgical History:   Procedure Laterality Date    BASAL CELL CARCINOMA EXCISION      nose    BREAST BIOPSY      BREAST IMPLANT Bilateral     BREAST SURGERY      double mastectomy    CARPAL TUNNEL RELEASE      CATARACT EXTRACTION      DILATION AND CURETTAGE OF UTERUS      GANGLION CYST EXCISION Left     wrist    HERNIA REPAIR      HIP SURGERY      HYSTERECTOMY      KNEE ARTHROSCOPY      KNEE SURGERY      MASTECTOMY Bilateral     NASAL ENDOSCOPY W/ BALLON SINUPLASTY      x4     PLANTAR FASCIA SURGERY Bilateral     [place] NASAL/SINUS ENDOSCOPY,RMV TISS MAXILL SINUS Right 2020    Procedure: FUNCTIONAL ENDOSCOPIC SINUS SURGERY (FESS) IMAGED GUIDED, MAXILLARY ANTROSTOM Y  ;SEPTOPLASTY;  Surgeon: Des Lou MD;  [facility];  Service: ENT   [address] [place] [address] Bilateral 2004    TOTAL ABDOMINAL HYSTERECTOMY      w RSO    TRIGGER FINGER RELEASE      TUBAL LIGATION       Social History   Social History     Substance and Sexual Activity   Alcohol Use Not Currently     Social History     Substance and Sexual Activity   Drug Use Never     Social History     Tobacco Use   Smoking Status Former Smoker    Quit date: 34    Years since quittin 3   Smokeless Tobacco Never Used     Family History   Problem Relation Age of Onset    No Known Problems Mother     No Known Problems Father     No Known Problems Maternal Grandmother     No Known Problems Maternal Grandfather     No Known Problems Paternal Grandmother     No Known Problems Paternal Grandfather        Meds/Allergies       Current Outpatient Medications:     acetaminophen (TYLENOL) 500 mg tablet    aspirin (ECOTRIN LOW STRENGTH) 81 mg EC tablet    atorvastatin (LIPITOR) 10 mg tablet    benazepril (LOTENSIN) 40 MG tablet    citalopram (CeleXA) 20 mg tablet    Coenzyme Q10 (CO Q-10) 200 MG CAPS    Diclofenac Sodium (VOLTAREN) 1 %    dicyclomine (BENTYL) 10 mg capsule    diphenoxylate-atropine (LOMOTIL) 2 5-0 025 mg per tablet    hydrOXYzine HCL (ATARAX) 10 mg tablet    latanoprost (XALATAN) 0 005 % ophthalmic solution    MELATONIN PO    metoprolol succinate (TOPROL-XL) 50 mg 24 hr tablet    Multiple Vitamins-Minerals (PRESERVISION/LUTEIN PO)    pravastatin (PRAVACHOL) 10 mg tablet    spironolactone (ALDACTONE) 25 mg tablet    vitamin B-12 (VITAMIN B-12) 1,000 mcg tablet    Na Sulfate-K Sulfate-Mg Sulf (Suprep Bowel Prep Kit) 17 5-3 13-1 6 GM/177ML SOLN    Pancrelipase, Lip-Prot-Amyl, (Zenpep) 80504-404980 units CPEP    Allergies   Allergen Reactions    Dorzolamide Hcl-Timolol Mal Other (See Comments)     Tongue burning    Doxazosin Shortness Of Breath    Gabapentin Other (See Comments)    Lexapro [Escitalopram] Tremor    Lorazepam Hallucinations    Zoloft [Sertraline] Other (See Comments)     Diaphoresis and weight gain      Amlodipine Itching     Excessive wt gain    Amoxicillin-Pot Clavulanate Diarrhea    Buprenorphine Hcl Hallucinations    Carbidopa      Severe muscle spasms    Celecoxib Diarrhea    Chlorthalidone Edema     Itchiness, facial swelling, rash    Clindamycin Diarrhea    Clonidine Other (See Comments)     Burning mouth/tongue    Denosumab     Doxycycline Diarrhea and Nausea Only    Fosamax [Alendronate]      Severe stomach pain    Hydrocodone Hallucinations    Hydrocodone-Acetaminophen Hallucinations    Ibandronic Acid      Acid reflux    Levodopa      svere muscle spasms    Milnacipran      Extreme cold feeling    Pregabalin      sedation    Proline      Arthralgia myalgia    Ropinirole Headache    Rosuvastatin Itching    Tramadol Nausea Only     Severe stomach pain    Trazodone Other (See Comments)     Worsening insomnia     Zenpep [Pancrelipase (Lip-Prot-Amyl)] Diarrhea     svere    Cefdinir Diarrhea and Rash    Rotigotine Rash           Objective     Blood pressure 140/80, pulse 69, temperature 99 1 °F (37 3 °C), temperature source Tympanic, height 4' 11" (1 499 m), weight 63 kg (138 lb 12 8 oz), not currently breastfeeding  Body mass index is 28 03 kg/m²  PHYSICAL EXAM:      General Appearance:   Alert, cooperative, no distress   HEENT:   Normocephalic, atraumatic, anicteric      Neck:  Supple, symmetrical, trachea midline   Lungs:   Clear to auscultation bilaterally; no rales, rhonchi or wheezing; respirations unlabored    Heart[de-identified]   Regular rate and rhythm; no murmur, rub, or gallop  Abdomen:   Soft, mild generalized tenderness, non-distended; normal bowel sounds; no masses, no organomegaly    Genitalia:   Deferred    Rectal:   Deferred    Extremities:  No cyanosis, clubbing or edema    Pulses:  2+ and symmetric    Skin:  No jaundice, rashes, or lesions    Lymph nodes:  No palpable cervical lymphadenopathy        Lab Results:   No visits with results within 1 Day(s) from this visit     Latest known visit with results is:   Office Visit on 04/26/2021   Component Date Value     COLOR,UA 04/26/2021 light Yellow     CLARITY,UA 04/26/2021 Clear     SPECIFIC GRAVITY,UA 04/26/2021 1 010      PH,UA 04/26/2021 5 5     LEUKOCYTE ESTERASE,UA 04/26/2021 Trace     NITRITE,UA 04/26/2021 Neg     GLUCOSE, UA 04/26/2021 Neg     KETONES,UA 04/26/2021 Neg     BILIRUBIN,UA 04/26/2021 Neg     BLOOD,UA 04/26/2021 Trace     POCT URINE PROTEIN 04/26/2021 Neg     SL AMB POCT UROBILINOGEN 04/26/2021 0 2          Radiology Results:   No results found

## 2021-05-04 ENCOUNTER — TELEPHONE (OUTPATIENT)
Dept: GASTROENTEROLOGY | Facility: AMBULARY SURGERY CENTER | Age: 76
End: 2021-05-04

## 2021-05-04 DIAGNOSIS — K86.89 PANCREATIC INSUFFICIENCY: Primary | ICD-10-CM

## 2021-05-04 NOTE — TELEPHONE ENCOUNTER
LVM explaining provider recommendations  She is to stay on current regimen until new medication arrives   Instructed to call if has further questions

## 2021-05-04 NOTE — TELEPHONE ENCOUNTER
Pt had office visit with Dr Justino Tellez 5/4/21    Prescribed Zenpep [id number] units  However pt was informed by pharmacist this medication would cost her $304 which she cannot afford  Pt prefers this medication to be ordered as creon as this is what her insurance approved prior  Unsure if creon even comes in the same dosing as Zenpep  Advised pt to continue her prior creon regimen until issue is resolved  Per pt, she will need a mail order script sent since it is to costly to  in pharmacy   Please advise

## 2021-05-04 NOTE — TELEPHONE ENCOUNTER
Patients GI provider:  Dr Dionicio Wilson     Number to return call: [phone]     Reason for call: Pt calling requesting to speak with someone to get clarification on dosage of creon    Scheduled procedure/appointment date if applicable: Apt/procedure n/a

## 2021-05-04 NOTE — TELEPHONE ENCOUNTER
Her dose on zenpep was about 81385 units 3 times a day; the closest I could get with creon is about 60784 units x 3 times a day (this is 3 tablets with each meal)  I sent this in to her mail order pharm  If this does not help her symptoms, we can increase this to 4 tablets (71129 units) x 3 times a day

## 2021-05-06 ENCOUNTER — HOSPITAL ENCOUNTER (OUTPATIENT)
Dept: RADIOLOGY | Facility: HOSPITAL | Age: 76
Discharge: HOME | End: 2021-05-06
Payer: MEDICARE

## 2021-05-06 DIAGNOSIS — K86.9 PANCREATIC LESION: ICD-10-CM

## 2021-05-06 PROCEDURE — 74183 MRI ABD W/O CNTR FLWD CNTR: CPT

## 2021-05-06 PROCEDURE — A9585 GADOBUTROL INJECTION: HCPCS | Performed by: PHYSICIAN ASSISTANT

## 2021-05-06 PROCEDURE — G1004 CDSM NDSC: HCPCS

## 2021-05-06 RX ADMIN — GADOBUTROL 7 ML: 604.72 INJECTION INTRAVENOUS at 18:44

## 2021-05-12 ENCOUNTER — TELEPHONE (OUTPATIENT)
Dept: GASTROENTEROLOGY | Facility: CLINIC | Age: 76
End: 2021-05-12

## 2021-05-13 ENCOUNTER — TELEPHONE (OUTPATIENT)
Dept: GASTROENTEROLOGY | Facility: MEDICAL CENTER | Age: 76
End: 2021-05-13

## 2021-05-13 NOTE — TELEPHONE ENCOUNTER
----- Message from Rakel White PA-C sent at 5/12/2021 10:36 AM EDT -----  MRI shows stable pancreatic lesion  Also with liver lesion likely hemangioma which is benign  Radiology recommends repeating in 2 yrs  Please enter reminder   Chantale Merritt

## 2021-05-14 DIAGNOSIS — E78.2 MIXED HYPERLIPIDEMIA: ICD-10-CM

## 2021-05-14 RX ORDER — ATORVASTATIN CALCIUM 10 MG/1
5 TABLET, FILM COATED ORAL DAILY
Qty: 45 TABLET | Refills: 1 | Status: SHIPPED | OUTPATIENT
Start: 2021-05-14 | End: 2021-09-30 | Stop reason: SDUPTHER

## 2021-05-14 NOTE — TELEPHONE ENCOUNTER
Requested Prescriptions     Pending Prescriptions Disp Refills    atorvastatin (LIPITOR) 10 mg tablet 45 tablet 1     Sig: Take 0 5 tablets (5 mg total) by mouth daily       4/26/2021 7/7/2021

## 2021-05-21 NOTE — PROGRESS NOTES
Assessment and Plan:   Patient is a 13-year-old female with reported history of RA, OA and fibromyalgia, previously following with a rheumatologist in [place], who presents for rheumatology consult  She does not recall any of the medication she was treated with we do not have any records  We reviewed her history and her main complaints today are regarding her chronic back pain which is due to severe scoliosis, bilateral hand and foot pain with associated numbness and burning in her feet  She does describe morning stiffness in the hands for only about 15 minutes before this resolves  She does not describe typical features for an underlying inflammatory arthritis like rheumatoid which we discussed  Her symptoms do sound suggestive of possible neuropathy and I advised her to discuss that with her pain management doctor and also her family doctor  She may benefit from evaluation with an EMG study if our rheumatologic evaluation is negative  She will get the blood work and x-rays completed we will follow-up to review and reassessed  Current suspicion is low however for rheumatoid as she has no findings on exam of RA deformities or synovitis  Plan:  Diagnoses and all orders for this visit:    Polyarthralgia  -     Chronic Hepatitis Panel  -     RAUL Screen w/ Reflex to Titer/Pattern  -     C-reactive protein  -     Cyclic citrul peptide antibody, IgG  -     Sedimentation rate, automated  -     Sjogren's Antibodies  -     RF Screen w/ Reflex to Titer  -     XR hand 3+ vw right; Future  -     XR hand 3+ vw left;  Future    Stress fracture, left ankle, initial encounter for fracture    Primary osteoarthritis involving multiple joints    Rheumatoid arthritis, involving unspecified site, unspecified whether rheumatoid factor present ([facility] [address] )  -     Ambulatory referral to Rheumatology    Encounter for screening for other viral diseases   -     Chronic Hepatitis Panel    Other orders  -     Cancel: DXA bone density spine hip and pelvis; Future        Follow-up plan: 8 weeks       HPI  Mireille Greene is a 66 y o   female with hyperlipidemia, depression, anxiety, 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, chronic pancreatitis due to pancreatic lesion, hypertension, insomnia, cervical and lumbar spondylosis, s/p cervical fusion C5-7, s/p right TKA, s/p right THR, s/p left shoulder replacement, thoracolumbar scoliosis, multilevel spine DJD, h/o breast cancer s/p B/L mastectomy and chemo 2007, who presents for rheumatology consult by request of Dr Yosi Randhawa for h/o RA  Patient reports that she moved to this area from [place], while Living there she was diagnosed with various things including RA, OA, and fibromyalgia  We do not have any of the previous rheumatologic records and she cannot recall any specific names of medications she was treated with  She has joint pain in her feet and hands  She has chronic back pain due to severe scoliosis and does have history also of the cervical spine fusion  In the morning she can barely open her fingers  She exercises them  This improves after about 15-20 minutes  No prolonged stiffness  No obvious joint swelling  Her toes "burn and feel numb "   The right toes 3-5 cause so much pain she can barely walk, and  Left toes 3-5 are numb  She wonders if she has neuropathy  She does follow with a spine and Pain Management doctor  She has chronic dry eyes and dry mouth  She reports white color change of her fingers in the cold ever since moving to this area from [place]  Denies photosensitivity, rashes, psoriasis, oral or nasal ulcers, alopecia, Raynaud's, h/o pericarditis or pleurisy, h/o blood clots  Review of Systems  Review of Systems   Constitutional: Negative for chills, fatigue, fever and unexpected weight change  HENT: Negative for mouth sores and trouble swallowing  +dry mouth   Eyes: Negative for pain and visual disturbance          +dry eyes   Respiratory: Negative for cough and shortness of breath  Cardiovascular: Negative for chest pain and leg swelling  Gastrointestinal: Negative for abdominal pain, blood in stool, constipation, diarrhea and nausea  Musculoskeletal: Positive for arthralgias and back pain  Negative for joint swelling and myalgias  Skin: Positive for color change  Negative for rash  Neurological: Positive for numbness  Negative for weakness  Hematological: Negative for adenopathy  Psychiatric/Behavioral: Negative for sleep disturbance         Allergies  Allergies   Allergen Reactions    Dorzolamide Hcl-Timolol Mal Other (See Comments)     Tongue burning    Doxazosin Shortness Of Breath    Gabapentin Other (See Comments)    Lexapro [Escitalopram] Tremor    Lorazepam Hallucinations    Zoloft [Sertraline] Other (See Comments)     Diaphoresis and weight gain      Amlodipine Itching     Excessive wt gain    Amoxicillin-Pot Clavulanate Diarrhea    Buprenorphine Hcl Hallucinations    Carbidopa      Severe muscle spasms    Celecoxib Diarrhea    Chlorthalidone Edema     Itchiness, facial swelling, rash    Clindamycin Diarrhea    Clonidine Other (See Comments)     Burning mouth/tongue    Denosumab     Doxycycline Diarrhea and Nausea Only    Fosamax [Alendronate]      Severe stomach pain    Hydrocodone Hallucinations    Hydrocodone-Acetaminophen Hallucinations    Ibandronic Acid      Acid reflux    Levodopa      svere muscle spasms    Milnacipran      Extreme cold feeling    Pregabalin      sedation    Proline      Arthralgia myalgia    Ropinirole Headache    Rosuvastatin Itching    Tramadol Nausea Only     Severe stomach pain    Trazodone Other (See Comments)     Worsening insomnia     Zenpep [Pancrelipase (Lip-Prot-Amyl)] Diarrhea     svere    Cefdinir Diarrhea and Rash    Rotigotine Rash       Home Medications    Current Outpatient Medications:     acetaminophen (TYLENOL) 500 mg tablet, Take 1 tablet (500 mg total) by mouth every 6 (six) hours as needed for mild pain, Disp: 30 tablet, Rfl: 0    aspirin (ECOTRIN LOW STRENGTH) 81 mg EC tablet, Take 81 mg by mouth daily, Disp: , Rfl:     atorvastatin (LIPITOR) 10 mg tablet, Take 0 5 tablets (5 mg total) by mouth daily, Disp: 45 tablet, Rfl: 1    benazepril (LOTENSIN) 40 MG tablet, TAKE 1 TABLET(40 MG) BY MOUTH DAILY, Disp: 90 tablet, Rfl: 1    citalopram (CeleXA) 20 mg tablet, Take 1 tablet (20 mg total) by mouth daily, Disp: 90 tablet, Rfl: 1    Coenzyme Q10 (CO Q-10) 200 MG CAPS, Take 1 tablet by mouth daily, Disp: , Rfl:     Diclofenac Sodium (VOLTAREN) 1 %, APPLY 2 GRAMS EXTERNALLY TO THE AFFECTED AREA FOUR TIMES DAILY, Disp: , Rfl:     dicyclomine (BENTYL) 10 mg capsule, Take 2 capsules (20 mg total) by mouth every 6 (six) hours as needed (Diarrhea, abdominal cramping) Take with food  , Disp: 90 capsule, Rfl: 2    diphenoxylate-atropine (LOMOTIL) 2 5-0 025 mg per tablet, Take 1 tablet by mouth 4 (four) times a day as needed for diarrhea, Disp: 120 tablet, Rfl: 3    hydrOXYzine HCL (ATARAX) 10 mg tablet, Take 1 tablet (10 mg total) by mouth daily at bedtime as needed for anxiety (insomnia), Disp: 30 tablet, Rfl: 3    latanoprost (XALATAN) 0 005 % ophthalmic solution, Administer 1 drop to both eyes daily, Disp: , Rfl:     MELATONIN PO, Take by mouth, Disp: , Rfl:     metoprolol succinate (TOPROL-XL) 50 mg 24 hr tablet, Take 1 tablet (50 mg total) by mouth daily at bedtime, Disp: 90 tablet, Rfl: 1    Multiple Vitamins-Minerals (PRESERVISION/LUTEIN PO), PreserVision Lutein, Disp: , Rfl:     pancrelipase, Lip-Prot-Amyl, (CREON) 24,000 units, Take 3 (three) tablets 3 (three) times daily with each meal, Disp: 120 capsule, Rfl: 3    spironolactone (ALDACTONE) 25 mg tablet, Take 1 tablet (25 mg total) by mouth daily, Disp: 90 tablet, Rfl: 0    vitamin B-12 (VITAMIN B-12) 1,000 mcg tablet, Take by mouth daily, Disp: , Rfl:     Na Sulfate-K Sulfate-Mg Sulf (Suprep Bowel Prep Kit) 17 5-3 13-1 6 GM/177ML SOLN, Take 177 mL by mouth once for 1 dose, Disp: 177 mL, Rfl: 0    Past Medical History  Past Medical History:   Diagnosis Date    Benign paroxysmal positional vertigo due to bilateral vestibular disorder 1/28/2020    Breast cancer ([facility] [address] ) 2007    Bilateral mastectomy with chemotherapy    Chronic maxillary sinusitis 7/20/2020    Gastroesophageal reflux disease without esophagitis 1/28/2020    Hypertension     Hypertensive disorder 5/2/2019    Pancreatitis     PONV (postoperative nausea and vomiting)     Popliteal cyst, left 6/30/2020    PUD (peptic ulcer disease) 1/28/2020    Scoliosis     Skin cancer        Past Surgical History   Past Surgical History:   Procedure Laterality Date    BASAL CELL CARCINOMA EXCISION      nose    BREAST BIOPSY      BREAST IMPLANT Bilateral     BREAST SURGERY      double mastectomy    CARPAL TUNNEL RELEASE      CATARACT EXTRACTION      DILATION AND CURETTAGE OF UTERUS      GANGLION CYST EXCISION Left     wrist    HERNIA REPAIR      HIP SURGERY      HYSTERECTOMY      KNEE ARTHROSCOPY      KNEE SURGERY      MASTECTOMY Bilateral 2007    NASAL ENDOSCOPY W/ BALLON SINUPLASTY      x4     PLANTAR FASCIA SURGERY Bilateral 1990    [place] NASAL/SINUS ENDOSCOPY,RMV TISS MAXILL SINUS Right 7/27/2020    Procedure: FUNCTIONAL ENDOSCOPIC SINUS SURGERY (FESS) IMAGED GUIDED, MAXILLARY ANTROSTOM Y  ;SEPTOPLASTY;  Surgeon: Des Lou MD;  Location: [facility];  Service: ENT    [address] Bilateral 2004 2005    TOTAL ABDOMINAL HYSTERECTOMY      w RSO    TRIGGER FINGER RELEASE      TUBAL LIGATION         Family History  No known family history of autoimmune or inflammatory diseases      Family History   Problem Relation Age of Onset    No Known Problems Mother     No Known Problems Father     No Known Problems Maternal Grandmother     No Known Problems Maternal Grandfather     No Known Problems Paternal Grandmother     No Known Problems Paternal Grandfather        Social History  Social History     Substance and Sexual Activity   Alcohol Use Not Currently     Social History     Substance and Sexual Activity   Drug Use Never     Social History     Tobacco Use   Smoking Status Former Smoker    Types: Cigarettes    Quit date: 36    Years since quittin 4   Smokeless Tobacco Never Used       Objective:    Vitals:    21 1305   Pulse: 74   Weight: 63 kg (138 lb 14 2 oz)   Height: 4' 11" (1 499 m)       Physical Exam  Constitutional:       General: She is not in acute distress  Appearance: She is well-developed  HENT:      Head: Normocephalic and atraumatic  Eyes:      General: Lids are normal  No scleral icterus  Conjunctiva/sclera: Conjunctivae normal    Neck:      Musculoskeletal: Neck supple  Pulmonary:      Effort: Pulmonary effort is normal  No tachypnea, accessory muscle usage or respiratory distress  Musculoskeletal:      Comments: No joint swelling or synovitis anywhere  Bony hypertrophy noted through the 2nd and 3rd MCP joints suggestive of OA  Skin:     General: Skin is dry  Findings: No rash  Neurological:      Mental Status: She is alert  Psychiatric:         Behavior: Behavior normal  Behavior is cooperative  Imaging:   XR left foot 21:  Images personally reviewed in PACS and my impression is:  +OA, No erosive or inflammatory changes noted      Labs:   Component      Latest Ref Rng & Units 3/17/2021   WBC      4 31 - 10 16 Thousand/uL 5 90   Red Blood Cell Count      3 81 - 5 12 Million/uL 4 45   Hemoglobin      11 5 - 15 4 g/dL 13 1   HCT      34 8 - 46 1 % 43 2   MCV      82 - 98 fL 97   MCH      26 8 - 34 3 pg 29 4   MCHC      31 4 - 37 4 g/dL 30 3 (L)   RDW      11 6 - 15 1 % 12 7   MPV      8 9 - 12 7 fL 10 5   Platelet Count      893 - 390 Thousands/uL 280   nRBC      /100 WBCs 0   Neutrophils %      43 - 75 % 57   Immat GRANS %      0 - 2 % 0   Lymphocytes Relative      14 - 44 % 22   Monocytes Relative      4 - 12 % 13 (H)   Eosinophils      0 - 6 % 7 (H)   Basophils Relative      0 - 1 % 1   Absolute Neutrophils      1 85 - 7 62 Thousands/µL 3 37   Immature Grans Absolute      0 00 - 0 20 Thousand/uL 0 02   Lymphocytes Absolute      0 60 - 4 47 Thousands/µL 1 29   Absolute Monocytes      0 17 - 1 22 Thousand/µL 0 74   Absolute Eosinophils      0 00 - 0 61 Thousand/µL 0 40   Basophils Absolute      0 00 - 0 10 Thousands/µL 0 08   Sodium      136 - 145 mmol/L 140   Potassium      3 5 - 5 3 mmol/L 4 8   Chloride      100 - 108 mmol/L 110 (H)   CO2      21 - 32 mmol/L 26   Anion Gap      4 - 13 mmol/L 4   BUN      5 - 25 mg/dL 15   Creatinine      0 60 - 1 30 mg/dL 1 21   GLUCOSE FASTING      65 - 99 mg/dL 92   Calcium      8 3 - 10 1 mg/dL 9 4   AST      5 - 45 U/L 24   ALT      12 - 78 U/L 38   Alkaline Phosphatase      46 - 116 U/L 97   Total Protein      6 4 - 8 2 g/dL 7 4   Albumin      3 5 - 5 0 g/dL 4 1   TOTAL BILIRUBIN      0 20 - 1 00 mg/dL 0 62   eGFR      ml/min/1 73sq m 43     Component      Latest Ref Rng & Units 8/25/2020   HEPATITIS C ANTIBODY      Non-reactive Non-reactive

## 2021-05-25 ENCOUNTER — OFFICE VISIT (OUTPATIENT)
Dept: RHEUMATOLOGY | Facility: CLINIC | Age: 76
End: 2021-05-25
Payer: MEDICARE

## 2021-05-25 ENCOUNTER — TELEPHONE (OUTPATIENT)
Dept: GASTROENTEROLOGY | Facility: CLINIC | Age: 76
End: 2021-05-25

## 2021-05-25 VITALS — HEIGHT: 59 IN | WEIGHT: 138.89 LBS | BODY MASS INDEX: 28 KG/M2 | HEART RATE: 74 BPM

## 2021-05-25 DIAGNOSIS — M25.50 POLYARTHRALGIA: Primary | ICD-10-CM

## 2021-05-25 DIAGNOSIS — M06.9 RHEUMATOID ARTHRITIS, INVOLVING UNSPECIFIED SITE, UNSPECIFIED WHETHER RHEUMATOID FACTOR PRESENT (HCC): ICD-10-CM

## 2021-05-25 DIAGNOSIS — Z11.59 ENCOUNTER FOR SCREENING FOR OTHER VIRAL DISEASES: ICD-10-CM

## 2021-05-25 DIAGNOSIS — M15.9 PRIMARY OSTEOARTHRITIS INVOLVING MULTIPLE JOINTS: ICD-10-CM

## 2021-05-25 DIAGNOSIS — M84.372A STRESS FRACTURE, LEFT ANKLE, INITIAL ENCOUNTER FOR FRACTURE: ICD-10-CM

## 2021-05-25 PROCEDURE — 99205 OFFICE O/P NEW HI 60 MIN: CPT | Performed by: INTERNAL MEDICINE

## 2021-05-28 ENCOUNTER — HOSPITAL ENCOUNTER (OUTPATIENT)
Dept: RADIOLOGY | Facility: IMAGING CENTER | Age: 76
Discharge: HOME | End: 2021-05-28
Payer: MEDICARE

## 2021-05-28 ENCOUNTER — APPOINTMENT (OUTPATIENT)
Dept: LAB | Facility: IMAGING CENTER | Age: 76
End: 2021-05-28
Payer: MEDICARE

## 2021-05-28 DIAGNOSIS — M25.50 POLYARTHRALGIA: ICD-10-CM

## 2021-05-28 LAB
CRP SERPL QL: <3 MG/L
ERYTHROCYTE [SEDIMENTATION RATE] IN BLOOD: 24 MM/HOUR (ref 0–29)
HBV CORE AB SER QL: NORMAL
HBV CORE IGM SER QL: NORMAL
HBV SURFACE AG SER QL: NORMAL
HCV AB SER QL: NORMAL

## 2021-05-28 PROCEDURE — 86430 RHEUMATOID FACTOR TEST QUAL: CPT | Performed by: INTERNAL MEDICINE

## 2021-05-28 PROCEDURE — 86235 NUCLEAR ANTIGEN ANTIBODY: CPT | Performed by: INTERNAL MEDICINE

## 2021-05-28 PROCEDURE — 86704 HEP B CORE ANTIBODY TOTAL: CPT | Performed by: INTERNAL MEDICINE

## 2021-05-28 PROCEDURE — 73130 X-RAY EXAM OF HAND: CPT

## 2021-05-28 PROCEDURE — 86200 CCP ANTIBODY: CPT | Performed by: INTERNAL MEDICINE

## 2021-05-28 PROCEDURE — 36415 COLL VENOUS BLD VENIPUNCTURE: CPT | Performed by: INTERNAL MEDICINE

## 2021-05-28 PROCEDURE — 86140 C-REACTIVE PROTEIN: CPT | Performed by: INTERNAL MEDICINE

## 2021-05-28 PROCEDURE — 87340 HEPATITIS B SURFACE AG IA: CPT | Performed by: INTERNAL MEDICINE

## 2021-05-28 PROCEDURE — 86705 HEP B CORE ANTIBODY IGM: CPT | Performed by: INTERNAL MEDICINE

## 2021-05-28 PROCEDURE — 86038 ANTINUCLEAR ANTIBODIES: CPT | Performed by: INTERNAL MEDICINE

## 2021-05-28 PROCEDURE — 85652 RBC SED RATE AUTOMATED: CPT | Performed by: INTERNAL MEDICINE

## 2021-05-28 PROCEDURE — 86803 HEPATITIS C AB TEST: CPT | Performed by: INTERNAL MEDICINE

## 2021-05-29 LAB
ENA SS-A AB SER-ACNC: <0.2 AI (ref 0–0.9)
ENA SS-B AB SER-ACNC: <0.2 AI (ref 0–0.9)
RHEUMATOID FACT SER QL LA: NEGATIVE

## 2021-05-31 LAB — RYE IGE QN: NEGATIVE

## 2021-06-01 LAB — CCP AB SER IA-ACNC: 0.5

## 2021-07-01 ENCOUNTER — APPOINTMENT (OUTPATIENT)
Dept: LAB | Facility: IMAGING CENTER | Age: 76
End: 2021-07-01
Payer: MEDICARE

## 2021-07-01 DIAGNOSIS — I10 ESSENTIAL HYPERTENSION: ICD-10-CM

## 2021-07-01 DIAGNOSIS — E78.2 MIXED HYPERLIPIDEMIA: ICD-10-CM

## 2021-07-01 DIAGNOSIS — E87.5 HYPERKALEMIA: ICD-10-CM

## 2021-07-01 LAB
ALBUMIN SERPL BCP-MCNC: 4.1 G/DL (ref 3.5–5)
ALP SERPL-CCNC: 90 U/L (ref 46–116)
ALT SERPL W P-5'-P-CCNC: 33 U/L (ref 12–78)
ANION GAP SERPL CALCULATED.3IONS-SCNC: 4 MMOL/L (ref 4–13)
AST SERPL W P-5'-P-CCNC: 18 U/L (ref 5–45)
BILIRUB SERPL-MCNC: 0.65 MG/DL (ref 0.2–1)
BUN SERPL-MCNC: 24 MG/DL (ref 5–25)
CALCIUM SERPL-MCNC: 9.9 MG/DL (ref 8.3–10.1)
CHLORIDE SERPL-SCNC: 111 MMOL/L (ref 100–108)
CHOLEST SERPL-MCNC: 203 MG/DL (ref 50–200)
CO2 SERPL-SCNC: 23 MMOL/L (ref 21–32)
CREAT SERPL-MCNC: 1.24 MG/DL (ref 0.6–1.3)
ERYTHROCYTE [DISTWIDTH] IN BLOOD BY AUTOMATED COUNT: 13 % (ref 11.6–15.1)
GFR SERPL CREATININE-BSD FRML MDRD: 42 ML/MIN/1.73SQ M
GLUCOSE P FAST SERPL-MCNC: 95 MG/DL (ref 65–99)
HCT VFR BLD AUTO: 41.9 % (ref 34.8–46.1)
HDLC SERPL-MCNC: 63 MG/DL
HGB BLD-MCNC: 12.7 G/DL (ref 11.5–15.4)
LDLC SERPL CALC-MCNC: 115 MG/DL (ref 0–100)
MCH RBC QN AUTO: 29.7 PG (ref 26.8–34.3)
MCHC RBC AUTO-ENTMCNC: 30.3 G/DL (ref 31.4–37.4)
MCV RBC AUTO: 98 FL (ref 82–98)
NONHDLC SERPL-MCNC: 140 MG/DL
PLATELET # BLD AUTO: 273 THOUSANDS/UL (ref 149–390)
PMV BLD AUTO: 10.8 FL (ref 8.9–12.7)
POTASSIUM SERPL-SCNC: 6.2 MMOL/L (ref 3.5–5.3)
PROT SERPL-MCNC: 8 G/DL (ref 6.4–8.2)
RBC # BLD AUTO: 4.28 MILLION/UL (ref 3.81–5.12)
SODIUM SERPL-SCNC: 138 MMOL/L (ref 136–145)
TRIGL SERPL-MCNC: 124 MG/DL
WBC # BLD AUTO: 5.47 THOUSAND/UL (ref 4.31–10.16)

## 2021-07-01 PROCEDURE — 80053 COMPREHEN METABOLIC PANEL: CPT

## 2021-07-01 PROCEDURE — 36415 COLL VENOUS BLD VENIPUNCTURE: CPT

## 2021-07-01 PROCEDURE — 80061 LIPID PANEL: CPT

## 2021-07-01 PROCEDURE — 85027 COMPLETE CBC AUTOMATED: CPT

## 2021-07-07 ENCOUNTER — APPOINTMENT (OUTPATIENT)
Dept: LAB | Facility: IMAGING CENTER | Age: 76
End: 2021-07-07
Payer: MEDICARE

## 2021-07-07 ENCOUNTER — OFFICE VISIT (OUTPATIENT)
Dept: INTERNAL MEDICINE CLINIC | Facility: CLINIC | Age: 76
End: 2021-07-07
Payer: MEDICARE

## 2021-07-07 VITALS
BODY MASS INDEX: 27.82 KG/M2 | HEART RATE: 68 BPM | WEIGHT: 138 LBS | DIASTOLIC BLOOD PRESSURE: 94 MMHG | OXYGEN SATURATION: 98 % | HEIGHT: 59 IN | RESPIRATION RATE: 20 BRPM | SYSTOLIC BLOOD PRESSURE: 158 MMHG | TEMPERATURE: 98.3 F

## 2021-07-07 DIAGNOSIS — E87.5 HYPERKALEMIA: ICD-10-CM

## 2021-07-07 DIAGNOSIS — I65.21 STENOSIS OF RIGHT CAROTID ARTERY: ICD-10-CM

## 2021-07-07 DIAGNOSIS — F33.9 DEPRESSION, RECURRENT (HCC): ICD-10-CM

## 2021-07-07 DIAGNOSIS — E78.2 MIXED HYPERLIPIDEMIA: ICD-10-CM

## 2021-07-07 DIAGNOSIS — I10 ESSENTIAL HYPERTENSION: Primary | ICD-10-CM

## 2021-07-07 DIAGNOSIS — K90.9 DIARRHEA DUE TO MALABSORPTION: ICD-10-CM

## 2021-07-07 DIAGNOSIS — R19.7 DIARRHEA DUE TO MALABSORPTION: ICD-10-CM

## 2021-07-07 DIAGNOSIS — I10 ESSENTIAL HYPERTENSION: ICD-10-CM

## 2021-07-07 DIAGNOSIS — F34.1 DYSTHYMIA: ICD-10-CM

## 2021-07-07 DIAGNOSIS — F41.9 ANXIETY: ICD-10-CM

## 2021-07-07 DIAGNOSIS — M81.0 AGE-RELATED OSTEOPOROSIS WITHOUT CURRENT PATHOLOGICAL FRACTURE: ICD-10-CM

## 2021-07-07 LAB
ANION GAP SERPL CALCULATED.3IONS-SCNC: 4 MMOL/L (ref 4–13)
BUN SERPL-MCNC: 35 MG/DL (ref 5–25)
CALCIUM SERPL-MCNC: 9.5 MG/DL (ref 8.3–10.1)
CHLORIDE SERPL-SCNC: 111 MMOL/L (ref 100–108)
CO2 SERPL-SCNC: 21 MMOL/L (ref 21–32)
CREAT SERPL-MCNC: 1.36 MG/DL (ref 0.6–1.3)
GFR SERPL CREATININE-BSD FRML MDRD: 38 ML/MIN/1.73SQ M
GLUCOSE P FAST SERPL-MCNC: 93 MG/DL (ref 65–99)
POTASSIUM SERPL-SCNC: 5.9 MMOL/L (ref 3.5–5.3)
SODIUM SERPL-SCNC: 136 MMOL/L (ref 136–145)

## 2021-07-07 PROCEDURE — 99214 OFFICE O/P EST MOD 30 MIN: CPT | Performed by: INTERNAL MEDICINE

## 2021-07-07 PROCEDURE — 36415 COLL VENOUS BLD VENIPUNCTURE: CPT

## 2021-07-07 PROCEDURE — 80048 BASIC METABOLIC PNL TOTAL CA: CPT

## 2021-07-07 RX ORDER — VENLAFAXINE HYDROCHLORIDE 75 MG/1
75 CAPSULE, EXTENDED RELEASE ORAL
Qty: 30 CAPSULE | Refills: 3 | Status: SHIPPED | OUTPATIENT
Start: 2021-07-07 | End: 2021-10-11

## 2021-07-07 RX ORDER — LOSARTAN POTASSIUM 100 MG/1
100 TABLET ORAL DAILY
Qty: 30 TABLET | Refills: 2 | Status: SHIPPED | OUTPATIENT
Start: 2021-07-07 | End: 2021-07-07

## 2021-07-07 RX ORDER — LOSARTAN POTASSIUM 100 MG/1
TABLET ORAL
Qty: 90 TABLET | Refills: 0 | Status: SHIPPED | OUTPATIENT
Start: 2021-07-07 | End: 2021-09-30 | Stop reason: ALTCHOICE

## 2021-07-07 NOTE — ASSESSMENT & PLAN NOTE
Discontinue Celexa due to reported side effects  She would like to switch to a different medication  She has multiple SSRI side effects/intolerance is  Will defer on referring to Psychiatry at this time  Will wean off Celexa by taking 20 mg every other day for 1 week  Will start Effexor 75 mg daily  Re-evaluate 1 month

## 2021-07-07 NOTE — ASSESSMENT & PLAN NOTE
Uncontrolled  She has multiple drug allergies/intolerance is to which it has been difficult to find an antihypertensive regimen that controlled her blood pressure  She reports that her blood pressures are well controlled at home, average readings of 120-130s  Today her blood pressure is elevated however this is likely due to emotional distress due to diarrhea  She did cut down her metoprolol succinate from 50 mg daily to 25 mg daily as well as decreased her spironolactone from 25 mg daily to 12 5 mg daily both due to concern of potential adverse effects (fatigue, diarrhea, weight gain)  Her recent CMP showed a potassium of 6 2  She has a pending BMP that was drawn earlier today to which I will follow-up  She also reports a nonproductive cough with benazepril  Plan will be to refer to Cardiology for assistance with controlling her blood pressure given reported multiple drug adverse effects/intolerance  Will discontinue benazepril, start losartan 100 mg daily  Advised she increase metoprolol back up to 50 mg daily  Will follow-up BMP in regards to restarting spironolactone

## 2021-07-07 NOTE — ASSESSMENT & PLAN NOTE
She has  Creon, dicyclomine, and Lomotil due to lack of improvement of symptoms  She has an appointment with her gastroenterologist next Friday  Her diarrhea has cause significant emotional distress to the point where she has expressed suicidal thoughts  Other than her diarrhea and burden that it has caused on her life, she has no other emotional stressors

## 2021-07-07 NOTE — PROGRESS NOTES
Assessment/Plan:    Diarrhea due to malabsorption  She has  Creon, dicyclomine, and Lomotil due to lack of improvement of symptoms  She has an appointment with her gastroenterologist next Friday  Her diarrhea has cause significant emotional distress to the point where she has expressed suicidal thoughts  Other than her diarrhea and burden that it has caused on her life, she has no other emotional stressors  Essential hypertension  Uncontrolled  She has multiple drug allergies/intolerance is to which it has been difficult to find an antihypertensive regimen that controlled her blood pressure  She reports that her blood pressures are well controlled at home, average readings of 120-130s  Today her blood pressure is elevated however this is likely due to emotional distress due to diarrhea  She did cut down her metoprolol succinate from 50 mg daily to 25 mg daily as well as decreased her spironolactone from 25 mg daily to 12 5 mg daily both due to concern of potential adverse effects (fatigue, diarrhea, weight gain)  Her recent CMP showed a potassium of 6 2  She has a pending BMP that was drawn earlier today to which I will follow-up  She also reports a nonproductive cough with benazepril  Plan will be to refer to Cardiology for assistance with controlling her blood pressure given reported multiple drug adverse effects/intolerance  Will discontinue benazepril, start losartan 100 mg daily  Advised she increase metoprolol back up to 50 mg daily  Will follow-up BMP in regards to restarting spironolactone  Stenosis of right carotid artery  Will recheck carotid duplex  Osteoporosis  DEXA scan scheduled for later this month  Hyperlipidemia  Continue atorvastatin 5 mg daily  Anxiety  Discontinue Celexa due to reported side effects  She would like to switch to a different medication  She has multiple SSRI side effects/intolerance is  Will defer on referring to Psychiatry at this time    Will wean off Celexa by taking 20 mg every other day for 1 week  Will start Effexor 75 mg daily  Re-evaluate 1 month  Hyperkalemia  Will follow-up BMP  Diagnoses and all orders for this visit:    Essential hypertension  -     Ambulatory referral to Cardiology; Future  -     losartan (COZAAR) 100 MG tablet; Take 1 tablet (100 mg total) by mouth daily    Diarrhea due to malabsorption    Stenosis of right carotid artery  -     VAS carotid complete study; Future    Anxiety  -     venlafaxine (EFFEXOR-XR) 75 mg 24 hr capsule; Take 1 capsule (75 mg total) by mouth daily with breakfast    Dysthymia  -     venlafaxine (EFFEXOR-XR) 75 mg 24 hr capsule; Take 1 capsule (75 mg total) by mouth daily with breakfast    Depression, recurrent (HCC)    Age-related osteoporosis without current pathological fracture    Mixed hyperlipidemia    Hyperkalemia          BMI Counseling: Body mass index is 27 87 kg/m²  The BMI is above normal  Nutrition recommendations include encouraging healthy choices of fruits and vegetables, decreasing fast food intake, consuming healthier snacks, limiting drinks that contain sugar, moderation in carbohydrate intake, increasing intake of lean protein, reducing intake of saturated and trans fat and reducing intake of cholesterol  Exercise recommendations include moderate physical activity 150 minutes/week and exercising 3-5 times per week  No pharmacotherapy was ordered  Patient referred to PCP due to patient being overweight  Depression Screening and Follow-up Plan: Patient's depression screening was positive with a PHQ-2 score of 5  Their PHQ-9 score was 17  Continue regular follow-up with their mental health provider who is managing their mental health condition(s)  Patient advised to follow-up with PCP for further management  Falls Plan of Care: balance, strength, and gait training instructions were provided  Medications that increase falls were reviewed   Vitamin D supplementation was recommended  Subjective:      Patient ID: Sobeida Miranda is a 66 y o  female  Chief Complaint   Patient presents with    Follow-up     3 month, BW done 7/1 and BMP done today and in process    health maintenance     AWV due after 8/18/21, fall risk    Nausea     most days feeling nauseated    Diarrhea     severe diarrhea on and off and goes back to a GI Dr next friday  Cant go anywhere because of having the diarrhea and it gets her depressed  She has to stop eatting the day before if she wants to or has to go anywhere    Fatigue     very fatigued    Numbness     both feet the toes too the ball of her feet are numb, burning and tingling    Hand Pain     fingers on both hands are numb, burning, tingling and locking up, has tremors on the left hand and she has to put weight or her other hand on it to stop it from shaking    Depression     from not being able to go out places without running none stop to the bathroom  PHQ9 FAILED    corodid doppler     due for doppler after 7/11/21 she had it 2 years ago in [address] [place] [address] 153 Hypertension     elevated today       66year old female is seen today for follow up of chronic conditions  Recent laboratory studies reviewed today, lipid panel and CBC are within acceptable range  CMP does show potassium of 6 2  She had a BMP ordered which was drawn earlier today and is pending  She has been experiencing persistent and worsening diarrhea  She was previously on Lomotil, Creon, and dicyclomine without improvement of her diarrhea  She sees a gastroenterologist regularly and has a scheduled appointment next Friday  The diarrhea has gone to the point where she has frequent accidents, needs to wear pads all the time due to these accidents, and does not he for 12 hours prior to whenever she has to leave her house  This has caused significant emotional distress to the point where she has had suicidal thoughts by taking all of her pills at once    She has not acted out on these thoughts  Other than the diarrhea, she denies any other emotional stressors  She is tearful during today's appointment due to the burden and emotional distress that the diarrhea has caused her  She has also noticed increase in weight recently and is concerned that this may be potential adverse effects of multiple medications, particularly spironolactone, metoprolol, Celexa, atorvastatin  She has a long list of drug allergies/intolerance is to which I feel most are likely side effects/intolerance less so than being actual medication allergies  Nausea  Associated symptoms include fatigue and nausea  Pertinent negatives include no abdominal pain, chest pain, chills, congestion, coughing, diaphoresis, fever, headaches, numbness, sore throat, vomiting or weakness  Diarrhea   This is a chronic problem  The current episode started more than 1 year ago  The problem occurs more than 10 times per day  The problem has been unchanged  The stool consistency is described as watery  Pertinent negatives include no abdominal pain, chills, coughing, fever, headaches or vomiting  She has tried anti-motility drug, change of diet and increased fluids for the symptoms  The treatment provided no relief  Fatigue  This is a chronic problem  The current episode started more than 1 year ago  The problem occurs daily  The problem has been unchanged  Associated symptoms include fatigue and nausea  Pertinent negatives include no abdominal pain, chest pain, chills, congestion, coughing, diaphoresis, fever, headaches, numbness, sore throat, vomiting or weakness  Depression  This is a new problem  The current episode started more than 1 month ago  The problem occurs constantly  The problem has been rapidly worsening  Associated symptoms include fatigue and nausea   Pertinent negatives include no abdominal pain, chest pain, chills, congestion, coughing, diaphoresis, fever, headaches, numbness, sore throat, vomiting or weakness  Hypertension  This is a chronic problem  The current episode started more than 1 year ago  The problem is uncontrolled  Pertinent negatives include no chest pain, headaches, palpitations or shortness of breath  Past treatments include ACE inhibitors, beta blockers and diuretics  Compliance problems include medication side effects  The following portions of the patient's history were reviewed and updated as appropriate: allergies, current medications, past family history, past medical history, past social history, past surgical history and problem list     Review of Systems   Constitutional: Positive for fatigue  Negative for activity change, appetite change, chills, diaphoresis and fever  HENT: Negative for congestion, postnasal drip, rhinorrhea, sinus pressure, sinus pain, sneezing and sore throat  Eyes: Negative for visual disturbance  Respiratory: Negative for apnea, cough, choking, chest tightness, shortness of breath and wheezing  Cardiovascular: Negative for chest pain, palpitations and leg swelling  Gastrointestinal: Positive for diarrhea and nausea  Negative for abdominal distention, abdominal pain, anal bleeding, blood in stool, constipation and vomiting  Endocrine: Negative for cold intolerance and heat intolerance  Genitourinary: Negative for difficulty urinating, dysuria and hematuria  Musculoskeletal: Negative  Skin: Negative  Neurological: Negative for dizziness, weakness, light-headedness, numbness and headaches  Hematological: Negative for adenopathy  Psychiatric/Behavioral: Positive for depression  Negative for agitation, sleep disturbance and suicidal ideas  All other systems reviewed and are negative          Past Medical History:   Diagnosis Date    Benign paroxysmal positional vertigo due to bilateral vestibular disorder 1/28/2020    Breast cancer [facility]) 2007    Bilateral mastectomy with chemotherapy    Chronic maxillary sinusitis 7/20/2020    Gastroesophageal reflux disease without esophagitis 1/28/2020    Hypertension     Hypertensive disorder 5/2/2019    Pancreatitis     PONV (postoperative nausea and vomiting)     Popliteal cyst, left 6/30/2020    PUD (peptic ulcer disease) 1/28/2020    Scoliosis     Skin cancer          Current Outpatient Medications:     acetaminophen (TYLENOL) 500 mg tablet, Take 1 tablet (500 mg total) by mouth every 6 (six) hours as needed for mild pain, Disp: 30 tablet, Rfl: 0    aspirin (ECOTRIN LOW STRENGTH) 81 mg EC tablet, Take 81 mg by mouth daily, Disp: , Rfl:     atorvastatin (LIPITOR) 10 mg tablet, Take 0 5 tablets (5 mg total) by mouth daily, Disp: 45 tablet, Rfl: 1    citalopram (CeleXA) 20 mg tablet, Take 1 tablet (20 mg total) by mouth daily, Disp: 90 tablet, Rfl: 1    Diclofenac Sodium (VOLTAREN) 1 %, APPLY 2 GRAMS EXTERNALLY TO THE AFFECTED AREA FOUR TIMES DAILY, Disp: , Rfl:     latanoprost (XALATAN) 0 005 % ophthalmic solution, Administer 1 drop to both eyes daily, Disp: , Rfl:     MELATONIN PO, Take 5 mg by mouth daily , Disp: , Rfl:     metoprolol succinate (TOPROL-XL) 50 mg 24 hr tablet, Take 1 tablet (50 mg total) by mouth daily at bedtime (Patient taking differently: Take 50 mg by mouth daily at bedtime 25 mg as of 6/28/21 per patient), Disp: 90 tablet, Rfl: 1    Multiple Vitamins-Minerals (PRESERVISION/LUTEIN PO), PreserVision Lutein, Disp: , Rfl:     spironolactone (ALDACTONE) 25 mg tablet, Take 1 tablet (25 mg total) by mouth daily (Patient taking differently: Take 25 mg by mouth daily Taking 12 5 mg as of 6/28/21 per patient), Disp: 90 tablet, Rfl: 0    vitamin B-12 (VITAMIN B-12) 1,000 mcg tablet, Take by mouth daily, Disp: , Rfl:     losartan (COZAAR) 100 MG tablet, Take 1 tablet (100 mg total) by mouth daily, Disp: 30 tablet, Rfl: 2    pancrelipase, Lip-Prot-Amyl, (CREON) 24,000 units, Take 3 (three) tablets 3 (three) times daily with each meal (Patient not taking: Reported on 7/7/2021), Disp: 120 capsule, Rfl: 3    venlafaxine (EFFEXOR-XR) 75 mg 24 hr capsule, Take 1 capsule (75 mg total) by mouth daily with breakfast, Disp: 30 capsule, Rfl: 3    Allergies   Allergen Reactions    Dorzolamide Hcl-Timolol Mal Other (See Comments)     Tongue burning    Doxazosin Shortness Of Breath    Gabapentin Other (See Comments)    Lexapro [Escitalopram] Tremor    Lorazepam Hallucinations    Benazepril Cough    Zoloft [Sertraline] Other (See Comments)     Diaphoresis and weight gain      Amlodipine Itching     Excessive wt gain    Amoxicillin-Pot Clavulanate Diarrhea    Buprenorphine Hcl Hallucinations    Carbidopa      Severe muscle spasms    Celecoxib Diarrhea    Chlorthalidone Edema     Itchiness, facial swelling, rash    Clindamycin Diarrhea    Clonidine Other (See Comments)     Burning mouth/tongue    Denosumab     Doxycycline Diarrhea and Nausea Only    Fosamax [Alendronate]      Severe stomach pain    Hydrocodone Hallucinations    Hydrocodone-Acetaminophen Hallucinations    Ibandronic Acid      Acid reflux    Levodopa      svere muscle spasms    Milnacipran      Extreme cold feeling    Pregabalin      sedation    Proline      Arthralgia myalgia    Ropinirole Headache    Rosuvastatin Itching    Tramadol Nausea Only     Severe stomach pain    Trazodone Other (See Comments)     Worsening insomnia     Zenpep [Pancrelipase (Lip-Prot-Amyl)] Diarrhea     svere    Cefdinir Diarrhea and Rash    Rotigotine Rash       Social History   Past Surgical History:   Procedure Laterality Date    BASAL CELL CARCINOMA EXCISION      nose    BREAST BIOPSY      BREAST IMPLANT Bilateral     BREAST SURGERY      double mastectomy    CARPAL TUNNEL RELEASE      CATARACT EXTRACTION      DILATION AND CURETTAGE OF UTERUS      GANGLION CYST EXCISION Left     wrist    HERNIA REPAIR      HIP SURGERY      HYSTERECTOMY      KNEE ARTHROSCOPY      KNEE SURGERY      MASTECTOMY Bilateral 2007    NASAL ENDOSCOPY W/ BALLON SINUPLASTY      x4     PLANTAR FASCIA SURGERY Bilateral 1990    MS NASAL/SINUS ENDOSCOPY,RMV TISS MAXILL SINUS Right 7/27/2020    Procedure: FUNCTIONAL ENDOSCOPIC SINUS SURGERY (FESS) IMAGED GUIDED, MAXILLARY ANTROSTOM Y  ;SEPTOPLASTY;  Surgeon: Khalif Lion MD;  Location: [facility];  Service: ENT   [address] Bilateral 2004 2005    TOTAL ABDOMINAL HYSTERECTOMY      w RSO    TRIGGER FINGER RELEASE      TUBAL LIGATION       Family History   Problem Relation Age of Onset    No Known Problems Mother     No Known Problems Father     No Known Problems Maternal Grandmother     No Known Problems Maternal Grandfather     No Known Problems Paternal Grandmother     No Known Problems Paternal Grandfather        Objective:  /94 (BP Location: Right arm, Patient Position: Sitting, Cuff Size: Standard)   Pulse 68   Temp 98 3 °F (36 8 °C) (Temporal)   Resp 20   Ht 4' 11" (1 499 m)   Wt 62 6 kg (138 lb)   SpO2 98%   BMI 27 87 kg/m²     Recent Results (from the past 1344 hour(s))   Chronic Hepatitis Panel    Collection Time: 05/28/21 11:03 AM   Result Value Ref Range    Hepatitis B Surface Ag Non-reactive Non-reactive, NonReactive - Confirmed    Hepatitis C Ab Non-reactive Non-reactive    Hep B C IgM Non-reactive Non-reactive    Hep B Core Total Ab Non-reactive Non-reactive   RAUL Screen w/ Reflex to Titer/Pattern    Collection Time: 05/28/21 11:03 AM   Result Value Ref Range    RAUL Negative Negative   C-reactive protein    Collection Time: 05/28/21 11:03 AM   Result Value Ref Range    CRP <3 6 <4 4 mg/L   Cyclic citrul peptide antibody, IgG    Collection Time: 05/28/21 11:03 AM   Result Value Ref Range    Cyclic Citrullinated Peptide Ab  0 5 See comment   Sedimentation rate, automated    Collection Time: 05/28/21 11:03 AM   Result Value Ref Range    Sed Rate 24 0 - 29 mm/hour   Sjogren's Antibodies Collection Time: 05/28/21 11:03 AM   Result Value Ref Range    SS-A (RO) Ab <0 2 0 0 - 0 9 AI    SS-B (LA) Ab <0 2 0 0 - 0 9 AI   RF Screen w/ Reflex to Titer    Collection Time: 05/28/21 11:03 AM   Result Value Ref Range    Rheumatoid Factor Negative Negative   Lipid panel    Collection Time: 07/01/21 11:01 AM   Result Value Ref Range    Cholesterol 203 (H) 50 - 200 mg/dL    Triglycerides 124 <=150 mg/dL    HDL, Direct 63 >=40 mg/dL    LDL Calculated 115 (H) 0 - 100 mg/dL    Non-HDL-Chol (CHOL-HDL) 140 mg/dl   CBC    Collection Time: 07/01/21 11:01 AM   Result Value Ref Range    WBC 5 47 4 31 - 10 16 Thousand/uL    RBC 4 28 3 81 - 5 12 Million/uL    Hemoglobin 12 7 11 5 - 15 4 g/dL    Hematocrit 41 9 34 8 - 46 1 %    MCV 98 82 - 98 fL    MCH 29 7 26 8 - 34 3 pg    MCHC 30 3 (L) 31 4 - 37 4 g/dL    RDW 13 0 11 6 - 15 1 %    Platelets 336 048 - 267 Thousands/uL    MPV 10 8 8 9 - 12 7 fL   Comprehensive metabolic panel    Collection Time: 07/01/21 11:01 AM   Result Value Ref Range    Sodium 138 136 - 145 mmol/L    Potassium 6 2 (H) 3 5 - 5 3 mmol/L    Chloride 111 (H) 100 - 108 mmol/L    CO2 23 21 - 32 mmol/L    ANION GAP 4 4 - 13 mmol/L    BUN 24 5 - 25 mg/dL    Creatinine 1 24 0 60 - 1 30 mg/dL    Glucose, Fasting 95 65 - 99 mg/dL    Calcium 9 9 8 3 - 10 1 mg/dL    AST 18 5 - 45 U/L    ALT 33 12 - 78 U/L    Alkaline Phosphatase 90 46 - 116 U/L    Total Protein 8 0 6 4 - 8 2 g/dL    Albumin 4 1 3 5 - 5 0 g/dL    Total Bilirubin 0 65 0 20 - 1 00 mg/dL    eGFR 42 ml/min/1 73sq m            Physical Exam  Vitals and nursing note reviewed  Constitutional:       General: She is not in acute distress  Appearance: She is well-developed  She is not diaphoretic  HENT:      Head: Normocephalic and atraumatic  Eyes:      General:         Right eye: No discharge  Left eye: No discharge  Conjunctiva/sclera: Conjunctivae normal       Pupils: Pupils are equal, round, and reactive to light     Neck: Thyroid: No thyromegaly  Vascular: No JVD  Cardiovascular:      Rate and Rhythm: Normal rate and regular rhythm  Heart sounds: Normal heart sounds  No murmur heard  No friction rub  No gallop  Pulmonary:      Effort: Pulmonary effort is normal  No respiratory distress  Breath sounds: Normal breath sounds  No wheezing or rales  Chest:      Chest wall: No tenderness  Abdominal:      General: There is no distension  Palpations: Abdomen is soft  Tenderness: There is no abdominal tenderness  Musculoskeletal:         General: No tenderness or deformity  Normal range of motion  Cervical back: Normal range of motion and neck supple  Lymphadenopathy:      Cervical: No cervical adenopathy  Skin:     General: Skin is warm and dry  Coloration: Skin is not pale  Findings: No erythema or rash  Neurological:      Mental Status: She is alert and oriented to person, place, and time  Cranial Nerves: No cranial nerve deficit  Coordination: Coordination normal    Psychiatric:         Behavior: Behavior normal          Thought Content:  Thought content normal          Judgment: Judgment normal

## 2021-07-15 ENCOUNTER — HOSPITAL ENCOUNTER (OUTPATIENT)
Dept: NON INVASIVE DIAGNOSTICS | Facility: HOSPITAL | Age: 76
Discharge: HOME | End: 2021-07-15
Attending: INTERNAL MEDICINE
Payer: MEDICARE

## 2021-07-15 DIAGNOSIS — I65.21 STENOSIS OF RIGHT CAROTID ARTERY: ICD-10-CM

## 2021-07-15 PROCEDURE — 93880 EXTRACRANIAL BILAT STUDY: CPT

## 2021-07-15 PROCEDURE — 93880 EXTRACRANIAL BILAT STUDY: CPT | Performed by: SURGERY

## 2021-07-19 ENCOUNTER — HOSPITAL ENCOUNTER (OUTPATIENT)
Dept: RADIOLOGY | Facility: IMAGING CENTER | Age: 76
Discharge: HOME | End: 2021-07-19
Payer: MEDICARE

## 2021-07-19 DIAGNOSIS — M84.372A STRESS FRACTURE, LEFT ANKLE, INITIAL ENCOUNTER FOR FRACTURE: ICD-10-CM

## 2021-07-19 DIAGNOSIS — M81.0 AGE-RELATED OSTEOPOROSIS WITHOUT CURRENT PATHOLOGICAL FRACTURE: ICD-10-CM

## 2021-07-19 PROCEDURE — 77080 DXA BONE DENSITY AXIAL: CPT

## 2021-07-23 ENCOUNTER — OFFICE VISIT (OUTPATIENT)
Dept: GASTROENTEROLOGY | Facility: CLINIC | Age: 76
End: 2021-07-23
Payer: MEDICARE

## 2021-07-23 VITALS
BODY MASS INDEX: 29.18 KG/M2 | DIASTOLIC BLOOD PRESSURE: 88 MMHG | TEMPERATURE: 98.9 F | HEIGHT: 58 IN | WEIGHT: 139 LBS | SYSTOLIC BLOOD PRESSURE: 149 MMHG

## 2021-07-23 DIAGNOSIS — K86.9 PANCREATIC LESION: Primary | ICD-10-CM

## 2021-07-23 DIAGNOSIS — K86.89 PANCREATIC INSUFFICIENCY: ICD-10-CM

## 2021-07-23 DIAGNOSIS — R19.7 DIARRHEA DUE TO MALABSORPTION: ICD-10-CM

## 2021-07-23 DIAGNOSIS — K90.9 DIARRHEA DUE TO MALABSORPTION: ICD-10-CM

## 2021-07-23 PROCEDURE — 99214 OFFICE O/P EST MOD 30 MIN: CPT | Performed by: INTERNAL MEDICINE

## 2021-07-23 RX ORDER — PHENOL 1.4 %
600 AEROSOL, SPRAY (ML) MUCOUS MEMBRANE 2 TIMES DAILY WITH MEALS
COMMUNITY

## 2021-07-23 RX ORDER — MELATONIN
2000 DAILY
COMMUNITY

## 2021-07-23 NOTE — PATIENT INSTRUCTIONS
Colon scheduled on 10/29 with Dr Dorina Puente at [facility] provided verbal instructions/ paper work given  Prep-Miralax/Dulcolax

## 2021-07-23 NOTE — PROGRESS NOTES
[facility] - Outpatient Follow-up Note  Angelikawinter Vogt 66 y o  female MRN: [id number]  Encounter: [id number]          ASSESSMENT AND PLAN:      1  Pancreatic lesion  2  Pancreatic insufficiency  3  Diarrhea due to malabsorption  Her diarrhea is likely due to a combination of exocrine pancreatic insufficiency and irritable bowel syndrome  I encouraged her to eat a low-fat diet and take Imodium up to 4 times per day  Her recent MRI did not show any increase in size or change in the appearance of her cystic pancreatic lesion  We will plan for repeat imaging in approximately two years  I will have her follow up in three months to reassess her symptoms and adjust the plan as needed  I also encouraged her to schedule are planned colonoscopy to evaluate for microscopic colitis and inflammatory bowel disease     ______________________________________________________________________    SUBJECTIVE:  She presents for follow-up of her cystic pancreatic lesion, pancreatic insufficiency, and diarrhea  She has known pancreatic exocrine insufficiency based on her low fecal elastase but unfortunately she has been unable to tolerate pancreatic enzyme supplementation as both Creon and Zenpep of caused severe diarrhea  She has not had any weight loss, bleeding, or abdominal pain  She denies reflux, nausea, vomiting, and dysphagia  She said citalopram was recently stopped and that seem to help her diarrhea  She feels she will have stretches of a few weeks with diarrhea and then a few weeks without diarrhea and has been unable to  any patterns  I reviewed her labs, MRI, and last PCP note  REVIEW OF SYSTEMS IS OTHERWISE NEGATIVE        Historical Information   Past Medical History:   Diagnosis Date    Benign paroxysmal positional vertigo due to bilateral vestibular disorder 1/28/2020    Breast cancer [facility]) 2007    Bilateral mastectomy with chemotherapy    Chronic maxillary sinusitis 2020    Gastroesophageal reflux disease without esophagitis 2020    Hypertension     Hypertensive disorder 2019    Pancreatitis     PONV (postoperative nausea and vomiting)     Popliteal cyst, left 2020    PUD (peptic ulcer disease) 2020    Scoliosis     Skin cancer      Past Surgical History:   Procedure Laterality Date    BASAL CELL CARCINOMA EXCISION      nose    BREAST BIOPSY      BREAST IMPLANT Bilateral     BREAST SURGERY      double mastectomy    CARPAL TUNNEL RELEASE      CATARACT EXTRACTION      DILATION AND CURETTAGE OF UTERUS      GANGLION CYST EXCISION Left     wrist    HERNIA REPAIR      HIP SURGERY      HYSTERECTOMY      KNEE ARTHROSCOPY      KNEE SURGERY      MASTECTOMY Bilateral     NASAL ENDOSCOPY W/ BALLON SINUPLASTY      x4     PLANTAR FASCIA SURGERY Bilateral     [place] NASAL/SINUS ENDOSCOPY,RMV TISS MAXILL SINUS Right 2020    Procedure: FUNCTIONAL ENDOSCOPIC SINUS SURGERY (FESS) IMAGED GUIDED, MAXILLARY ANTROSTOM Y  ;SEPTOPLASTY;  Surgeon: Perlita Nunez MD;  Location: [facility];  Service: ENT   [address] [place] [address] Bilateral 2004     TOTAL ABDOMINAL HYSTERECTOMY      w RSO    TRIGGER FINGER RELEASE      TUBAL LIGATION       Social History   Social History     Substance and Sexual Activity   Alcohol Use Not Currently     Social History     Substance and Sexual Activity   Drug Use Never     Social History     Tobacco Use   Smoking Status Former Smoker    Types: Cigarettes    Quit date: 34    Years since quittin 5   Smokeless Tobacco Never Used     Family History   Problem Relation Age of Onset    No Known Problems Mother     No Known Problems Father     No Known Problems Maternal Grandmother     No Known Problems Maternal Grandfather     No Known Problems Paternal Grandmother     No Known Problems Paternal Grandfather        Meds/Allergies       Current Outpatient Medications:     acetaminophen (TYLENOL) 500 mg tablet    aspirin (ECOTRIN LOW STRENGTH) 81 mg EC tablet    atorvastatin (LIPITOR) 10 mg tablet    calcium carbonate (OS-QIANA) 600 MG tablet    cholecalciferol (VITAMIN D3) 1,000 units tablet    Diclofenac Sodium (VOLTAREN) 1 %    latanoprost (XALATAN) 0 005 % ophthalmic solution    losartan (COZAAR) 100 MG tablet    MELATONIN PO    metoprolol succinate (TOPROL-XL) 50 mg 24 hr tablet    Multiple Vitamins-Minerals (PRESERVISION/LUTEIN PO)    venlafaxine (EFFEXOR-XR) 75 mg 24 hr capsule    vitamin B-12 (VITAMIN B-12) 1,000 mcg tablet    citalopram (CeleXA) 20 mg tablet    pancrelipase, Lip-Prot-Amyl, (CREON) 24,000 units    spironolactone (ALDACTONE) 25 mg tablet    Allergies   Allergen Reactions    Dorzolamide Hcl-Timolol Mal Other (See Comments)     Tongue burning    Doxazosin Shortness Of Breath    Gabapentin Other (See Comments)    Lexapro [Escitalopram] Tremor    Lorazepam Hallucinations    Benazepril Cough    Zoloft [Sertraline] Other (See Comments)     Diaphoresis and weight gain      Amlodipine Itching     Excessive wt gain    Amoxicillin-Pot Clavulanate Diarrhea    Buprenorphine Hcl Hallucinations    Carbidopa      Severe muscle spasms    Celecoxib Diarrhea    Chlorthalidone Edema     Itchiness, facial swelling, rash    Clindamycin Diarrhea    Clonidine Other (See Comments)     Burning mouth/tongue    Denosumab     Doxycycline Diarrhea and Nausea Only    Fosamax [Alendronate]      Severe stomach pain    Hydrocodone Hallucinations    Hydrocodone-Acetaminophen Hallucinations    Ibandronic Acid      Acid reflux    Levodopa      svere muscle spasms    Milnacipran      Extreme cold feeling    Pregabalin      sedation    Proline      Arthralgia myalgia    Ropinirole Headache    Rosuvastatin Itching    Tramadol Nausea Only     Severe stomach pain    Trazodone Other (See Comments)     Worsening insomnia     Zenpep [Pancrelipase (Lip-Prot-Amyl)] Diarrhea     svere    Cefdinir Diarrhea and Rash    Rotigotine Rash           Objective     Blood pressure 149/88, temperature 98 9 °F (37 2 °C), temperature source Tympanic, height 4' 9 5" (1 461 m), weight 63 kg (139 lb), not currently breastfeeding  Body mass index is 29 56 kg/m²  PHYSICAL EXAM:      General Appearance:   Alert, cooperative, anxious   HEENT:   Normocephalic, atraumatic, anicteric      Neck:  Supple, symmetrical, trachea midline   Lungs:   Clear to auscultation bilaterally; no rales, rhonchi or wheezing; respirations unlabored    Heart[de-identified]   Regular rate and rhythm; no murmur, rub, or gallop  Abdomen:   Soft, non-tender, non-distended; normal bowel sounds; no masses, no organomegaly    Genitalia:   Deferred    Rectal:   Deferred    Extremities:  No cyanosis, clubbing or edema    Pulses:  2+ and symmetric    Skin:  No jaundice, rashes, or lesions    Lymph nodes:  No palpable cervical lymphadenopathy        Lab Results:   No visits with results within 1 Day(s) from this visit  Latest known visit with results is:   Appointment on 07/07/2021   Component Date Value    Sodium 07/07/2021 136     Potassium 07/07/2021 5 9*    Chloride 07/07/2021 111*    CO2 07/07/2021 21     ANION GAP 07/07/2021 4     BUN 07/07/2021 35*    Creatinine 07/07/2021 1 36*    Glucose, Fasting 07/07/2021 93     Calcium 07/07/2021 9 5     eGFR 07/07/2021 38          Radiology Results:   DXA bone density spine hip and pelvis    Result Date: 7/19/2021  Narrative: DXA SCAN CLINICAL HISTORY:  69-year-old female  OTHER RISK FACTORS:  Parental history of hip fracture, on PPI and SSRI  PHARMACOLOGIC THERAPY FOR OSTEOPOROSIS:  None  TECHNIQUE: Bone densitometry was performed using a Hologic QDR-4500  bone densitometer  Regions of interest appear properly placed  COMPARISON: There are no prior DXA studies performed on this unit for comparison   RESULTS: LUMBAR SPINE: Not assessed because scoliosis and generalized spondylosis result in fewer than two evaluable vertebrae  Brauliohumberto Mccracken LEFT  TOTAL HIP: BMD:  0 740  gm/cm2  T-score:  -1 7 LEFT  FEMORAL NECK: BMD:  0 636  gm/cm2 T score: -1 9 LEFT  FOREARM:  33% RADIUS BMD:  0 581  gm/cm2 T-score:  -1 8     Impression: 1  Low bone mass (osteopenia)  3   The 10 year risk of hip fracture is 13% with the 10 year risk of major osteoporotic fracture being 23% as calculated by the [facility]/WHO fracture risk assessment tool (FRAX)  3   The current NOF guidelines recommend treating patients with a T-score of -2 5 or less in the lumbar spine or hips, or in post-menopausal women and men over the age of 48 with low bone mass (osteopenia) and a FRAX 10 year risk score of >3% for hip fracture and/or >20% for major osteoporotic fracture  4   The NOF recommends follow-up DXA in 1-2 years after initiating therapy for osteoporosis and every 2 years thereafter  More frequent evaluation is appropriate for patients with conditions associated with rapid bone loss, such as glucocorticoid therapy  The interval between DXA screenings may be longer for individuals without major risk factors and initial T-score in the normal or upper low bone mass range  The FRAX algorithm has certain limitations: -FRAX has not been validated in patients currently or previously treated with pharmacotherapy for osteoporosis  In such patients, clinical judgment must be exercised in interpreting FRAX scores  -Prior hip, vertebral and humeral fragility fractures appear to confer greater risk of subsequent fracture than fractures at other sites (this is especially true for individuals with severe vertebral fractures), but quantification of this incremental risk is not possible with FRAX  -FRAX underestimates fracture risk in patients with history of multiple fragility fractures   -FRAX may underestimate fracture risk in patients with history of frequent falls  -It is not appropriate to use FRAX to monitor treatment response  WHO CLASSIFICATION: Normal (a T-score of -1 0 or higher) Low bone mineral density (a T-score of less than -1 0 but higher than -2 5) Osteoporosis (a T-score of -2 5 or less) Severe osteoporosis (a T-score of -2 5 or less with a fragility fracture) Workstation performed: [id number]     VAS carotid complete study    Result Date: 7/15/2021  Narrative:  [facility] REPORT CLINICAL: Indications: Yearly surveillance of carotid artery disease  Patient is asymptomatic at this time  Patient recently moved from [place] where she had her first carotid scan done  Operative History: Patient denies any previous cardiovascular surgery Risk Factors The patient has history of HTN, HLD and previous smoking (quit >10yrs ago)  Clinical Right Pressure:  150/90 mm Hg, Left Pressure:  140/82 mm Hg  FINDINGS:  Right        Impression  PSV  EDV (cm/s)  Direction of Flow  Ratio  Dist  ICA                 92          32                      1 46  Mid  ICA                  81          22                      1 29  Prox  ICA    1 - 49%      80          26                      1 28  Dist CCA                  48          14                            Mid CCA                   63          15                      1 00  Prox CCA                  63          14                      0 99  Ext Carotid               98           8                      1 56  Prox Vert                 45          10  Antegrade                 Subclavian               140           0                            Innominate                63           5                             Left         Impression  PSV  EDV (cm/s)  Direction of Flow  Ratio  Dist  ICA                101          28                      1 58  Mid  ICA                  83          24                      1 29  Prox   ICA    1 - 49%      62          16                      0 97  Dist CCA                  56          11                            Mid CCA 64          17                      0 82  Prox CCA                  78          17                            Ext Carotid               62          10                      0 96  Prox Vert                 66          18  Antegrade                 Subclavian                70           5                               CONCLUSION:  Impression RIGHT: There is <50% stenosis noted in the internal carotid artery  Shadowing may be hiding a greater stenosis  Plaque is heterogenous and irregular  Vertebral artery flow is antegrade  There is no significant subclavian artery disease  LEFT: There is <50% stenosis noted in the internal carotid artery  Plaque is heterogenous and irregular  Vertebral artery flow is antegrade  There is no significant subclavian artery disease  No previous study to compare  Internal carotid artery stenosis determination by consensus criteria from: [place] Zapien et al  Carotid Artery Stenosis: Gray-Scale and Doppler US Diagnosis - Society of Radiologists in [address] [place] [address] Drive, Radiology 2003; [phone]    SIGNATURE: Electronically Signed by: Derrek Garcia MD on 2021-07-15 09:56:17 PM

## 2021-07-27 ENCOUNTER — OFFICE VISIT (OUTPATIENT)
Dept: RHEUMATOLOGY | Facility: CLINIC | Age: 76
End: 2021-07-27
Payer: MEDICARE

## 2021-07-27 VITALS
SYSTOLIC BLOOD PRESSURE: 145 MMHG | HEART RATE: 66 BPM | WEIGHT: 139 LBS | DIASTOLIC BLOOD PRESSURE: 85 MMHG | BODY MASS INDEX: 28.02 KG/M2 | HEIGHT: 59 IN

## 2021-07-27 DIAGNOSIS — M15.9 PRIMARY OSTEOARTHRITIS INVOLVING MULTIPLE JOINTS: Primary | ICD-10-CM

## 2021-07-27 PROCEDURE — 99214 OFFICE O/P EST MOD 30 MIN: CPT | Performed by: INTERNAL MEDICINE

## 2021-07-27 NOTE — PROGRESS NOTES
Assessment and Plan:   Patient is a 27-year-old female who presents for rheumatology follow-up regarding reported history of RA  She was following with a rheumatologist when she lived in [place] however we do not have any prior records for review  She reports she was diagnosed with RA at that time and took a medication for this but cannot recall the name more details  We did further workup with labs and x-rays of her hands which showed osteoarthritis but no typical signs of rheumatoid on x-ray, and workup was completely unremarkable  We discussed she likely has joint pain at least at this point from osteoarthritis rather than RA as she again lacks any signs of swelling or synovitis in the joints on exam today  We discussed unfortunately her options are limited for pain control since she cannot take NSAIDs and the safest thing for her is the Tylenol  This does work for her when she needs to take it so she will continue with that  She otherwise does not need Rheumatology follow-up  We did discuss the typical signs and symptoms of rheumatoid that should prompt her to return for re-evaluation  Plan:  Diagnoses and all orders for this visit:    Primary osteoarthritis involving multiple joints        Follow-up plan: no rheumatology follow-up needed       Rheumatic Disease Summary  1  OA  -Rheum eval 5/25/21 for prior dx of RA in [place], no records; c/o chronic joint pain in hands and feet, back pain from scoliosis; describes burning toe pain with numbness/tingling, suspicious for ?neuropathy, no signs of RA on exam  -XR hands 5/28/21: OA, no erosive inflammatory changes   -Labs 5/28/21: neg RAUL, RF, CCP, SSA, SSB, hep panel, normal ESR/CRP  -Visit 7/27/21: no evidence for RA, discussed signs/sx and to return PRN if concerns   2   Comorbidities: hyperlipidemia, depression, anxiety, 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, chronic pancreatitis due to pancreatic lesion, hypertension, insomnia, cervical and lumbar spondylosis, s/p cervical fusion C5-7, s/p right TKA, s/p right THR, s/p left shoulder replacement, thoracolumbar scoliosis, multilevel spine DJD, h/o breast cancer s/p B/L mastectomy and chemo 2007     HPI  Camacho Bernal is a 66 y o   female who presents for rheumatology follow-up for prior diagnosis of RA and OA  She saw a rheumatologist in [place] but we have no records for review  We did rheumatology re-evaluation at last visit as she was reporting a history of RA, but workup was completely unremarkable  Patient reports she is doing about the same since last visit  She continues to have joint pain in her hands  Reports that at last visit she was describing a burning sensation in the hands, but her family doctor took her off 1 of her medications and the burning resolved  She still has pain in the hands but the burning sensation has resolved  We reviewed her workup which did not show any concerns for rheumatoid or another inflammatory arthritis  She was happy to hear this  For her joint pain she takes Tylenol as needed, not on a regular basis as she does not need it on a daily basis  She is unable to take NSAIDs  The following portions of the patient's history were reviewed and updated as appropriate: allergies, current medications, past family history, past medical history, past social history, past surgical history and problem list     Review of Systems:   Review of Systems   Constitutional: Negative for fatigue and unexpected weight change  HENT: Negative for mouth sores  Respiratory: Negative for cough and shortness of breath  Gastrointestinal: Negative for constipation and diarrhea  Musculoskeletal: Positive for arthralgias  Negative for back pain, joint swelling and myalgias  Skin: Negative for color change and rash  Neurological: Negative for weakness  Psychiatric/Behavioral: Negative for sleep disturbance         Home Medications:    Current Outpatient Medications:    acetaminophen (TYLENOL) 500 mg tablet, Take 1 tablet (500 mg total) by mouth every 6 (six) hours as needed for mild pain, Disp: 30 tablet, Rfl: 0    aspirin (ECOTRIN LOW STRENGTH) 81 mg EC tablet, Take 81 mg by mouth daily, Disp: , Rfl:     atorvastatin (LIPITOR) 10 mg tablet, Take 0 5 tablets (5 mg total) by mouth daily, Disp: 45 tablet, Rfl: 1    calcium carbonate (OS-QIANA) 600 MG tablet, Take 600 mg by mouth 2 (two) times a day with meals, Disp: , Rfl:     cholecalciferol (VITAMIN D3) 1,000 units tablet, Take 1,000 Units by mouth 2 (two) times a day, Disp: , Rfl:     Diclofenac Sodium (VOLTAREN) 1 %, APPLY 2 GRAMS EXTERNALLY TO THE AFFECTED AREA FOUR TIMES DAILY, Disp: , Rfl:     latanoprost (XALATAN) 0 005 % ophthalmic solution, Administer 1 drop to both eyes daily, Disp: , Rfl:     losartan (COZAAR) 100 MG tablet, TAKE 1 TABLET(100 MG) BY MOUTH DAILY, Disp: 90 tablet, Rfl: 0    MELATONIN PO, Take 5 mg by mouth daily , Disp: , Rfl:     metoprolol succinate (TOPROL-XL) 50 mg 24 hr tablet, Take 1 tablet (50 mg total) by mouth daily at bedtime (Patient taking differently: Take 50 mg by mouth daily at bedtime 25 mg as of 6/28/21 per patient), Disp: 90 tablet, Rfl: 1    Multiple Vitamins-Minerals (PRESERVISION/LUTEIN PO), PreserVision Lutein, Disp: , Rfl:     venlafaxine (EFFEXOR-XR) 75 mg 24 hr capsule, Take 1 capsule (75 mg total) by mouth daily with breakfast, Disp: 30 capsule, Rfl: 3    vitamin B-12 (VITAMIN B-12) 1,000 mcg tablet, Take by mouth daily, Disp: , Rfl:     Objective:    Vitals:    07/27/21 1230   BP: 145/85   Pulse: 66   Weight: 63 kg (139 lb)   Height: 4' 11" (1 499 m)       Physical Exam  Constitutional:       General: She is not in acute distress  Appearance: She is well-developed  HENT:      Head: Normocephalic and atraumatic  Eyes:      General: Lids are normal  No scleral icterus       Conjunctiva/sclera: Conjunctivae normal    Pulmonary:      Effort: Pulmonary effort is normal  No tachypnea, accessory muscle usage or respiratory distress  Musculoskeletal:      Cervical back: Neck supple  Comments: No joint swelling or synovitis anywhere  Skin:     General: Skin is dry  Findings: No rash  Neurological:      Mental Status: She is alert  Psychiatric:         Behavior: Behavior normal  Behavior is cooperative  Imaging:   XR hands 5/28/21:  Images personally reviewed in PACS and my impression is:  No erosive or inflammatory changes noted  +OA       Labs:   Component      Latest Ref Rng & Units 5/28/2021   HEPATITIS B SURFACE ANTIGEN      Non-reactive, NonReactive - Confirmed Non-reactive   HEPATITIS C ANTIBODY      Non-reactive Non-reactive   HEPATITIS B CORE IGM ANTIBODY      Non-reactive Non-reactive   HEPATITIS B CORE TOTAL ANTIBODY      Non-reactive Non-reactive   SSA (RO) ANTIBODY      0 0 - 0 9 AI <0 2   SSB (LA) ANTIBODY      0 0 - 0 9 AI <0 2   ANTI-NUCLEAR ANTIBODY (RAUL)      Negative Negative   C-REACTIVE PROTEIN      <7 3 mg/L <1 3   CYCLIC CITRULLINATED PEPTIDE ANTIBODY      See comment 0 5   Sed Rate      0 - 29 mm/hour 24   RHEUMATOID FACTOR      Negative Negative

## 2021-08-13 DIAGNOSIS — I10 ESSENTIAL HYPERTENSION: ICD-10-CM

## 2021-08-13 NOTE — TELEPHONE ENCOUNTER
Patient would need the following medication refilled    Metoprolol Succinate 50 mg one tablet by mouth at bedtime    90 with a refill     Please send it to the [facility] on [address]

## 2021-08-16 RX ORDER — METOPROLOL SUCCINATE 25 MG/1
25 TABLET, EXTENDED RELEASE ORAL
Qty: 90 TABLET | Refills: 1 | Status: SHIPPED | OUTPATIENT
Start: 2021-08-16 | End: 2021-08-25 | Stop reason: ALTCHOICE

## 2021-08-17 ENCOUNTER — TELEPHONE (OUTPATIENT)
Dept: INTERNAL MEDICINE CLINIC | Facility: CLINIC | Age: 76
End: 2021-08-17

## 2021-08-17 NOTE — TELEPHONE ENCOUNTER
Pt  Is on Metoprolol 50 mg  The 25 mg was sent to pharmacy in error  Pt  Already picked up at pharmacy, will take 2 tablets of 25 mg  Pt  Will call when she is ready for new script of Metoprolol 50 mg since she will run out sooner since she is taking 2 tablets of the 25 mg

## 2021-08-24 ENCOUNTER — OFFICE VISIT (OUTPATIENT)
Dept: INTERNAL MEDICINE CLINIC | Facility: CLINIC | Age: 76
End: 2021-08-24
Payer: MEDICARE

## 2021-08-24 VITALS
TEMPERATURE: 98.6 F | SYSTOLIC BLOOD PRESSURE: 138 MMHG | RESPIRATION RATE: 20 BRPM | HEART RATE: 67 BPM | OXYGEN SATURATION: 96 % | WEIGHT: 140.8 LBS | HEIGHT: 59 IN | DIASTOLIC BLOOD PRESSURE: 88 MMHG | BODY MASS INDEX: 28.39 KG/M2

## 2021-08-24 DIAGNOSIS — Z12.11 SCREENING FOR COLORECTAL CANCER: ICD-10-CM

## 2021-08-24 DIAGNOSIS — Z13.6 SCREENING FOR CARDIOVASCULAR CONDITION: ICD-10-CM

## 2021-08-24 DIAGNOSIS — R19.7 DIARRHEA DUE TO MALABSORPTION: ICD-10-CM

## 2021-08-24 DIAGNOSIS — N18.32 CHRONIC RENAL IMPAIRMENT, STAGE 3B (HCC): ICD-10-CM

## 2021-08-24 DIAGNOSIS — Z13.1 SCREENING FOR DIABETES MELLITUS: ICD-10-CM

## 2021-08-24 DIAGNOSIS — Z12.12 SCREENING FOR COLORECTAL CANCER: ICD-10-CM

## 2021-08-24 DIAGNOSIS — M81.0 AGE-RELATED OSTEOPOROSIS WITHOUT CURRENT PATHOLOGICAL FRACTURE: ICD-10-CM

## 2021-08-24 DIAGNOSIS — Z12.31 ENCOUNTER FOR SCREENING MAMMOGRAM FOR BREAST CANCER: ICD-10-CM

## 2021-08-24 DIAGNOSIS — F33.9 DEPRESSION, RECURRENT (HCC): ICD-10-CM

## 2021-08-24 DIAGNOSIS — I10 ESSENTIAL HYPERTENSION: Primary | ICD-10-CM

## 2021-08-24 DIAGNOSIS — K90.9 DIARRHEA DUE TO MALABSORPTION: ICD-10-CM

## 2021-08-24 DIAGNOSIS — F41.9 ANXIETY: ICD-10-CM

## 2021-08-24 DIAGNOSIS — Z00.00 MEDICARE ANNUAL WELLNESS VISIT, SUBSEQUENT: ICD-10-CM

## 2021-08-24 DIAGNOSIS — E87.5 HYPERKALEMIA: ICD-10-CM

## 2021-08-24 DIAGNOSIS — E78.2 MIXED HYPERLIPIDEMIA: ICD-10-CM

## 2021-08-24 PROBLEM — R11.0 NAUSEA: Status: RESOLVED | Noted: 2021-04-15 | Resolved: 2021-08-24

## 2021-08-24 PROCEDURE — 99214 OFFICE O/P EST MOD 30 MIN: CPT | Performed by: INTERNAL MEDICINE

## 2021-08-24 PROCEDURE — G0439 PPPS, SUBSEQ VISIT: HCPCS | Performed by: INTERNAL MEDICINE

## 2021-08-24 PROCEDURE — 1123F ACP DISCUSS/DSCN MKR DOCD: CPT | Performed by: INTERNAL MEDICINE

## 2021-08-24 RX ORDER — BUSPIRONE HYDROCHLORIDE 5 MG/1
5 TABLET ORAL 2 TIMES DAILY
Qty: 60 TABLET | Refills: 1 | Status: SHIPPED | OUTPATIENT
Start: 2021-08-24 | End: 2021-10-11 | Stop reason: SDUPTHER

## 2021-08-24 NOTE — ASSESSMENT & PLAN NOTE
Lab Results   Component Value Date    EGFR 38 07/07/2021    EGFR 42 07/01/2021    EGFR 43 03/17/2021    CREATININE 1 36 (H) 07/07/2021    CREATININE 1 24 07/01/2021    CREATININE 1 21 03/17/2021   Trend kidney function  Avoid nephrotoxic agents

## 2021-08-24 NOTE — PATIENT INSTRUCTIONS
Medicare Preventive Visit Patient Instructions  Thank you for completing your Welcome to Medicare Visit or Medicare Annual Wellness Visit today  Your next wellness visit will be due in one year (8/25/2022)  The screening/preventive services that you may require over the next 5-10 years are detailed below  Some tests may not apply to you based off risk factors and/or age  Screening tests ordered at today's visit but not completed yet may show as past due  Also, please note that scanned in results may not display below  Preventive Screenings:  Service Recommendations Previous Testing/Comments   Colorectal Cancer Screening  * Colonoscopy    * Fecal Occult Blood Test (FOBT)/Fecal Immunochemical Test (FIT)  * Fecal DNA/Cologuard Test  * Flexible Sigmoidoscopy Age: 54-65 years old   Colonoscopy: every 10 years (may be performed more frequently if at higher risk)  OR  FOBT/FIT: every 1 year  OR  Cologuard: every 3 years  OR  Sigmoidoscopy: every 5 years  Screening may be recommended earlier than age 48 if at higher risk for colorectal cancer  Also, an individualized decision between you and your healthcare provider will decide whether screening between the ages of 74-80 would be appropriate  Colonoscopy: Not on file  FOBT/FIT: Not on file  Cologuard: Not on file  Sigmoidoscopy: Not on file          Breast Cancer Screening Age: 36 years old  Frequency: every 1-2 years  Not required if history of left and right mastectomy Mammogram: Not on file    Screening Not Indicated  History Breast Cancer   Cervical Cancer Screening Between the ages of 21-29, pap smear recommended once every 3 years  Between the ages of 33-67, can perform pap smear with HPV co-testing every 5 years     Recommendations may differ for women with a history of total hysterectomy, cervical cancer, or abnormal pap smears in past  Pap Smear: 02/27/2020    Screening Not Indicated   Hepatitis C Screening Once for adults born between 1945 and 1965  More frequently in patients at high risk for Hepatitis C Hep C Antibody: 08/25/2020    Screening Current   Diabetes Screening 1-2 times per year if you're at risk for diabetes or have pre-diabetes Fasting glucose: 93 mg/dL   A1C: No results in last 5 years    Screening Current   Cholesterol Screening Once every 5 years if you don't have a lipid disorder  May order more often based on risk factors  Lipid panel: 07/01/2021    Screening Not Indicated  History Lipid Disorder     Other Preventive Screenings Covered by Medicare:  1  Abdominal Aortic Aneurysm (AAA) Screening: covered once if your at risk  You're considered to be at risk if you have a family history of AAA  2  Lung Cancer Screening: covers low dose CT scan once per year if you meet all of the following conditions: (1) Age 50-69; (2) No signs or symptoms of lung cancer; (3) Current smoker or have quit smoking within the last 15 years; (4) You have a tobacco smoking history of at least 30 pack years (packs per day multiplied by number of years you smoked); (5) You get a written order from a healthcare provider  3  Glaucoma Screening: covered annually if you're considered high risk: (1) You have diabetes OR (2) Family history of glaucoma OR (3)  aged 48 and older OR (3)  American aged 72 and older  3  Osteoporosis Screening: covered every 2 years if you meet one of the following conditions: (1) You're estrogen deficient and at risk for osteoporosis based off medical history and other findings; (2) Have a vertebral abnormality; (3) On glucocorticoid therapy for more than 3 months; (4) Have primary hyperparathyroidism; (5) On osteoporosis medications and need to assess response to drug therapy  · Last bone density test (DXA Scan): 07/19/2021   5  HIV Screening: covered annually if you're between the age of 15-65  Also covered annually if you are younger than 13 and older than 72 with risk factors for HIV infection   For pregnant patients, it is covered up to 3 times per pregnancy  Immunizations:  Immunization Recommendations   Influenza Vaccine Annual influenza vaccination during flu season is recommended for all persons aged >= 6 months who do not have contraindications   Pneumococcal Vaccine (Prevnar and Pneumovax)  * Prevnar = PCV13  * Pneumovax = PPSV23   Adults 25-60 years old: 1-3 doses may be recommended based on certain risk factors  Adults 72 years old: Prevnar (PCV13) vaccine recommended followed by Pneumovax (PPSV23) vaccine  If already received PPSV23 since turning 65, then PCV13 recommended at least one year after PPSV23 dose  Hepatitis B Vaccine 3 dose series if at intermediate or high risk (ex: diabetes, end stage renal disease, liver disease)   Tetanus (Td) Vaccine - COST NOT COVERED BY MEDICARE PART B Following completion of primary series, a booster dose should be given every 10 years to maintain immunity against tetanus  Td may also be given as tetanus wound prophylaxis  Tdap Vaccine - COST NOT COVERED BY MEDICARE PART B Recommended at least once for all adults  For pregnant patients, recommended with each pregnancy  Shingles Vaccine (Shingrix) - COST NOT COVERED BY MEDICARE PART B  2 shot series recommended in those aged 48 and above     Health Maintenance Due:      Topic Date Due    Hepatitis C Screening  Completed     Immunizations Due:      Topic Date Due    Pneumococcal Vaccine: 65+ Years (1 of 1 - PPSV23) Never done    Influenza Vaccine (1) 09/01/2021     Advance Directives   What are advance directives? Advance directives are legal documents that state your wishes and plans for medical care  These plans are made ahead of time in case you lose your ability to make decisions for yourself  Advance directives can apply to any medical decision, such as the treatments you want, and if you want to donate organs  What are the types of advance directives?   There are many types of advance directives, and each state has rules about how to use them  You may choose a combination of any of the following:  · Living will: This is a written record of the treatment you want  You can also choose which treatments you do not want, which to limit, and which to stop at a certain time  This includes surgery, medicine, IV fluid, and tube feedings  · Durable power of  for healthcare [facility]): This is a written record that states who you want to make healthcare choices for you when you are unable to make them for yourself  This person, called a proxy, is usually a family member or a friend  You may choose more than 1 proxy  · Do not resuscitate (DNR) order:  A DNR order is used in case your heart stops beating or you stop breathing  It is a request not to have certain forms of treatment, such as CPR  A DNR order may be included in other types of advance directives  · Medical directive: This covers the care that you want if you are in a coma, near death, or unable to make decisions for yourself  You can list the treatments you want for each condition  Treatment may include pain medicine, surgery, blood transfusions, dialysis, IV or tube feedings, and a ventilator (breathing machine)  · Values history: This document has questions about your views, beliefs, and how you feel and think about life  This information can help others choose the care that you would choose  Why are advance directives important? An advance directive helps you control your care  Although spoken wishes may be used, it is better to have your wishes written down  Spoken wishes can be misunderstood, or not followed  Treatments may be given even if you do not want them  An advance directive may make it easier for your family to make difficult choices about your care     Weight Management   Why it is important to manage your weight:  Being overweight increases your risk of health conditions such as heart disease, high blood pressure, type 2 diabetes, and certain types of cancer  It can also increase your risk for osteoarthritis, sleep apnea, and other respiratory problems  Aim for a slow, steady weight loss  Even a small amount of weight loss can lower your risk of health problems  How to lose weight safely:  A safe and healthy way to lose weight is to eat fewer calories and get regular exercise  You can lose up about 1 pound a week by decreasing the number of calories you eat by 500 calories each day  Healthy meal plan for weight management:  A healthy meal plan includes a variety of foods, contains fewer calories, and helps you stay healthy  A healthy meal plan includes the following:  · Eat whole-grain foods more often  A healthy meal plan should contain fiber  Fiber is the part of grains, fruits, and vegetables that is not broken down by your body  Whole-grain foods are healthy and provide extra fiber in your diet  Some examples of whole-grain foods are whole-wheat breads and pastas, oatmeal, brown rice, and bulgur  · Eat a variety of vegetables every day  Include dark, leafy greens such as spinach, kale, ethan greens, and mustard greens  Eat yellow and orange vegetables such as carrots, sweet potatoes, and winter squash  · Eat a variety of fruits every day  Choose fresh or canned fruit (canned in its own juice or light syrup) instead of juice  Fruit juice has very little or no fiber  · Eat low-fat dairy foods  Drink fat-free (skim) milk or 1% milk  Eat fat-free yogurt and low-fat cottage cheese  Try low-fat cheeses such as mozzarella and other reduced-fat cheeses  · Choose meat and other protein foods that are low in fat  Choose beans or other legumes such as split peas or lentils  Choose fish, skinless poultry (chicken or turkey), or lean cuts of red meat (beef or pork)  Before you cook meat or poultry, cut off any visible fat  · Use less fat and oil  Try baking foods instead of frying them   Add less fat, such as margarine, sour cream, regular salad dressing and mayonnaise to foods  Eat fewer high-fat foods  Some examples of high-fat foods include french fries, doughnuts, ice cream, and cakes  · Eat fewer sweets  Limit foods and drinks that are high in sugar  This includes candy, cookies, regular soda, and sweetened drinks  Exercise:  Exercise at least 30 minutes per day on most days of the week  Some examples of exercise include walking, biking, dancing, and swimming  You can also fit in more physical activity by taking the stairs instead of the elevator or parking farther away from stores  Ask your healthcare provider about the best exercise plan for you  © Copyright [employer] 2018 Information is for End User's use only and may not be sold, redistributed or otherwise used for commercial purposes  All illustrations and images included in CareNotes® are the copyrighted property of [employer]  or [facility] Preventive Visit Patient Instructions  Thank you for completing your Welcome to Medicare Visit or Medicare Annual Wellness Visit today  Your next wellness visit will be due in one year (8/25/2022)  The screening/preventive services that you may require over the next 5-10 years are detailed below  Some tests may not apply to you based off risk factors and/or age  Screening tests ordered at today's visit but not completed yet may show as past due  Also, please note that scanned in results may not display below    Preventive Screenings:  Service Recommendations Previous Testing/Comments   Colorectal Cancer Screening  * Colonoscopy    * Fecal Occult Blood Test (FOBT)/Fecal Immunochemical Test (FIT)  * Fecal DNA/Cologuard Test  * Flexible Sigmoidoscopy Age: 54-65 years old   Colonoscopy: every 10 years (may be performed more frequently if at higher risk)  OR  FOBT/FIT: every 1 year  OR  Cologuard: every 3 years  OR  Sigmoidoscopy: every 5 years  Screening may be recommended earlier than age 48 if at higher risk for colorectal cancer  Also, an individualized decision between you and your healthcare provider will decide whether screening between the ages of 74-80 would be appropriate  Colonoscopy: Not on file  FOBT/FIT: Not on file  Cologuard: Not on file  Sigmoidoscopy: Not on file          Breast Cancer Screening Age: 36 years old  Frequency: every 1-2 years  Not required if history of left and right mastectomy Mammogram: Not on file    Screening Not Indicated  History Breast Cancer   Cervical Cancer Screening Between the ages of 21-29, pap smear recommended once every 3 years  Between the ages of 33-67, can perform pap smear with HPV co-testing every 5 years  Recommendations may differ for women with a history of total hysterectomy, cervical cancer, or abnormal pap smears in past  Pap Smear: 02/27/2020    Screening Not Indicated   Hepatitis C Screening Once for adults born between 1945 and 1965  More frequently in patients at high risk for Hepatitis C Hep C Antibody: 08/25/2020    Screening Current   Diabetes Screening 1-2 times per year if you're at risk for diabetes or have pre-diabetes Fasting glucose: 93 mg/dL   A1C: No results in last 5 years    Screening Current   Cholesterol Screening Once every 5 years if you don't have a lipid disorder  May order more often based on risk factors  Lipid panel: 07/01/2021    Screening Not Indicated  History Lipid Disorder     Other Preventive Screenings Covered by Medicare:  6  Abdominal Aortic Aneurysm (AAA) Screening: covered once if your at risk  You're considered to be at risk if you have a family history of AAA    7  Lung Cancer Screening: covers low dose CT scan once per year if you meet all of the following conditions: (1) Age 50-69; (2) No signs or symptoms of lung cancer; (3) Current smoker or have quit smoking within the last 15 years; (4) You have a tobacco smoking history of at least 30 pack years (packs per day multiplied by number of years you smoked); (5) You get a written order from a healthcare provider  8  Glaucoma Screening: covered annually if you're considered high risk: (1) You have diabetes OR (2) Family history of glaucoma OR (3)  aged 48 and older OR (3)  American aged 72 and older  5  Osteoporosis Screening: covered every 2 years if you meet one of the following conditions: (1) You're estrogen deficient and at risk for osteoporosis based off medical history and other findings; (2) Have a vertebral abnormality; (3) On glucocorticoid therapy for more than 3 months; (4) Have primary hyperparathyroidism; (5) On osteoporosis medications and need to assess response to drug therapy  · Last bone density test (DXA Scan): 07/19/2021  10  HIV Screening: covered annually if you're between the age of 12-76  Also covered annually if you are younger than 13 and older than 72 with risk factors for HIV infection  For pregnant patients, it is covered up to 3 times per pregnancy  Immunizations:  Immunization Recommendations   Influenza Vaccine Annual influenza vaccination during flu season is recommended for all persons aged >= 6 months who do not have contraindications   Pneumococcal Vaccine (Prevnar and Pneumovax)  * Prevnar = PCV13  * Pneumovax = PPSV23   Adults 25-60 years old: 1-3 doses may be recommended based on certain risk factors  Adults 72 years old: Prevnar (PCV13) vaccine recommended followed by Pneumovax (PPSV23) vaccine  If already received PPSV23 since turning 65, then PCV13 recommended at least one year after PPSV23 dose  Hepatitis B Vaccine 3 dose series if at intermediate or high risk (ex: diabetes, end stage renal disease, liver disease)   Tetanus (Td) Vaccine - COST NOT COVERED BY MEDICARE PART B Following completion of primary series, a booster dose should be given every 10 years to maintain immunity against tetanus  Td may also be given as tetanus wound prophylaxis     Tdap Vaccine - COST NOT COVERED BY MEDICARE PART B Recommended at least once for all adults  For pregnant patients, recommended with each pregnancy  Shingles Vaccine (Shingrix) - COST NOT COVERED BY MEDICARE PART B  2 shot series recommended in those aged 48 and above     Health Maintenance Due:      Topic Date Due    Hepatitis C Screening  Completed     Immunizations Due:      Topic Date Due    Pneumococcal Vaccine: 65+ Years (1 of 1 - PPSV23) Never done    Influenza Vaccine (1) 09/01/2021     Advance Directives   What are advance directives? Advance directives are legal documents that state your wishes and plans for medical care  These plans are made ahead of time in case you lose your ability to make decisions for yourself  Advance directives can apply to any medical decision, such as the treatments you want, and if you want to donate organs  What are the types of advance directives? There are many types of advance directives, and each state has rules about how to use them  You may choose a combination of any of the following:  · Living will: This is a written record of the treatment you want  You can also choose which treatments you do not want, which to limit, and which to stop at a certain time  This includes surgery, medicine, IV fluid, and tube feedings  · Durable power of  for healthcare [facility]): This is a written record that states who you want to make healthcare choices for you when you are unable to make them for yourself  This person, called a proxy, is usually a family member or a friend  You may choose more than 1 proxy  · Do not resuscitate (DNR) order:  A DNR order is used in case your heart stops beating or you stop breathing  It is a request not to have certain forms of treatment, such as CPR  A DNR order may be included in other types of advance directives  · Medical directive: This covers the care that you want if you are in a coma, near death, or unable to make decisions for yourself   You can list the treatments you want for each condition  Treatment may include pain medicine, surgery, blood transfusions, dialysis, IV or tube feedings, and a ventilator (breathing machine)  · Values history: This document has questions about your views, beliefs, and how you feel and think about life  This information can help others choose the care that you would choose  Why are advance directives important? An advance directive helps you control your care  Although spoken wishes may be used, it is better to have your wishes written down  Spoken wishes can be misunderstood, or not followed  Treatments may be given even if you do not want them  An advance directive may make it easier for your family to make difficult choices about your care  Weight Management   Why it is important to manage your weight:  Being overweight increases your risk of health conditions such as heart disease, high blood pressure, type 2 diabetes, and certain types of cancer  It can also increase your risk for osteoarthritis, sleep apnea, and other respiratory problems  Aim for a slow, steady weight loss  Even a small amount of weight loss can lower your risk of health problems  How to lose weight safely:  A safe and healthy way to lose weight is to eat fewer calories and get regular exercise  You can lose up about 1 pound a week by decreasing the number of calories you eat by 500 calories each day  Healthy meal plan for weight management:  A healthy meal plan includes a variety of foods, contains fewer calories, and helps you stay healthy  A healthy meal plan includes the following:  · Eat whole-grain foods more often  A healthy meal plan should contain fiber  Fiber is the part of grains, fruits, and vegetables that is not broken down by your body  Whole-grain foods are healthy and provide extra fiber in your diet  Some examples of whole-grain foods are whole-wheat breads and pastas, oatmeal, brown rice, and bulgur  · Eat a variety of vegetables every day    Include dark, leafy greens such as spinach, kale, ethan greens, and mustard greens  Eat yellow and orange vegetables such as carrots, sweet potatoes, and winter squash  · Eat a variety of fruits every day  Choose fresh or canned fruit (canned in its own juice or light syrup) instead of juice  Fruit juice has very little or no fiber  · Eat low-fat dairy foods  Drink fat-free (skim) milk or 1% milk  Eat fat-free yogurt and low-fat cottage cheese  Try low-fat cheeses such as mozzarella and other reduced-fat cheeses  · Choose meat and other protein foods that are low in fat  Choose beans or other legumes such as split peas or lentils  Choose fish, skinless poultry (chicken or turkey), or lean cuts of red meat (beef or pork)  Before you cook meat or poultry, cut off any visible fat  · Use less fat and oil  Try baking foods instead of frying them  Add less fat, such as margarine, sour cream, regular salad dressing and mayonnaise to foods  Eat fewer high-fat foods  Some examples of high-fat foods include french fries, doughnuts, ice cream, and cakes  · Eat fewer sweets  Limit foods and drinks that are high in sugar  This includes candy, cookies, regular soda, and sweetened drinks  Exercise:  Exercise at least 30 minutes per day on most days of the week  Some examples of exercise include walking, biking, dancing, and swimming  You can also fit in more physical activity by taking the stairs instead of the elevator or parking farther away from stores  Ask your healthcare provider about the best exercise plan for you  © Copyright [employer] 2018 Information is for End User's use only and may not be sold, redistributed or otherwise used for commercial purposes   All illustrations and images included in CareNotes® are the copyrighted property of A D A M , Inc  or [address] [place] [address]

## 2021-08-24 NOTE — ASSESSMENT & PLAN NOTE
Stable  Continue to monitor clinically  She is to contact office and Gastroenterology for recurrence of diarrhea

## 2021-08-24 NOTE — PROGRESS NOTES
error   Assessment and Plan:     Problem List Items Addressed This Visit        Digestive    Diarrhea due to malabsorption       Cardiovascular and Mediastinum    Essential hypertension - Primary       Other    Hyperlipidemia    Anxiety    Relevant Medications    busPIRone (BUSPAR) 5 mg tablet      Other Visit Diagnoses     Medicare annual wellness visit, subsequent        Screening for diabetes mellitus        Screening for cardiovascular condition        Encounter for screening mammogram for breast cancer        Screening for colorectal cancer               Preventive health issues were discussed with patient, and age appropriate screening tests were ordered as noted in patient's After Visit Summary  Personalized health advice and appropriate referrals for health education or preventive services given if needed, as noted in patient's After Visit Summary       History of Present Illness:     Patient presents for Medicare Annual Wellness visit    Patient Care Team:  Ida Rizzo MD as PCP - General (Internal Medicine)  Tommy Mike MD (Ophthalmology)  Joshua Sepulveda MD (Otolaryngology)  Silvia Driver DO (Rheumatology)  Miguel Chow DPM (Podiatry)     Problem List:     Patient Active Problem List   Diagnosis    Fibromyalgia    Glaucoma    Hyperlipidemia    Essential hypertension    Restless legs    Rheumatoid arthritis ([facility] )    Pancreatic lesion    Anxiety    Osteoarthritis of knee    Osteoarthritis of hip    Osteoporosis    Lumbosacral spondylosis without myelopathy    Macular degeneration    Chronic renal insufficiency, stage III (moderate) ([address] )    Stenosis of right carotid artery    Family history of pancreatic cancer    History of right knee joint replacement    Chronic instability of right knee    Scoliosis, or kyphoscoliosis, idiopathic    Greater trochanteric bursitis of right hip    History of hip replacement, total, right    Chronic left-sided low back pain without sciatica    Lumbar spondylosis    Hyperkalemia    Trigger ring finger of right hand    Arthritis of left foot    Cervical spondylosis    Difficulty sleeping    Diarrhea due to malabsorption    Nausea    Pancreatic insufficiency    Depression, recurrent (HCC)      Past Medical and Surgical History:     Past Medical History:   Diagnosis Date    Benign paroxysmal positional vertigo due to bilateral vestibular disorder 1/28/2020    Breast cancer ([address] ) 2007    Bilateral mastectomy with chemotherapy    Chronic maxillary sinusitis 7/20/2020    Gastroesophageal reflux disease without esophagitis 1/28/2020    Hypertension     Hypertensive disorder 5/2/2019    Pancreatitis     PONV (postoperative nausea and vomiting)     Popliteal cyst, left 6/30/2020    PUD (peptic ulcer disease) 1/28/2020    Scoliosis     Skin cancer      Past Surgical History:   Procedure Laterality Date    BASAL CELL CARCINOMA EXCISION      nose    BREAST BIOPSY      BREAST IMPLANT Bilateral     BREAST SURGERY      double mastectomy    CARPAL TUNNEL RELEASE      CATARACT EXTRACTION      DILATION AND CURETTAGE OF UTERUS      GANGLION CYST EXCISION Left     wrist    HERNIA REPAIR      HIP SURGERY      HYSTERECTOMY      KNEE ARTHROSCOPY      KNEE SURGERY      MASTECTOMY Bilateral 2007    NASAL ENDOSCOPY W/ BALLON SINUPLASTY      x4     PLANTAR FASCIA SURGERY Bilateral 1990    MS NASAL/SINUS ENDOSCOPY,RMV TISS MAXILL SINUS Right 7/27/2020    Procedure: FUNCTIONAL ENDOSCOPIC SINUS SURGERY (FESS) IMAGED GUIDED, MAXILLARY ANTROSTOM Y  ;SEPTOPLASTY;  Surgeon: Allyn Mishra MD;  Location: [facility];  Service: ENT   [address] [place] [address] Bilateral 2004 2005    TOTAL ABDOMINAL HYSTERECTOMY      w RSO    TRIGGER FINGER RELEASE      TUBAL LIGATION        Family History:     Family History   Problem Relation Age of Onset    No Known Problems Mother     No Known Problems Father  No Known Problems Maternal Grandmother     No Known Problems Maternal Grandfather     No Known Problems Paternal Grandmother     No Known Problems Paternal Grandfather       Social History:     Social History     Socioeconomic History    Marital status:      Spouse name: None    Number of children: None    Years of education: None    Highest education level: None   Occupational History    None   Tobacco Use    Smoking status: Former Smoker     Types: Cigarettes     Quit date:      Years since quittin 6    Smokeless tobacco: Never Used   Vaping Use    Vaping Use: Never used   Substance and Sexual Activity    Alcohol use: Not Currently    Drug use: Never    Sexual activity: Not Currently   Other Topics Concern    None   Social History Narrative    None     Social Determinants of Health     Financial Resource Strain:     Difficulty of Paying Living Expenses:    Food Insecurity:     Worried About Running Out of Food in the Last Year:     Ran Out of Food in the Last Year:    Transportation Needs:     Lack of Transportation (Medical):      Lack of Transportation (Non-Medical):    Physical Activity:     Days of Exercise per Week:     Minutes of Exercise per Session:    Stress:     Feeling of Stress :    Social Connections:     Frequency of Communication with Friends and Family:     Frequency of Social Gatherings with Friends and Family:     Attends Rastafarian Services:     Active Member of Clubs or Organizations:     Attends Club or Organization Meetings:     Marital Status:    Intimate Partner Violence:     Fear of Current or Ex-Partner:     Emotionally Abused:     Physically Abused:     Sexually Abused:       Medications and Allergies:     Current Outpatient Medications   Medication Sig Dispense Refill    acetaminophen (TYLENOL) 500 mg tablet Take 1 tablet (500 mg total) by mouth every 6 (six) hours as needed for mild pain 30 tablet 0    aspirin (ECOTRIN LOW STRENGTH) 81 mg EC tablet Take 81 mg by mouth daily      atorvastatin (LIPITOR) 10 mg tablet Take 0 5 tablets (5 mg total) by mouth daily 45 tablet 1    Bacillus Coagulans-Inulin (ALIGN PREBIOTIC-PROBIOTIC PO) Take by mouth daily      calcium carbonate (OS-QIANA) 600 MG tablet Take 600 mg by mouth 2 (two) times a day with meals      cholecalciferol (VITAMIN D3) 1,000 units tablet Take 2,000 Units by mouth daily       Diclofenac Sodium (VOLTAREN) 1 % as needed       latanoprost (XALATAN) 0 005 % ophthalmic solution Administer 1 drop to both eyes daily      losartan (COZAAR) 100 MG tablet TAKE 1 TABLET(100 MG) BY MOUTH DAILY 90 tablet 0    MELATONIN PO Take 5 mg by mouth daily       metoprolol succinate (TOPROL-XL) 25 mg 24 hr tablet Take 1 tablet (25 mg total) by mouth daily at bedtime (Patient taking differently: Take 50 mg by mouth daily at bedtime ) 90 tablet 1    Multiple Vitamins-Minerals (PRESERVISION/LUTEIN PO) PreserVision Lutein      venlafaxine (EFFEXOR-XR) 75 mg 24 hr capsule Take 1 capsule (75 mg total) by mouth daily with breakfast 30 capsule 3    vitamin B-12 (VITAMIN B-12) 1,000 mcg tablet Take by mouth daily      busPIRone (BUSPAR) 5 mg tablet Take 1 tablet (5 mg total) by mouth 2 (two) times a day 60 tablet 1     No current facility-administered medications for this visit       Allergies   Allergen Reactions    Dorzolamide Hcl-Timolol Mal Other (See Comments)     Tongue burning    Doxazosin Shortness Of Breath    Gabapentin Other (See Comments)    Lexapro [Escitalopram] Tremor    Lorazepam Hallucinations    Benazepril Cough    Zoloft [Sertraline] Other (See Comments)     Diaphoresis and weight gain      Amlodipine Itching     Excessive wt gain    Amoxicillin-Pot Clavulanate Diarrhea    Buprenorphine Hcl Hallucinations    Carbidopa      Severe muscle spasms    Celecoxib Diarrhea    Chlorthalidone Edema     Itchiness, facial swelling, rash    Clindamycin Diarrhea    Clonidine Other (See Comments)     Burning mouth/tongue    Denosumab     Doxycycline Diarrhea and Nausea Only    Fosamax [Alendronate]      Severe stomach pain    Hydrocodone Hallucinations    Hydrocodone-Acetaminophen Hallucinations    Ibandronic Acid      Acid reflux    Levodopa      svere muscle spasms    Milnacipran      Extreme cold feeling    Pregabalin      sedation    Proline      Arthralgia myalgia    Ropinirole Headache    Rosuvastatin Itching    Tramadol Nausea Only     Severe stomach pain    Trazodone Other (See Comments)     Worsening insomnia     Zenpep [Pancrelipase (Lip-Prot-Amyl)] Diarrhea     svere    Cefdinir Diarrhea and Rash    Rotigotine Rash      Immunizations:     Immunization History   Administered Date(s) Administered    Influenza Split High Dose Preservative Free IM 10/07/2019    Influenza, high dose seasonal 0 7 mL 09/29/2020    SARS-CoV-2 / COVID-19 mRNA IM (Pfizer-BioNTech) 01/27/2021, 02/17/2021    Tdap 09/06/2019      Health Maintenance:         Topic Date Due    Hepatitis C Screening  Completed         Topic Date Due    Pneumococcal Vaccine: 65+ Years (1 of 1 - PPSV23) Never done    Influenza Vaccine (1) 09/01/2021      Medicare Health Risk Assessment:     /88 (BP Location: Right arm, Patient Position: Sitting, Cuff Size: Standard)   Pulse 67   Temp 98 6 °F (37 °C) (Temporal)   Resp 20   Ht 4' 11" (1 499 m)   Wt 63 9 kg (140 lb 12 8 oz)   SpO2 96%   BMI 28 44 kg/m²      Stephenie Roman is here for her Subsequent Wellness visit  Last Medicare Wellness visit information reviewed, patient interviewed and updates made to the record today  Health Risk Assessment:   Patient rates overall health as good  Patient feels that their physical health rating is same  Patient is satisfied with their life  Eyesight was rated as same  Hearing was rated as slightly worse  Patient feels that their emotional and mental health rating is slightly worse   Patients states they are sometimes angry  Patient states they are sometimes unusually tired/fatigued  Pain experienced in the last 7 days has been some  Patient's pain rating has been 5/10  Patient states that she has experienced weight loss or gain in last 6 months  Depression Screening:   PHQ-2 Score: 1  PHQ-9 Score: 1      Fall Risk Screening: In the past year, patient has experienced: no history of falling in past year      Urinary Incontinence Screening:   Patient has not leaked urine accidently in the last six months  Home Safety:  Patient does not have trouble with stairs inside or outside of their home  Patient has working smoke alarms and has no working carbon monoxide detector  Home safety hazards include: none  Nutrition:   Current diet is Regular and Limited junk food  Medications:   Patient is currently taking over-the-counter supplements  OTC medications include: see medication list  Patient is able to manage medications  Activities of Daily Living (ADLs)/Instrumental Activities of Daily Living (IADLs):   Walk and transfer into and out of bed and chair?: Yes  Dress and groom yourself?: Yes    Bathe or shower yourself?: Yes    Feed yourself? Yes  Do your laundry/housekeeping?: Yes  Manage your money, pay your bills and track your expenses?: Yes  Make your own meals?: Yes    Do your own shopping?: Yes    Previous Hospitalizations:   Any hospitalizations or ED visits within the last 12 months?: No      Advance Care Planning:   Living will: Yes    Advanced directive: Yes    Advanced directive counseling given: Yes    End of Life Decisions reviewed with patient: Yes    Provider agrees with end of life decisions: Yes      Comments: Discussed with patient: FULL CODE       Cognitive Screening:   Provider or family/friend/caregiver concerned regarding cognition?: No    PREVENTIVE SCREENINGS      Cardiovascular Screening:    General: Screening Not Indicated, History Lipid Disorder, Risks and Benefits Discussed and Screening Current      Diabetes Screening:     General: Screening Current and Risks and Benefits Discussed      Colorectal Cancer Screening:     General: Risks and Benefits Discussed and Screening Not Indicated      Breast Cancer Screening:     General: Screening Not Indicated, History Breast Cancer and Risks and Benefits Discussed      Cervical Cancer Screening:    General: Screening Not Indicated and Risks and Benefits Discussed      Osteoporosis Screening:    General: Screening Not Indicated, History Osteoporosis and Risks and Benefits Discussed      Abdominal Aortic Aneurysm (AAA) Screening:        General: Screening Not Indicated      Lung Cancer Screening:     General: Screening Not Indicated      Hepatitis C Screening:    General: Screening Current    Screening, Brief Intervention, and Referral to Treatment (SBIRT)    Screening  Typical number of drinks in a day: 0  Typical number of drinks in a week: 0  Interpretation: Low risk drinking behavior  Single Item Drug Screening:  How often have you used an illegal drug (including marijuana) or a prescription medication for non-medical reasons in the past year? never    Single Item Drug Screen Score: 0  Interpretation: Negative screen for possible drug use disorder    Brief Intervention  Alcohol & drug use screenings were reviewed  No concerns regarding substance use disorder identified  Healthy alcohol use/limits discussed  Other Counseling Topics:   Car/seat belt/driving safety, skin self-exam, sunscreen and calcium and vitamin D intake and regular weightbearing exercise         Essence Ogden MD

## 2021-08-24 NOTE — PROGRESS NOTES
Assessment/Plan:    Diarrhea due to malabsorption  Stable  Continue to monitor clinically  She is to contact office and Gastroenterology for recurrence of diarrhea  Essential hypertension  Controlled  Continue losartan 100 mg daily and metoprolol succinate 50 mg daily  Osteoporosis  Continue calcium and vitamin-D supplementation  Chronic renal insufficiency, stage III (moderate) (HCC)  Lab Results   Component Value Date    EGFR 38 07/07/2021    EGFR 42 07/01/2021    EGFR 43 03/17/2021    CREATININE 1 36 (H) 07/07/2021    CREATININE 1 24 07/01/2021    CREATININE 1 21 03/17/2021   Trend kidney function  Avoid nephrotoxic agents  Hyperlipidemia  Stable  Continue atorvastatin 5 mg daily  Anxiety  Continue venlafaxine 75 mg daily and will start BuSpar 5 mg twice a day  Follow-up in 3 months  Hyperkalemia  Will recheck BMP  Diagnoses and all orders for this visit:    Essential hypertension    Medicare annual wellness visit, subsequent    Screening for diabetes mellitus    Screening for cardiovascular condition    Encounter for screening mammogram for breast cancer    Screening for colorectal cancer    Diarrhea due to malabsorption    Mixed hyperlipidemia    Anxiety  -     busPIRone (BUSPAR) 5 mg tablet; Take 1 tablet (5 mg total) by mouth 2 (two) times a day    Hyperkalemia  -     Basic metabolic panel; Future    Age-related osteoporosis without current pathological fracture    Chronic renal impairment, stage 3b (HCC)    Depression, recurrent (HCC)    Other orders  -     Bacillus Coagulans-Inulin (ALIGN PREBIOTIC-PROBIOTIC PO); Take by mouth daily                  Subjective:      Patient ID: Shelby Bermudez is a 66 y o  female  Chief Complaint   Patient presents with    Follow-up     1 month, has BP reading log    Medicare Wellness Visit    Medication Problem     very jumpy and anxious on the new medication but both are helping because she is no longer depressed   She does not take them together       49-year-old female is seen today for follow-up of hypertension and diarrhea  No laboratory studies to review today  Since last visit, she has follow-up with her gastroenterologist who feels her diarrhea may be due to pancreatic insufficiency verses IBS  Benazepril and spironolactone were discontinued and she was started on losartan and her metoprolol dose was increased to 50 mg daily  Celexa was also discontinued due to concern for potential side effect  Since this medication change, she reports resolution of her diarrhea  She has been monitoring her blood pressure at home, average reported readings are upper 130s over 80s  Anxiety/depression:  Overall stable with Effexor 75 mg daily however she does report increased irritability on occasion  Hypertension  This is a chronic problem  The current episode started more than 1 year ago  The problem is unchanged  The problem is controlled  Associated symptoms include anxiety  Pertinent negatives include no chest pain, headaches, palpitations or shortness of breath  Past treatments include angiotensin blockers and beta blockers  The current treatment provides moderate improvement  There are no compliance problems  Anxiety  Presents for follow-up visit  Symptoms include irritability  Patient reports no chest pain, dizziness, nausea, palpitations, shortness of breath or suicidal ideas  Symptoms occur occasionally  The severity of symptoms is mild  The patient sleeps 8 hours per night  The quality of sleep is good  Compliance with medications is %  The following portions of the patient's history were reviewed and updated as appropriate: allergies, current medications, past family history, past medical history, past social history, past surgical history and problem list     Review of Systems   Constitutional: Positive for irritability  Negative for activity change, appetite change, chills, diaphoresis, fatigue and fever     HENT: Negative for congestion, postnasal drip, rhinorrhea, sinus pressure, sinus pain, sneezing and sore throat  Eyes: Negative for visual disturbance  Respiratory: Negative for apnea, cough, choking, chest tightness, shortness of breath and wheezing  Cardiovascular: Negative for chest pain, palpitations and leg swelling  Gastrointestinal: Negative for abdominal distention, abdominal pain, anal bleeding, blood in stool, constipation, diarrhea, nausea and vomiting  Endocrine: Negative for cold intolerance and heat intolerance  Genitourinary: Negative for difficulty urinating, dysuria and hematuria  Musculoskeletal: Negative  Skin: Negative  Neurological: Negative for dizziness, weakness, light-headedness, numbness and headaches  Hematological: Negative for adenopathy  Psychiatric/Behavioral: Negative for agitation, sleep disturbance and suicidal ideas  All other systems reviewed and are negative          Past Medical History:   Diagnosis Date    Benign paroxysmal positional vertigo due to bilateral vestibular disorder 1/28/2020    Breast cancer ([facility] [address] ) 2007    Bilateral mastectomy with chemotherapy    Chronic maxillary sinusitis 7/20/2020    Gastroesophageal reflux disease without esophagitis 1/28/2020    Hypertension     Hypertensive disorder 5/2/2019    Pancreatitis     PONV (postoperative nausea and vomiting)     Popliteal cyst, left 6/30/2020    PUD (peptic ulcer disease) 1/28/2020    Scoliosis     Skin cancer          Current Outpatient Medications:     acetaminophen (TYLENOL) 500 mg tablet, Take 1 tablet (500 mg total) by mouth every 6 (six) hours as needed for mild pain, Disp: 30 tablet, Rfl: 0    aspirin (ECOTRIN LOW STRENGTH) 81 mg EC tablet, Take 81 mg by mouth daily, Disp: , Rfl:     atorvastatin (LIPITOR) 10 mg tablet, Take 0 5 tablets (5 mg total) by mouth daily, Disp: 45 tablet, Rfl: 1    Bacillus Coagulans-Inulin (ALIGN PREBIOTIC-PROBIOTIC PO), Take by mouth daily, Disp: , Rfl:     calcium carbonate (OS-QIANA) 600 MG tablet, Take 600 mg by mouth 2 (two) times a day with meals, Disp: , Rfl:     cholecalciferol (VITAMIN D3) 1,000 units tablet, Take 2,000 Units by mouth daily , Disp: , Rfl:     Diclofenac Sodium (VOLTAREN) 1 %, as needed , Disp: , Rfl:     latanoprost (XALATAN) 0 005 % ophthalmic solution, Administer 1 drop to both eyes daily, Disp: , Rfl:     losartan (COZAAR) 100 MG tablet, TAKE 1 TABLET(100 MG) BY MOUTH DAILY, Disp: 90 tablet, Rfl: 0    MELATONIN PO, Take 5 mg by mouth daily , Disp: , Rfl:     metoprolol succinate (TOPROL-XL) 25 mg 24 hr tablet, Take 1 tablet (25 mg total) by mouth daily at bedtime (Patient taking differently: Take 50 mg by mouth daily at bedtime ), Disp: 90 tablet, Rfl: 1    Multiple Vitamins-Minerals (PRESERVISION/LUTEIN PO), PreserVision Lutein, Disp: , Rfl:     venlafaxine (EFFEXOR-XR) 75 mg 24 hr capsule, Take 1 capsule (75 mg total) by mouth daily with breakfast, Disp: 30 capsule, Rfl: 3    vitamin B-12 (VITAMIN B-12) 1,000 mcg tablet, Take by mouth daily, Disp: , Rfl:     busPIRone (BUSPAR) 5 mg tablet, Take 1 tablet (5 mg total) by mouth 2 (two) times a day, Disp: 60 tablet, Rfl: 1    Allergies   Allergen Reactions    Dorzolamide Hcl-Timolol Mal Other (See Comments)     Tongue burning    Doxazosin Shortness Of Breath    Gabapentin Other (See Comments)    Lexapro [Escitalopram] Tremor    Lorazepam Hallucinations    Benazepril Cough    Zoloft [Sertraline] Other (See Comments)     Diaphoresis and weight gain      Amlodipine Itching     Excessive wt gain    Amoxicillin-Pot Clavulanate Diarrhea    Buprenorphine Hcl Hallucinations    Carbidopa      Severe muscle spasms    Celecoxib Diarrhea    Chlorthalidone Edema     Itchiness, facial swelling, rash    Clindamycin Diarrhea    Clonidine Other (See Comments)     Burning mouth/tongue    Denosumab     Doxycycline Diarrhea and Nausea Only    Fosamax [Alendronate] Severe stomach pain    Hydrocodone Hallucinations    Hydrocodone-Acetaminophen Hallucinations    Ibandronic Acid      Acid reflux    Levodopa      svere muscle spasms    Milnacipran      Extreme cold feeling    Pregabalin      sedation    Proline      Arthralgia myalgia    Ropinirole Headache    Rosuvastatin Itching    Tramadol Nausea Only     Severe stomach pain    Trazodone Other (See Comments)     Worsening insomnia     Zenpep [Pancrelipase (Lip-Prot-Amyl)] Diarrhea     svere    Cefdinir Diarrhea and Rash    Rotigotine Rash       Social History   Past Surgical History:   Procedure Laterality Date    BASAL CELL CARCINOMA EXCISION      nose    BREAST BIOPSY      BREAST IMPLANT Bilateral     BREAST SURGERY      double mastectomy    CARPAL TUNNEL RELEASE      CATARACT EXTRACTION      DILATION AND CURETTAGE OF UTERUS      GANGLION CYST EXCISION Left     wrist    HERNIA REPAIR      HIP SURGERY      HYSTERECTOMY      KNEE ARTHROSCOPY      KNEE SURGERY      MASTECTOMY Bilateral 2007    NASAL ENDOSCOPY W/ BALLON SINUPLASTY      x4     PLANTAR FASCIA SURGERY Bilateral 1990    SD NASAL/SINUS ENDOSCOPY,RMV TISS MAXILL SINUS Right 7/27/2020    Procedure: FUNCTIONAL ENDOSCOPIC SINUS SURGERY (FESS) IMAGED GUIDED, MAXILLARY ANTROSTOM Y  ;SEPTOPLASTY;  Surgeon: Brenda Burnett MD;  [facility];  Service: ENT   [address]      ROTATOR CUFF REPAIR Bilateral 2004 2005    TOTAL ABDOMINAL HYSTERECTOMY      w RSO    TRIGGER FINGER RELEASE      TUBAL LIGATION       Family History   Problem Relation Age of Onset    No Known Problems Mother     No Known Problems Father     No Known Problems Maternal Grandmother     No Known Problems Maternal Grandfather     No Known Problems Paternal Grandmother     No Known Problems Paternal Grandfather        Objective:  /88 (BP Location: Right arm, Patient Position: Sitting, Cuff Size: Standard)   Pulse 67   Temp 98 6 °F (37 °C) (Temporal)   Resp 20   Ht 4' 11" (1 499 m)   Wt 63 9 kg (140 lb 12 8 oz)   SpO2 96%   BMI 28 44 kg/m²     Recent Results (from the past 1344 hour(s))   Lipid panel    Collection Time: 07/01/21 11:01 AM   Result Value Ref Range    Cholesterol 203 (H) 50 - 200 mg/dL    Triglycerides 124 <=150 mg/dL    HDL, Direct 63 >=40 mg/dL    LDL Calculated 115 (H) 0 - 100 mg/dL    Non-HDL-Chol (CHOL-HDL) 140 mg/dl   CBC    Collection Time: 07/01/21 11:01 AM   Result Value Ref Range    WBC 5 47 4 31 - 10 16 Thousand/uL    RBC 4 28 3 81 - 5 12 Million/uL    Hemoglobin 12 7 11 5 - 15 4 g/dL    Hematocrit 41 9 34 8 - 46 1 %    MCV 98 82 - 98 fL    MCH 29 7 26 8 - 34 3 pg    MCHC 30 3 (L) 31 4 - 37 4 g/dL    RDW 13 0 11 6 - 15 1 %    Platelets 798 947 - 311 Thousands/uL    MPV 10 8 8 9 - 12 7 fL   Comprehensive metabolic panel    Collection Time: 07/01/21 11:01 AM   Result Value Ref Range    Sodium 138 136 - 145 mmol/L    Potassium 6 2 (H) 3 5 - 5 3 mmol/L    Chloride 111 (H) 100 - 108 mmol/L    CO2 23 21 - 32 mmol/L    ANION GAP 4 4 - 13 mmol/L    BUN 24 5 - 25 mg/dL    Creatinine 1 24 0 60 - 1 30 mg/dL    Glucose, Fasting 95 65 - 99 mg/dL    Calcium 9 9 8 3 - 10 1 mg/dL    AST 18 5 - 45 U/L    ALT 33 12 - 78 U/L    Alkaline Phosphatase 90 46 - 116 U/L    Total Protein 8 0 6 4 - 8 2 g/dL    Albumin 4 1 3 5 - 5 0 g/dL    Total Bilirubin 0 65 0 20 - 1 00 mg/dL    eGFR 42 ml/min/1 73sq m   Basic metabolic panel    Collection Time: 07/07/21 10:42 AM   Result Value Ref Range    Sodium 136 136 - 145 mmol/L    Potassium 5 9 (H) 3 5 - 5 3 mmol/L    Chloride 111 (H) 100 - 108 mmol/L    CO2 21 21 - 32 mmol/L    ANION GAP 4 4 - 13 mmol/L    BUN 35 (H) 5 - 25 mg/dL    Creatinine 1 36 (H) 0 60 - 1 30 mg/dL    Glucose, Fasting 93 65 - 99 mg/dL    Calcium 9 5 8 3 - 10 1 mg/dL    eGFR 38 ml/min/1 73sq m            Physical Exam  Vitals and nursing note reviewed     Constitutional:       General: She is not in acute distress  Appearance: She is well-developed  She is not diaphoretic  HENT:      Head: Normocephalic and atraumatic  Eyes:      General:         Right eye: No discharge  Left eye: No discharge  Conjunctiva/sclera: Conjunctivae normal       Pupils: Pupils are equal, round, and reactive to light  Neck:      Thyroid: No thyromegaly  Vascular: No JVD  Cardiovascular:      Rate and Rhythm: Normal rate and regular rhythm  Heart sounds: Normal heart sounds  No murmur heard  No friction rub  No gallop  Pulmonary:      Effort: Pulmonary effort is normal  No respiratory distress  Breath sounds: Normal breath sounds  No wheezing or rales  Chest:      Chest wall: No tenderness  Abdominal:      General: There is no distension  Palpations: Abdomen is soft  Tenderness: There is no abdominal tenderness  Musculoskeletal:         General: No tenderness or deformity  Normal range of motion  Cervical back: Normal range of motion and neck supple  Lymphadenopathy:      Cervical: No cervical adenopathy  Skin:     General: Skin is warm and dry  Coloration: Skin is not pale  Findings: No erythema or rash  Neurological:      Mental Status: She is alert and oriented to person, place, and time  Cranial Nerves: No cranial nerve deficit  Coordination: Coordination normal    Psychiatric:         Behavior: Behavior normal          Thought Content:  Thought content normal          Judgment: Judgment normal

## 2021-08-25 ENCOUNTER — OFFICE VISIT (OUTPATIENT)
Dept: CARDIOLOGY CLINIC | Facility: CLINIC | Age: 76
End: 2021-08-25
Payer: MEDICARE

## 2021-08-25 VITALS
BODY MASS INDEX: 28.22 KG/M2 | HEART RATE: 65 BPM | HEIGHT: 59 IN | DIASTOLIC BLOOD PRESSURE: 86 MMHG | SYSTOLIC BLOOD PRESSURE: 156 MMHG | WEIGHT: 140 LBS

## 2021-08-25 DIAGNOSIS — I10 ESSENTIAL HYPERTENSION: ICD-10-CM

## 2021-08-25 DIAGNOSIS — I10 ESSENTIAL HYPERTENSION: Primary | ICD-10-CM

## 2021-08-25 PROCEDURE — 99203 OFFICE O/P NEW LOW 30 MIN: CPT | Performed by: INTERNAL MEDICINE

## 2021-08-25 RX ORDER — LABETALOL 100 MG/1
100 TABLET, FILM COATED ORAL 2 TIMES DAILY
Qty: 60 TABLET | Refills: 11 | Status: SHIPPED | OUTPATIENT
Start: 2021-08-25 | End: 2021-08-31

## 2021-08-25 NOTE — PROGRESS NOTES
Outpatient Consultation - General Cardiology   Bhavya Mane 66 y o  female   MRN: [id number]  Encounter: [id number]      PCP: Nicole Thomas MD      History of Present Illness   Physician Requesting Consult: Consults   Reason for Consult / Principal Problem: Uncontrolled hypertension    HPI: Bhavya Mane is a 66y o  year old female who was referred to [facility] by her primary provider for management of difficult-to-control hypertension  She has a history of HTN, osteoarthritis, and anxiety  She states she has been tried on amlodipine, chlorthalidone, and benazepril in the past but suffered from severe side effects that led to discontinuation  She is currently on metoprolol and losartan  She had started losartan 100mg on 7/11 as well as Venlafaxine and has been tolerating well  However her blood pressures continue to remain elevated at home during multiple time points  She reports having a mostly sedentary lifestyle, quit smoking 40 years ago, and does not drink alcohol  She has no chest pain, shortness of breath, palpitations, or dizziness/lightheadedness  Review of Systems  Review of system was conducted and was negative except for as stated in the HPI        Historical Information   Past Medical History:   Diagnosis Date    Benign paroxysmal positional vertigo due to bilateral vestibular disorder 1/28/2020    Breast cancer ([facility] [address] ) 2007    Bilateral mastectomy with chemotherapy    Chronic maxillary sinusitis 7/20/2020    Gastroesophageal reflux disease without esophagitis 1/28/2020    Hypertension     Hypertensive disorder 5/2/2019    Pancreatitis     PONV (postoperative nausea and vomiting)     Popliteal cyst, left 6/30/2020    PUD (peptic ulcer disease) 1/28/2020    Scoliosis     Skin cancer      Past Surgical History:   Procedure Laterality Date    BASAL CELL CARCINOMA EXCISION      nose    BREAST BIOPSY      BREAST IMPLANT Bilateral     BREAST SURGERY double mastectomy    CARPAL TUNNEL RELEASE      CATARACT EXTRACTION      DILATION AND CURETTAGE OF UTERUS      GANGLION CYST EXCISION Left     wrist    HERNIA REPAIR      HIP SURGERY      HYSTERECTOMY      KNEE ARTHROSCOPY      KNEE SURGERY      MASTECTOMY Bilateral 2007    NASAL ENDOSCOPY W/ BALLON SINUPLASTY      x4     PLANTAR FASCIA SURGERY Bilateral     SD NASAL/SINUS ENDOSCOPY,RMV TISS MAXILL SINUS Right 2020    Procedure: FUNCTIONAL ENDOSCOPIC SINUS SURGERY (FESS) IMAGED GUIDED, MAXILLARY ANTROSTOM Y  ;SEPTOPLASTY;  Surgeon: Roberto Nair MD;  [facility];  Service: ENT   [facility] [address]      ROTATOR CUFF REPAIR Bilateral 2004    TOTAL ABDOMINAL HYSTERECTOMY      w RSO    TRIGGER FINGER RELEASE      TUBAL LIGATION       Social History     Substance and Sexual Activity   Alcohol Use Not Currently     Social History     Substance and Sexual Activity   Drug Use Never     Social History     Tobacco Use   Smoking Status Former Smoker    Types: Cigarettes    Quit date: 34    Years since quittin 6   Smokeless Tobacco Never Used     Family History: non-contributory    Meds/Allergies   Home Medications:   Current Outpatient Medications:     acetaminophen (TYLENOL) 500 mg tablet, Take 1 tablet (500 mg total) by mouth every 6 (six) hours as needed for mild pain, Disp: 30 tablet, Rfl: 0    aspirin (ECOTRIN LOW STRENGTH) 81 mg EC tablet, Take 81 mg by mouth daily, Disp: , Rfl:     atorvastatin (LIPITOR) 10 mg tablet, Take 0 5 tablets (5 mg total) by mouth daily, Disp: 45 tablet, Rfl: 1    Bacillus Coagulans-Inulin (ALIGN PREBIOTIC-PROBIOTIC PO), Take by mouth daily, Disp: , Rfl:     calcium carbonate (OS-QIANA) 600 MG tablet, Take 600 mg by mouth 2 (two) times a day with meals, Disp: , Rfl:     cholecalciferol (VITAMIN D3) 1,000 units tablet, Take 2,000 Units by mouth daily , Disp: , Rfl:     latanoprost (XALATAN) 0 005 % ophthalmic solution, Administer 1 drop to both eyes daily, Disp: , Rfl:     losartan (COZAAR) 100 MG tablet, TAKE 1 TABLET(100 MG) BY MOUTH DAILY, Disp: 90 tablet, Rfl: 0    MELATONIN PO, Take 5 mg by mouth daily , Disp: , Rfl:     metoprolol succinate (TOPROL-XL) 25 mg 24 hr tablet, Take 1 tablet (25 mg total) by mouth daily at bedtime (Patient taking differently: Take 50 mg by mouth daily at bedtime ), Disp: 90 tablet, Rfl: 1    Multiple Vitamins-Minerals (PRESERVISION/LUTEIN PO), PreserVision Lutein, Disp: , Rfl:     venlafaxine (EFFEXOR-XR) 75 mg 24 hr capsule, Take 1 capsule (75 mg total) by mouth daily with breakfast, Disp: 30 capsule, Rfl: 3    vitamin B-12 (VITAMIN B-12) 1,000 mcg tablet, Take by mouth daily, Disp: , Rfl:     busPIRone (BUSPAR) 5 mg tablet, Take 1 tablet (5 mg total) by mouth 2 (two) times a day (Patient not taking: Reported on 8/25/2021), Disp: 60 tablet, Rfl: 1    Diclofenac Sodium (VOLTAREN) 1 %, as needed  (Patient not taking: Reported on 8/25/2021), Disp: , Rfl:     Allergies   Allergen Reactions    Dorzolamide Hcl-Timolol Mal Other (See Comments)     Tongue burning    Doxazosin Shortness Of Breath    Gabapentin Other (See Comments)    Lexapro [Escitalopram] Tremor    Lorazepam Hallucinations    Benazepril Cough    Zoloft [Sertraline] Other (See Comments)     Diaphoresis and weight gain      Amlodipine Itching     Excessive wt gain    Amoxicillin-Pot Clavulanate Diarrhea    Buprenorphine Hcl Hallucinations    Carbidopa      Severe muscle spasms    Celecoxib Diarrhea    Chlorthalidone Edema     Itchiness, facial swelling, rash    Clindamycin Diarrhea    Clonidine Other (See Comments)     Burning mouth/tongue    Denosumab     Doxycycline Diarrhea and Nausea Only    Fosamax [Alendronate]      Severe stomach pain    Hydrocodone Hallucinations    Hydrocodone-Acetaminophen Hallucinations    Ibandronic Acid      Acid reflux    Levodopa      svere muscle spasms    Milnacipran      Extreme cold feeling    Pregabalin      sedation    Proline      Arthralgia myalgia    Ropinirole Headache    Rosuvastatin Itching    Tramadol Nausea Only     Severe stomach pain    Trazodone Other (See Comments)     Worsening insomnia     Zenpep [Pancrelipase (Lip-Prot-Amyl)] Diarrhea     svere    Cefdinir Diarrhea and Rash    Rotigotine Rash         Objective   Vitals: Blood pressure 156/86, pulse 65, height 4' 11" (1 499 m), weight 63 5 kg (140 lb), not currently breastfeeding  Physical Exam    GEN: Rheba Skill appears well, alert and oriented x 3, pleasant and cooperative   HEENT:  Normocephalic, atraumatic, anicteric, moist mucous membranes  NECK: No JVD  HEART: normal rhythm, normal rate, normal S1 and S2, no murmurs, clicks, gallops or rubs   LUNGS: Clear to auscultation bilaterally; no wheezes, rales, or rhonchi; respiration nonlabored   ABDOMEN:  Normoactive bowel sounds, soft, no tenderness, no distention  EXTREMITIES: peripheral pulses palpable; no edema  NEURO: no gross focal findings; cranial nerves grossly intact   SKIN:  Dry, intact, warm to touch    Lab Results: I have personally reviewed pertinent lab results  Lab Results   Component Value Date    TRIG 124 07/01/2021    HDL 63 07/01/2021    LDLCALC 115 (H) 07/01/2021     Lab Results   Component Value Date    K 5 9 (H) 07/07/2021    CO2 21 07/07/2021     (H) 07/07/2021    BUN 35 (H) 07/07/2021    CREATININE 1 36 (H) 07/07/2021    ALT 33 07/01/2021    AST 18 07/01/2021     Lab Results   Component Value Date    WBC 5 47 07/01/2021    HGB 12 7 07/01/2021    HCT 41 9 07/01/2021    MCV 98 07/01/2021     07/01/2021     No results found for: INR      Imaging: I have personally reviewed pertinent reports  EKG:   Date: 8/25  Interpretation: Sinus rhythm with right bundle branch block     Assessment/Plan     Assessment:    1   Hypertension, likely multifactorial from primary neurohormonal causes, anxiety, and medication-related effects    2  CKD Stage 3A, 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    3  Anxiety    4  Hyperkalemia, likely 2/2 CKD/medication effects    5  Overweight    Plan:  -Discontinue Metoprolol and begin Labetalol 100mg BID (with plan for gradual titration to 300mg BID as tolerated)    -Continue Losartan 100mg daily for renal protection as she is tolerating well, but monitor potassium closely      -Discussed low-K diet and the potential need to begin a loop diuretic if potassium remains high     -Increase Atorvastatin to 10mg Daily  (moderate intensity) given ASCVD risk of 35% with age >69y    -Discussed adding daily walks for weight loss and improved BP control     -Patient to follow up with PCP in 1 week and with cardiology in 1 month for re-evaluation      Asaf Galarza MD  Cardiology Fellow    =======================================

## 2021-08-26 ENCOUNTER — TELEPHONE (OUTPATIENT)
Dept: INTERNAL MEDICINE CLINIC | Facility: CLINIC | Age: 76
End: 2021-08-26

## 2021-08-26 NOTE — TELEPHONE ENCOUNTER
I do not see any mention of this is cardiology's note from yesterday  This was started in July by dr Ever Cespedes and his note states "She has multiple SSRI side effects/intolerances"  She has 32 listed allergies  The cardiologist recommended she see her PCP in 1 week      Please schedule her with dr Ever Cespedes next week to discuss

## 2021-08-26 NOTE — TELEPHONE ENCOUNTER
Patient calling because she saw the cardiologist yesterday , Dr Cleve Jackson and he does not feel like she should be taking the effexor-xr  He stated that she should be taken off the medication and prescribed something similar

## 2021-08-27 ENCOUNTER — OFFICE VISIT (OUTPATIENT)
Dept: OBGYN CLINIC | Facility: HOSPITAL | Age: 76
End: 2021-08-27
Payer: MEDICARE

## 2021-08-27 VITALS
BODY MASS INDEX: 28.39 KG/M2 | HEIGHT: 59 IN | DIASTOLIC BLOOD PRESSURE: 72 MMHG | SYSTOLIC BLOOD PRESSURE: 118 MMHG | WEIGHT: 140.8 LBS | HEART RATE: 74 BPM

## 2021-08-27 DIAGNOSIS — M65.331 TRIGGER FINGER, RIGHT MIDDLE FINGER: ICD-10-CM

## 2021-08-27 DIAGNOSIS — M65.351 TRIGGER FINGER, RIGHT LITTLE FINGER: ICD-10-CM

## 2021-08-27 DIAGNOSIS — M65.341 TRIGGER RING FINGER OF RIGHT HAND: Primary | ICD-10-CM

## 2021-08-27 PROCEDURE — 99214 OFFICE O/P EST MOD 30 MIN: CPT | Performed by: ORTHOPAEDIC SURGERY

## 2021-08-27 RX ORDER — LIDOCAINE HYDROCHLORIDE AND EPINEPHRINE 10; 10 MG/ML; UG/ML
20 INJECTION, SOLUTION INFILTRATION; PERINEURAL ONCE
Status: CANCELLED | OUTPATIENT
Start: 2021-08-27 | End: 2021-08-27

## 2021-08-27 NOTE — PROGRESS NOTES
ASSESSMENT/PLAN:    Assessment:   Right ring trigger finger  Right long trigger finger  Right small trigger finger    Plan:   Trigger Finger Release  right long, ring, and small finger (under local)    Follow Up: After Surgery    To Do Next Visit:  Sutures out      Operative Discussions:     Trigger Finger Release: The anatomy and physiology of trigger finger was discussed with the patient today in the office  Edema and increased contact pressure within the flexor tendons at the A1 pulley can cause pain, crepitation, and limitation of function  Treatment options include resting MP blocking splints to decrease edema, oral anti-inflammatory medications, home or formal therapy exercises, up to 2 steroid injections or surgical release  While majority of patients do respond to conservative treatment, up to 20% may require surgical release  The patient has elected release of the trigger finger  The patient has elected to undergo a release of the A1 pulley (trigger finger)  A small incision will be made over the palmar aspect of the hand, the tendon sheath holding the flexor tendons will be released  In the postoperative period, light activities are allowed immediately, driving is allowed when narcotic medication has stopped, and the incision may get wet after 2 days  Heavy activities (lifting more than approximately 10 pounds) will be allowed after the follow up appointment in 1-2 weeks  While the pain and discomfort within the wrist typically improves rapidly, some residual discomfort may be present for up to 6 weeks  The nodule that is typically palpable in the palmar aspect of the hand will not be removed, as this would necessitate removal of a portion of the flexor tendon, however the catching, clicking, and locking should resolve  Approximate success rate is 98%  The risks and benefits of the procedure were explained to the patient, which include, but are not limited to: Bleeding, infection, recurrence, pain, scar, damage to tendons, damage to nerves, and damage to blood vessels, need for future surgery and complications related to anesthesia  If bony work is done, risks also include malunion and nonunion  These risks, along with alternative conservative treatment options, and postoperative protocols were voiced back and understood by the patient  All questions were answered to the patient's satisfaction  The patient agrees to comply with a standard postoperative protocol, and is willing to proceed  Education was provided via written and auditory forms  There were no barriers to learning  Written handouts regarding wound care, incision and scar care, and general preoperative information, as well as risks and benefits were provided to the patient       _____________________________________________________  CHIEF COMPLAINT:  Chief Complaint   Patient presents with    Right Ring Finger - Follow-up     TF- 1st injection 2/5/21         SUBJECTIVE:  Lolis Purcell is a 66 y o  female who presents for follow up regarding Trigger Finger  right  ring finger  Since last visit, Lolis Purcell has tried steroid injections without relief  Today there is Catching and Locking to the right ring finger      Radiation: None  Associated symptoms: Catching and Locking  Handedness: right  Work status: retired    PAST MEDICAL HISTORY:  Past Medical History:   Diagnosis Date    Benign paroxysmal positional vertigo due to bilateral vestibular disorder 1/28/2020    Breast cancer ([facility] [address] ) 2007    Bilateral mastectomy with chemotherapy    Chronic maxillary sinusitis 7/20/2020    Gastroesophageal reflux disease without esophagitis 1/28/2020    Hypertension     Hypertensive disorder 5/2/2019    Pancreatitis     PONV (postoperative nausea and vomiting)     Popliteal cyst, left 6/30/2020    PUD (peptic ulcer disease) 1/28/2020    Scoliosis     Skin cancer        PAST SURGICAL HISTORY:  Past Surgical History:   Procedure Laterality Date    BASAL CELL CARCINOMA EXCISION      nose    BREAST BIOPSY      BREAST IMPLANT Bilateral     BREAST SURGERY      double mastectomy    CARPAL TUNNEL RELEASE      CATARACT EXTRACTION      DILATION AND CURETTAGE OF UTERUS      GANGLION CYST EXCISION Left     wrist    HERNIA REPAIR      HIP SURGERY      HYSTERECTOMY      KNEE ARTHROSCOPY      KNEE SURGERY      MASTECTOMY Bilateral     NASAL ENDOSCOPY W/ BALLON SINUPLASTY      x4     PLANTAR FASCIA SURGERY Bilateral     HI NASAL/SINUS ENDOSCOPY,RMV TISS MAXILL SINUS Right 2020    Procedure: FUNCTIONAL ENDOSCOPIC SINUS SURGERY (FESS) IMAGED GUIDED, MAXILLARY ANTROSTOM Y  ;SEPTOPLASTY;  Surgeon: Brenda Burnett MD;  Location: [facility];  Service: ENT   [id number]      ROTATOR CUFF REPAIR Bilateral 2004    TOTAL ABDOMINAL HYSTERECTOMY      w RSO    TRIGGER FINGER RELEASE      TUBAL LIGATION         FAMILY HISTORY:  Family History   Problem Relation Age of Onset    No Known Problems Mother     No Known Problems Father     No Known Problems Maternal Grandmother     No Known Problems Maternal Grandfather     No Known Problems Paternal Grandmother     No Known Problems Paternal Grandfather        SOCIAL HISTORY:  Social History     Tobacco Use    Smoking status: Former Smoker     Types: Cigarettes     Quit date:      Years since quittin 6    Smokeless tobacco: Never Used   Vaping Use    Vaping Use: Never used   Substance Use Topics    Alcohol use: Not Currently    Drug use: Never       MEDICATIONS:    Current Outpatient Medications:     acetaminophen (TYLENOL) 500 mg tablet, Take 1 tablet (500 mg total) by mouth every 6 (six) hours as needed for mild pain, Disp: 30 tablet, Rfl: 0    atorvastatin (LIPITOR) 10 mg tablet, Take 0 5 tablets (5 mg total) by mouth daily, Disp: 45 tablet, Rfl: 1    Bacillus Coagulans-Inulin (ALIGN PREBIOTIC-PROBIOTIC PO), Take by mouth daily, Disp: , Rfl:     calcium carbonate (OS-QIANA) 600 MG tablet, Take 600 mg by mouth 2 (two) times a day with meals, Disp: , Rfl:     cholecalciferol (VITAMIN D3) 1,000 units tablet, Take 2,000 Units by mouth daily , Disp: , Rfl:     labetalol (NORMODYNE) 100 mg tablet, Take 1 tablet (100 mg total) by mouth 2 (two) times a day, Disp: 60 tablet, Rfl: 11    latanoprost (XALATAN) 0 005 % ophthalmic solution, Administer 1 drop to both eyes daily, Disp: , Rfl:     losartan (COZAAR) 100 MG tablet, TAKE 1 TABLET(100 MG) BY MOUTH DAILY, Disp: 90 tablet, Rfl: 0    MELATONIN PO, Take 5 mg by mouth daily , Disp: , Rfl:     Multiple Vitamins-Minerals (PRESERVISION/LUTEIN PO), PreserVision Lutein, Disp: , Rfl:     venlafaxine (EFFEXOR-XR) 75 mg 24 hr capsule, Take 1 capsule (75 mg total) by mouth daily with breakfast, Disp: 30 capsule, Rfl: 3    vitamin B-12 (VITAMIN B-12) 1,000 mcg tablet, Take by mouth daily, Disp: , Rfl:     aspirin (ECOTRIN LOW STRENGTH) 81 mg EC tablet, Take 81 mg by mouth daily, Disp: , Rfl:     busPIRone (BUSPAR) 5 mg tablet, Take 1 tablet (5 mg total) by mouth 2 (two) times a day (Patient not taking: Reported on 8/25/2021), Disp: 60 tablet, Rfl: 1    Diclofenac Sodium (VOLTAREN) 1 %, as needed  (Patient not taking: Reported on 8/25/2021), Disp: , Rfl:     ALLERGIES:  Allergies   Allergen Reactions    Dorzolamide Hcl-Timolol Mal Other (See Comments)     Tongue burning    Doxazosin Shortness Of Breath    Gabapentin Other (See Comments)    Lexapro [Escitalopram] Tremor    Lorazepam Hallucinations    Benazepril Cough    Zoloft [Sertraline] Other (See Comments)     Diaphoresis and weight gain      Amlodipine Itching     Excessive wt gain    Amoxicillin-Pot Clavulanate Diarrhea    Buprenorphine Hcl Hallucinations    Carbidopa      Severe muscle spasms    Celecoxib Diarrhea    Chlorthalidone Edema     Itchiness, facial swelling, rash    Clindamycin Diarrhea    Clonidine Other (See Comments)     Burning mouth/tongue    Denosumab     Doxycycline Diarrhea and Nausea Only    Fosamax [Alendronate]      Severe stomach pain    Hydrocodone Hallucinations    Hydrocodone-Acetaminophen Hallucinations    Ibandronic Acid      Acid reflux    Levodopa      svere muscle spasms    Milnacipran      Extreme cold feeling    Pregabalin      sedation    Proline      Arthralgia myalgia    Ropinirole Headache    Rosuvastatin Itching    Tramadol Nausea Only     Severe stomach pain    Trazodone Other (See Comments)     Worsening insomnia     Zenpep [Pancrelipase (Lip-Prot-Amyl)] Diarrhea     svere    Cefdinir Diarrhea and Rash    Rotigotine Rash       REVIEW OF SYSTEMS:  Pertinent items are noted in HPI  A comprehensive review of systems was negative  LABS:  HgA1c: No results found for: HGBA1C  BMP:   Lab Results   Component Value Date    CALCIUM 9 5 07/07/2021    K 5 9 (H) 07/07/2021    CO2 21 07/07/2021     (H) 07/07/2021    BUN 35 (H) 07/07/2021    CREATININE 1 36 (H) 07/07/2021           _____________________________________________________  PHYSICAL EXAMINATION:  Vital signs: /72   Pulse 74   Ht 4' 11" (1 499 m)   Wt 63 9 kg (140 lb 12 8 oz)   BMI 28 44 kg/m²   General: well developed and well nourished, alert, oriented times 3 and appears comfortable  Psychiatric: Normal  HEENT: Trachea Midline, No torticollis  Cardiovascular: No discernable arrhythmia  Pulmonary: No wheezing or stridor  Abdomen: No rebound or guarding  Extremities: No peripheral edema  Skin: No masses, erythema, lacerations, fluctation, ulcerations  Neurovascular: Sensation Intact to the Median, Ulnar, Radial Nerve, Motor Intact to the Median, Ulnar, Radial Nerve and Pulses Intact    MUSCULOSKELETAL EXAMINATION:  right ring, long and small finger:  Positive palpable nodule over the A1 pulley  Positive tenderness to palpation over A1 pulley  Positive catching  Positive clicking  _____________________________________________________  STUDIES REVIEWED:  No Studies to review      PROCEDURES PERFORMED:  Procedures  No Procedures performed today   Scribe Attestation    I,:  Pramod Terry am acting as a scribe while in the presence of the attending physician :       I,:  Jaqui Gee MD personally performed the services described in this documentation    as scribed in my presence :

## 2021-08-27 NOTE — PATIENT INSTRUCTIONS
[address]   [address] [facility]  Willa Hammans, MD  Chief of [address] [place] [address]  [phone]  You have chosen to undergo surgery for your condition  Any surgery is associated with risks of potential complications  Certain medical problems such as smoking, diabetes, thyroid disease, neuropathy, malnutrition or bleeding problems may increase your risk of complications  Complications after surgery are rare but include the following:   Bleeding Infection   Scar Pain   Tenderness Problems with healing   Damage to nerves Damage to blood vessels   Lack of desired results Need for further surgery   Numbness Stiffness   Problems with anesthesia Blood clots   Need for hardware removal (if inserted)    If bony work is done, other risks include:   Delayed bone healing   Lack of bone healing   Bones healing in wrong position    While these risks and complications are rare and infrequent they are still important to know and discuss  If you have any other questions, please let me know  _____________________________________________________________________________________  It can be difficult, but it is possible to get on with your life with only one hand for the first few weeks after your operation  The following are a few suggestions that may be helpful when you're recovering from your hand problem or from your hand surgery  While most of these suggestions are really only applicable if your dominant or your writing hand is affected, some apply to problems involving either hand  Most daily activities can be accomplished with some modifications, rather than the need for store bought devices  Before surgery, if you can:   Ask for help: Have others help you with: childcare, housework, and meals   Practice: Dressing, undressing, using the toilet, brushing your teeth, showering   Prepare: for the first few days after surgery    o Open and re-sealed cans and bottles that you may need   o Open medication containers and leave them easy-to-read open  Make sure you put these medication containers out of reach of children, even if you don't expect children to visit you   o Prepare and think about no-cutmeals-sandwiches, ground meats, etc     It helps to have   In the shower  o Plastic bags and rubber bands to cover bandages-the downing that newspapers come in are good  Also, small trashcan liners will work  Use 2 of these at a time  The other option is an oversized rubber glove that may be purchased at a food store to aid in dishwashing   o A bottle sponge (soft sponge on a long stick)-for the armpit of yourgood hand  o Shower brush  o At [place] in the shower will help you to wash your hair  o A cotton terrycloth bathrobe to aid you in drying your back     In the bathroom  o Toothpaste, shampoo, etc  in a flip top or pump dispenser  o Flossers (dental floss on aYshaped handle)  o Consider an electric razor     In the bedroom  o Back scratcher  o Large sleeve shirts and tops  o Put away clothing which buttons, fastens or snaps in the back, or which uses drawstrings     In the kitchen  o A rubber jar opener Subhash-to help open jars, but also to keep things from sliding around while you are working on them   o Double suction cup pads (the little octopus)-to hold items while you use or wash them  o An electric can opener with a lid magnet strong enough to hold the can in the air-for one-handed use   Consider Hema Morgan and wear haircut  Beth Saldana! Incision & Scar Care   You should keep your bandages dry and clean; change your dressings if you see drainage coming through your dressings   Signs of infection include increased pain, swelling, redness, warmth, and excessive or foul-smelling drainage  Please contact our office if you experience signs of infection   You may wash with soap and water after given permission     Sutures will be removed between 7-14 days after surgery if the wound is healed  Patients who smoke, have diabetes, or have nutritional problems may need longer to heal    Steri-strips may be placed across your incision at this time, which will fall off or peel away   To help prevent infection, do not submerse your incision area for 2-3 weeks (i e  no hot tubs, pools, ocean, dish water, fish ponds, fish tanks, bathtubs)   Swelling, bruising, and numbness are common after surgery  To help reduce these symptoms, keep the area elevated  Scar Care: To improve the appearance of your scar, you can massage the healed area (using circular motions with your fingertip) for 5 minutes 3x a day after your steri-strips peel away  You can use regular hand lotion or Scar zone from the store  You may also use Silicone pads after the stitches are removed (available at the store)  Redness and bumpiness of the scar are expected  These generally improve as healing progresses, but redness can be expected for up to 6-8 months  ** If you have any questions or concerns, please call our office  [phone]  _____________________________________________________________________________________  Pain Management  Pain after an injury or surgery is common and should often be expected  There are many ways to manage and reduce this pain  This often does not include medications or may not exclusively include medication  Each patient, surgery and surgeon are unique, and the approach to management of pain is individual   It is important to try to discuss your concerns and expectations regarding pain with your surgical team before and after surgery  They want to help you get better and have a good patient experience  Your patient experience includes understanding and treating your pain  Here's what you may expect before surgery and how to manage your pain and medications after surgery   Again, the approach to pain management after injury or surgery is individualized, and this is general information  Your surgical team will have more specific recommendations for you  Before using any of the methods explored here, please discuss with your medical team if these pain management methods are appropriate for you  We advise good communication with your team to let them help you achieve the best outcome  Surgery Day  As your surgery begins, your surgeon and anesthesia team may give you medications by mouth, IV, and/or injection  Giving you medication as the surgery progresses not only helps you to decrease pain during the surgery, but it also reduces your pain post-surgery  You may also receive medication in the recovery room after surgery, if needed  In some cases, you will receive prescription pain medication and instructions for its use to use at home in the days following your surgery  You may also receive instructions on using over-the-counter pain medications  It is important to follow these directions carefully, as many over-the counter medications contain some of the same ingredients as prescription pain medications and using them together can result in a dangerous accidental overdose  Post-Surgery Pain Management  While always important to follow your specific postoperative instructions, here are some different methods, outside of medication, that your team may recommend to reduce your pain:   Elevate: Elevating the injured area so it is higher than your heart can reduce swelling and pain  Swelling can increase quickly by putting your hand at your side, and this can make your dressing feel tight  Often, the pain associated with swelling is difficult to control, so it is best to avoid this problem   Take care of your dressing: If your dressing/splint feels tight, and elevation for 10 minutes does not improve the tight sensation, contact your surgical team  It may be recommended that you unravel any tape or elastic wrap and loosen the outer bandage   If this does not help, you may be advised to tear, unravel, or cut the inner layers with blunt tipped scissors  Make sure you are cutting on the opposite side of where your incision is located  When done, you will need to try to rebuild your dressing to keep your wound clean and covered  Before doing any of this, check with your medical team  They may want to be aware of the tight dressing and could have different instructions for loosening the dressing   Keep moving: If allowed by your surgeon, try to frequently move the fingers, wrist, elbow, and/or shoulder that are outside of the splint or cast  You can do this gently and slowly  This improves blood flow, which limits swelling and prevents bandages from feeling tight  It may be uncomfortable to move at first, but the discomfort will often improve with time and frequently improves with motion  Your surgeon will be more specific about what to move and what to rest    Ice the area: Icing the painful area will typically reduce swelling and inflammation and reduce pain  However, there may be certain procedures (such as surgery on arteries, skin grafts or flaps) where ice could be harmful, so consult your surgeon before using ice   Heat the area: If you are in the phase of care where you can remove your dressing or splint, you may be able to try heat  Heat increases blood flow to an area and can help with muscle spasms, muscle soreness and joint pain   Avoid smoking: Chemicals present in cigarettes can increase pain  Reducing or quitting smoking can improve your pain   Consume vitamin C: Consuming 500mg of vitamin C daily for 6 weeks may reduce pain after some injuries  However, it is ascorbic acid, which can upset your stomach if you have heartburn or gastritis  Post-Surgery Medication Management  The pain-management methods listed above are often effective when used in combination with taking medications post-surgery   There are many different classes of medication that can help pain  Some can be purchased over the counter, and some require a prescription  All medicines can have some benefits and some adverse reactions/side effects  Your surgical team will balance these issues to provide a plan for you  Some commonly prescribed medications can include:   Tylenol (Acetaminophen)   Aleve (Naprosyn/naproxen)   Motrin/Advil (Ibuprofen)   Celebrex (Celecoxib)   Toradol (Ketorolac)    When taking medication, keep the following in mind:   It may take 30-60 minutes for your body to absorb the medication after you take it by mouth, so be patient   Longer-acting medications used before bedtime may help you sleep better the first few nights after surgery   The first few nights post-surgery will generally be the toughest    Do not exceed the dose recommended by your physician or combine medications without consulting with your physician  If you are unfamiliar with these medications, your surgeon can specify how much medication you should take, for how long, and how often  Opioids  Opioids are a type of pain medication made from the poppy plant that is used to make opium and heroin  They can be effective in treating pain, but opioids should be used at a last resort, in limited amounts, and for a limited number of days  Use of these medications should only be done under the guidance of your doctor  When taking opioids, you are at risk of becoming dependent on the medicine, and they may become less effective over time  Oxycodone and hydrocodone are two of the most commonly used and effective opioid pain pills  These pills are frequently combined with Tylenol (acetaminophen), but it must be done carefully  Be sure to consult your surgeon before doing so   Your surgeon will give you a customized plan for managing your pain based on your type of surgery, number of procedures, duration of surgery, etc  Keep in mind that many opioids are combined with Tylenol (acetaminophen) already in the pill, so take care to follow your prescribed directions and not to take more opioids or acetaminophen than prescribed  Overdoses of each of these medications can be dangerous and life threatening  Learn more about opioids, including the side effects, how to safely use them, and how to properly dispose of any extras  Following the program below will greatly decrease your post-operative pain  1  Aleve (naproxen) 220 mg and Tylenol Arthritis 650 mg on the afternoon/evening of surgery  Do NOT take Aleve if you have a history of gastric ulcers, uncontrolled reflux or have been told previously by a physician that you should not take anti-inflammatory medications such as Advil/Aleve/Motrin  2  Aleve (naproxen) 220 mg in the morning and afternoon, for about 2-3 days after the surgery; even if you have no pain  You can stop two days after surgery if your hand does not hurt  3  Tylenol Arthritis (or any brand of acetaminophen 8-hour), 650 mg every eight hours, with a maximum dose of 3000 mg per day, for about 2-3 days after the surgery, even if you have no pain  Tylenol Arthritis plus Aleve is a case of 1+1=3, not 2  That is, they work together as a team to make each other stronger a  The maximum amount of Tylenol is 3000 mg per day, which is the same as 4 of the 650 mg pills  Remember; don't substitute any other medication for the Tylenol: don't take Motrin, aspirin, or any other over the counter medication  It must be Tylenol (or any brand of acetaminophen) for it to work as a team  Remember as well that Tylenol Arthritis is taken every 8 hours, the Aleve is twice a day  4  Norco (hydrocodone/acetaminophen) 5/325 mg or a similar medication to assist with sleeping at night, and possibly every 6 hours during the day, ONLY IF NEEDED, for the first few days   You will be given a written prescription, but many patients find they do not need to take any or all of the Norco  Do not take the medication just because it was given to you; only take it if you need it  Remember that Gayle Larkin is an opioid pain medication and can lead to addiction, respiratory sedation, and death  In 2015 over 17,500 Americans  from opioid overdoses and I don't want this to happen to you  Opioid medications can also cause constipation, so please plan for that, as well as possible mental confusion and drowsiness  Do not drive while you are taking this medication  With this protocol, you can expect your post-operative pain to be very manageable  The worst pain only lasts for the first 48 hours and improves significantly after that  By the time you see your surgeon for your post-operative visit you probably will no longer require any pain medication on a daily basis  Important Information about Painkillers  You are being prescribed an opioid pain medication to help with severe pain after surgery  Use the medication sparingly as needed to reduce your pain  The goal is not to be pain-free, but to make the pain more tolerable  If you are able to take non-steroidal anti-inflammatory drugs (NSAIDs), alternate the prescription pain medication with over-the-counter Ibuprofen or Naproxen (if you do not have a history of reflux or stomach ulcers)  This will allow you to take less opioid medication  Also, elevate the hand to reduce swelling and consider applying ice to the affected area for 15 minutes at a time, several times per day  Opioid medication is powerful and has the risk of overdose, abuse, and addiction  Only use the medication as directed by your physician and keep the pills in a safe place  When you no longer need the medication, please dispose of the pills properly as directed below  Allowing someone else to use your opioid prescription is illegal   Also, possible side effects from opioid medications are over-sedation, itching, nausea/vomiting, and constipation   Do not drive a vehicle or operate machinery while taking the pain medications  Drink plenty of fluids and consider a stool softener to prevent constipation  If the pain you are experiencing is not severe, stop taking the opioid pills and only take over-the-counter medications such as Tylenol and Ibuprofen  Disposing of unused pain medications:  (1) Follow pharmacist instructions on the bottle if available, or  (2) Call 0-807.765.6990 for a [facility] authorized collection site in your area, or  (3) If no collection site is available in your area, mix the pills with an undesirable substance such as used coffee grounds, leti litter, or dirt  Place this mixture in a sealed plastic bag  Place the bag in the household garbage  Adapted from the opioid awareness section of the [employer], thanks to Alexia Nguyen and Kenyetta Paula

## 2021-08-30 ENCOUNTER — APPOINTMENT (OUTPATIENT)
Dept: LAB | Facility: IMAGING CENTER | Age: 76
End: 2021-08-30
Payer: MEDICARE

## 2021-08-30 ENCOUNTER — TELEPHONE (OUTPATIENT)
Dept: CARDIOLOGY CLINIC | Facility: CLINIC | Age: 76
End: 2021-08-30

## 2021-08-30 DIAGNOSIS — E87.5 HYPERKALEMIA: ICD-10-CM

## 2021-08-30 LAB
ANION GAP SERPL CALCULATED.3IONS-SCNC: 2 MMOL/L (ref 4–13)
BUN SERPL-MCNC: 17 MG/DL (ref 5–25)
CALCIUM SERPL-MCNC: 9.4 MG/DL (ref 8.3–10.1)
CHLORIDE SERPL-SCNC: 107 MMOL/L (ref 100–108)
CO2 SERPL-SCNC: 27 MMOL/L (ref 21–32)
CREAT SERPL-MCNC: 1.2 MG/DL (ref 0.6–1.3)
GFR SERPL CREATININE-BSD FRML MDRD: 44 ML/MIN/1.73SQ M
GLUCOSE SERPL-MCNC: 77 MG/DL (ref 65–140)
POTASSIUM SERPL-SCNC: 4.4 MMOL/L (ref 3.5–5.3)
SODIUM SERPL-SCNC: 136 MMOL/L (ref 136–145)

## 2021-08-30 PROCEDURE — 36415 COLL VENOUS BLD VENIPUNCTURE: CPT

## 2021-08-30 PROCEDURE — 80048 BASIC METABOLIC PNL TOTAL CA: CPT

## 2021-08-30 NOTE — TELEPHONE ENCOUNTER
Per my [employer] conversation with Dr Tommie Elizabeth -  Pt to discuss meds at PCP visit and Dr Tommie Elizabeth to discuss further at follow up visit on 9/29  Ned Ding Pt verbalized understanding

## 2021-08-30 NOTE — TELEPHONE ENCOUNTER
Pt was seen on 8/25 and Metoprolol was changed to labetalol 100 mg BID  Jaydon Painter said she is unable to tolerate the labetalol  She said her BP and pulse increased, she had severe sinus pain and headaches  Also felt her heart "pounding"  BP was higher on the  Labetalol  150s, 160s over 90s  HR high 90s  She took the last dose on Saturday, and went back on Toprol XL 50 mg     BP now better 138/70, 142/70 once off the labetalol  Next follow up with you on 9/29  Office visit with PCP tomorrow  Please advise if any changes recommended  Will not take the labetalol

## 2021-08-31 ENCOUNTER — OFFICE VISIT (OUTPATIENT)
Dept: INTERNAL MEDICINE CLINIC | Facility: CLINIC | Age: 76
End: 2021-08-31
Payer: MEDICARE

## 2021-08-31 VITALS
WEIGHT: 140 LBS | HEIGHT: 59 IN | OXYGEN SATURATION: 98 % | SYSTOLIC BLOOD PRESSURE: 132 MMHG | BODY MASS INDEX: 28.22 KG/M2 | DIASTOLIC BLOOD PRESSURE: 84 MMHG | TEMPERATURE: 98.3 F | HEART RATE: 66 BPM

## 2021-08-31 DIAGNOSIS — E87.5 HYPERKALEMIA: ICD-10-CM

## 2021-08-31 DIAGNOSIS — I10 ESSENTIAL HYPERTENSION: Primary | ICD-10-CM

## 2021-08-31 DIAGNOSIS — F41.9 ANXIETY: ICD-10-CM

## 2021-08-31 PROCEDURE — 99213 OFFICE O/P EST LOW 20 MIN: CPT | Performed by: INTERNAL MEDICINE

## 2021-08-31 RX ORDER — CEPHALEXIN 500 MG/1
CAPSULE ORAL
COMMUNITY
Start: 2021-07-20

## 2021-08-31 RX ORDER — METOPROLOL SUCCINATE 50 MG/1
50 TABLET, EXTENDED RELEASE ORAL DAILY
Start: 2021-08-31 | End: 2021-09-30 | Stop reason: SDUPTHER

## 2021-08-31 NOTE — ASSESSMENT & PLAN NOTE
Continue venlafaxine 75 mg daily and advise that she start BuSpar however at 2 5 mg twice a day for 1 week then increase to 5 mg twice a day thereafter  Discussed potential adverse effects  She is to contact our office if she develops any side effects

## 2021-08-31 NOTE — PROGRESS NOTES
Assessment/Plan:    Essential hypertension  Currently controlled  Continue losartan 100 mg daily and metoprolol succinate 50 mg daily  Continue follow-up with Cardiology  Labetalol discontinued due to side effects  Anxiety  Continue venlafaxine 75 mg daily and advise that she start BuSpar however at 2 5 mg twice a day for 1 week then increase to 5 mg twice a day thereafter  Discussed potential adverse effects  She is to contact our office if she develops any side effects  Hyperkalemia  Resolved  Diagnoses and all orders for this visit:    Essential hypertension  -     metoprolol succinate (TOPROL-XL) 50 mg 24 hr tablet; Take 1 tablet (50 mg total) by mouth daily    Anxiety    Hyperkalemia    Other orders  -     cephalexin (KEFLEX) 500 mg capsule; TAKE FOUR CAPSULES BY MOUTH 1 HOUR BEFORE VISIT                  Subjective:      Patient ID: Marvin Wolf is a 66 y o  female  Chief Complaint   Patient presents with    Follow-up     Discuss medication that Cardiology wants to change/discontinue medication Venlafaxine        40-year-old female seen today for follow-up of hypertension and anxiety  She recently saw her cardiologist who switched her off metoprolol succinate 50 mg daily to labetalol 100 mg twice a day  Since starting labetalol, she was experiencing palpitations, jitteriness, headaches, sinus pain, and increased anxiety  She stopped taking labetalol and reverted back to her metoprolol succinate and had resolution of her symptoms  Since, her blood pressure has been well controlled and her heart rate is within acceptable range  Anxiety:  Improving  She is currently on venlafaxine 75 mg daily  She was advised by her cardiologist to contact her PCP to consider discontinuing although she cannot recall as to why  She has been feeling better and less anxious since starting venlafaxine    She was also prescribed BuSpar 5 mg twice a day for better control of anxiety however, she has yet to start because she was not having side effects of labetalol  BMP showed a potassium of 4 4, hyperkalemia resolved since discontinue spironolactone  Hypertension  This is a chronic problem  The current episode started more than 1 year ago  The problem is unchanged  The problem is controlled  Pertinent negatives include no chest pain, headaches, palpitations or shortness of breath  Past treatments include beta blockers and angiotensin blockers  The following portions of the patient's history were reviewed and updated as appropriate: allergies, current medications, past family history, past medical history, past social history, past surgical history and problem list     Review of Systems   Constitutional: Negative for activity change, appetite change, chills, diaphoresis, fatigue and fever  HENT: Negative for congestion, postnasal drip, rhinorrhea, sinus pressure, sinus pain, sneezing and sore throat  Eyes: Negative for visual disturbance  Respiratory: Negative for apnea, cough, choking, chest tightness, shortness of breath and wheezing  Cardiovascular: Negative for chest pain, palpitations and leg swelling  Gastrointestinal: Negative for abdominal distention, abdominal pain, anal bleeding, blood in stool, constipation, diarrhea, nausea and vomiting  Endocrine: Negative for cold intolerance and heat intolerance  Genitourinary: Negative for difficulty urinating, dysuria and hematuria  Musculoskeletal: Negative  Skin: Negative  Neurological: Negative for dizziness, weakness, light-headedness, numbness and headaches  Hematological: Negative for adenopathy  Psychiatric/Behavioral: Negative for agitation, sleep disturbance and suicidal ideas  All other systems reviewed and are negative          Past Medical History:   Diagnosis Date    Benign paroxysmal positional vertigo due to bilateral vestibular disorder 1/28/2020    Breast [facility]) 2007    Bilateral mastectomy with chemotherapy    Chronic maxillary sinusitis 7/20/2020    Gastroesophageal reflux disease without esophagitis 1/28/2020    Hypertension     Hypertensive disorder 5/2/2019    Pancreatitis     PONV (postoperative nausea and vomiting)     Popliteal cyst, left 6/30/2020    PUD (peptic ulcer disease) 1/28/2020    Scoliosis     Skin cancer          Current Outpatient Medications:     acetaminophen (TYLENOL) 500 mg tablet, Take 1 tablet (500 mg total) by mouth every 6 (six) hours as needed for mild pain, Disp: 30 tablet, Rfl: 0    aspirin (ECOTRIN LOW STRENGTH) 81 mg EC tablet, Take 81 mg by mouth daily, Disp: , Rfl:     atorvastatin (LIPITOR) 10 mg tablet, Take 0 5 tablets (5 mg total) by mouth daily, Disp: 45 tablet, Rfl: 1    Bacillus Coagulans-Inulin (ALIGN PREBIOTIC-PROBIOTIC PO), Take by mouth daily, Disp: , Rfl:     busPIRone (BUSPAR) 5 mg tablet, Take 1 tablet (5 mg total) by mouth 2 (two) times a day, Disp: 60 tablet, Rfl: 1    calcium carbonate (OS-QIANA) 600 MG tablet, Take 600 mg by mouth 2 (two) times a day with meals, Disp: , Rfl:     cephalexin (KEFLEX) 500 mg capsule, TAKE FOUR CAPSULES BY MOUTH 1 HOUR BEFORE VISIT, Disp: , Rfl:     cholecalciferol (VITAMIN D3) 1,000 units tablet, Take 2,000 Units by mouth daily , Disp: , Rfl:     Diclofenac Sodium (VOLTAREN) 1 %, as needed , Disp: , Rfl:     latanoprost (XALATAN) 0 005 % ophthalmic solution, Administer 1 drop to both eyes daily, Disp: , Rfl:     losartan (COZAAR) 100 MG tablet, TAKE 1 TABLET(100 MG) BY MOUTH DAILY, Disp: 90 tablet, Rfl: 0    MELATONIN PO, Take 5 mg by mouth daily , Disp: , Rfl:     Multiple Vitamins-Minerals (PRESERVISION/LUTEIN PO), PreserVision Lutein, Disp: , Rfl:     venlafaxine (EFFEXOR-XR) 75 mg 24 hr capsule, Take 1 capsule (75 mg total) by mouth daily with breakfast, Disp: 30 capsule, Rfl: 3    vitamin B-12 (VITAMIN B-12) 1,000 mcg tablet, Take by mouth daily, Disp: , Rfl:     metoprolol succinate (TOPROL-XL) 50 mg 24 hr tablet, Take 1 tablet (50 mg total) by mouth daily, Disp: , Rfl:     Allergies   Allergen Reactions    Dorzolamide Hcl-Timolol Mal Other (See Comments)     Tongue burning    Doxazosin Shortness Of Breath    Gabapentin Other (See Comments)    Labetalol Other (See Comments)     Sinus pain, palpitations, headaches      Lexapro [Escitalopram] Tremor    Lorazepam Hallucinations    Benazepril Cough    Zoloft [Sertraline] Other (See Comments)     Diaphoresis and weight gain      Amlodipine Itching     Excessive wt gain    Amoxicillin-Pot Clavulanate Diarrhea    Buprenorphine Hcl Hallucinations    Carbidopa      Severe muscle spasms    Celecoxib Diarrhea    Chlorthalidone Edema     Itchiness, facial swelling, rash    Clindamycin Diarrhea    Clonidine Other (See Comments)     Burning mouth/tongue    Denosumab     Doxycycline Diarrhea and Nausea Only    Fosamax [Alendronate]      Severe stomach pain    Hydrocodone Hallucinations    Hydrocodone-Acetaminophen Hallucinations    Ibandronic Acid      Acid reflux    Levodopa      svere muscle spasms    Milnacipran      Extreme cold feeling    Pregabalin      sedation    Proline      Arthralgia myalgia    Ropinirole Headache    Rosuvastatin Itching    Tramadol Nausea Only     Severe stomach pain    Trazodone Other (See Comments)     Worsening insomnia     Zenpep [Pancrelipase (Lip-Prot-Amyl)] Diarrhea     svere    Cefdinir Diarrhea and Rash    Rotigotine Rash       Social History   Past Surgical History:   Procedure Laterality Date    BASAL CELL CARCINOMA EXCISION      nose    BREAST BIOPSY      BREAST IMPLANT Bilateral     BREAST SURGERY      double mastectomy    CARPAL TUNNEL RELEASE      CATARACT EXTRACTION      DILATION AND CURETTAGE OF UTERUS      GANGLION CYST EXCISION Left     wrist    HERNIA REPAIR      HIP SURGERY      HYSTERECTOMY      KNEE ARTHROSCOPY      KNEE SURGERY      MASTECTOMY Bilateral 2007    NASAL ENDOSCOPY W/ BALLON SINUPLASTY      x4     PLANTAR FASCIA SURGERY Bilateral 1990    MS NASAL/SINUS ENDOSCOPY,RMV TISS MAXILL SINUS Right 7/27/2020    Procedure: FUNCTIONAL ENDOSCOPIC SINUS SURGERY (FESS) IMAGED GUIDED, MAXILLARY ANTROSTOM Y  ;SEPTOPLASTY;  Surgeon: Mu Parker MD;  Location: [facility];  Service: ENT   [address] Bilateral 2004 2005    TOTAL ABDOMINAL HYSTERECTOMY      w RSO    TRIGGER FINGER RELEASE      TUBAL LIGATION       Family History   Problem Relation Age of Onset    No Known Problems Mother     No Known Problems Father     No Known Problems Maternal Grandmother     No Known Problems Maternal Grandfather     No Known Problems Paternal Grandmother     No Known Problems Paternal Grandfather        Objective:  /84 (BP Location: Left arm, Patient Position: Sitting, Cuff Size: Adult)   Pulse 66   Temp 98 3 °F (36 8 °C) (Temporal)   Ht 4' 11" (1 499 m)   Wt 63 5 kg (140 lb)   SpO2 98%   BMI 28 28 kg/m²     Recent Results (from the past 1344 hour(s))   Basic metabolic panel    Collection Time: 07/07/21 10:42 AM   Result Value Ref Range    Sodium 136 136 - 145 mmol/L    Potassium 5 9 (H) 3 5 - 5 3 mmol/L    Chloride 111 (H) 100 - 108 mmol/L    CO2 21 21 - 32 mmol/L    ANION GAP 4 4 - 13 mmol/L    BUN 35 (H) 5 - 25 mg/dL    Creatinine 1 36 (H) 0 60 - 1 30 mg/dL    Glucose, Fasting 93 65 - 99 mg/dL    Calcium 9 5 8 3 - 10 1 mg/dL    eGFR 38 ml/min/1 73sq m   Basic metabolic panel    Collection Time: 08/30/21 11:12 AM   Result Value Ref Range    Sodium 136 136 - 145 mmol/L    Potassium 4 4 3 5 - 5 3 mmol/L    Chloride 107 100 - 108 mmol/L    CO2 27 21 - 32 mmol/L    ANION GAP 2 (L) 4 - 13 mmol/L    BUN 17 5 - 25 mg/dL    Creatinine 1 20 0 60 - 1 30 mg/dL    Glucose 77 65 - 140 mg/dL    Calcium 9 4 8 3 - 10 1 mg/dL    eGFR 44 ml/min/1 73sq m            Physical Exam  Vitals and nursing note reviewed  Constitutional:       General: She is not in acute distress  Appearance: She is well-developed  She is not diaphoretic  HENT:      Head: Normocephalic and atraumatic  Eyes:      General:         Right eye: No discharge  Left eye: No discharge  Conjunctiva/sclera: Conjunctivae normal       Pupils: Pupils are equal, round, and reactive to light  Neck:      Thyroid: No thyromegaly  Vascular: No JVD  Cardiovascular:      Rate and Rhythm: Normal rate and regular rhythm  Heart sounds: Normal heart sounds  No murmur heard  No friction rub  No gallop  Pulmonary:      Effort: Pulmonary effort is normal  No respiratory distress  Breath sounds: Normal breath sounds  No wheezing or rales  Chest:      Chest wall: No tenderness  Abdominal:      General: There is no distension  Palpations: Abdomen is soft  Tenderness: There is no abdominal tenderness  Musculoskeletal:         General: No tenderness or deformity  Normal range of motion  Cervical back: Normal range of motion and neck supple  Lymphadenopathy:      Cervical: No cervical adenopathy  Skin:     General: Skin is warm and dry  Coloration: Skin is not pale  Findings: No erythema or rash  Neurological:      Mental Status: She is alert and oriented to person, place, and time  Cranial Nerves: No cranial nerve deficit  Coordination: Coordination normal    Psychiatric:         Behavior: Behavior normal          Thought Content:  Thought content normal          Judgment: Judgment normal

## 2021-08-31 NOTE — ASSESSMENT & PLAN NOTE
Currently controlled  Continue losartan 100 mg daily and metoprolol succinate 50 mg daily  Continue follow-up with Cardiology  Labetalol discontinued due to side effects

## 2021-09-27 ENCOUNTER — TELEPHONE (OUTPATIENT)
Dept: OTHER | Facility: OTHER | Age: 76
End: 2021-09-27

## 2021-09-27 NOTE — TELEPHONE ENCOUNTER
The patient is interested is getting the flu shot and would like to know if the office is giving them out yet  Also, the patient would like to know how far apart the flu shot and the covid booster have to be from one another because she is interested in that one as well

## 2021-09-27 NOTE — TELEPHONE ENCOUNTER
Patient called back because she did not understand the message left about the flu shot  I told her that Dr Laney Swanson is not giving flu shots until mid October and she can call for a nurse visit for her flu shot   I also passed on that she needed to wait 14 days for the flu shot if she gets the COVID booster or vise versa 14 days after the flu shot for the booster

## 2021-09-27 NOTE — TELEPHONE ENCOUNTER
Lmom for patient she can come in the office for flu vaccine m-f as a nurse visit   And let her know to wait 14 days until she can get covid booster -or- she has to wait 14 days from her booster until she can get flu shot

## 2021-09-29 ENCOUNTER — OFFICE VISIT (OUTPATIENT)
Dept: CARDIOLOGY CLINIC | Facility: CLINIC | Age: 76
End: 2021-09-29
Payer: MEDICARE

## 2021-09-29 VITALS
SYSTOLIC BLOOD PRESSURE: 164 MMHG | HEIGHT: 59 IN | HEART RATE: 72 BPM | DIASTOLIC BLOOD PRESSURE: 80 MMHG | WEIGHT: 142 LBS | BODY MASS INDEX: 28.63 KG/M2

## 2021-09-29 DIAGNOSIS — E78.2 MIXED HYPERLIPIDEMIA: ICD-10-CM

## 2021-09-29 DIAGNOSIS — R63.5 EXCESSIVE WEIGHT GAIN: ICD-10-CM

## 2021-09-29 DIAGNOSIS — I15.9 SECONDARY HYPERTENSION: Primary | ICD-10-CM

## 2021-09-29 PROCEDURE — 99214 OFFICE O/P EST MOD 30 MIN: CPT | Performed by: INTERNAL MEDICINE

## 2021-09-29 RX ORDER — OLMESARTAN MEDOXOMIL 40 MG/1
40 TABLET ORAL DAILY
Qty: 30 TABLET | Refills: 0 | Status: SHIPPED | OUTPATIENT
Start: 2021-09-29 | End: 2021-10-11

## 2021-09-29 NOTE — PROGRESS NOTES
General Cardiology - Outpatient Progress Note   Cortney Brasher 66 y o  female   MRN: [id number]  @ Encounter: [id number]    Sandy Rodriguez MD  Consults    Interval History:   Since last encounter, she was started on a higher dose of statin and attempted a change from metoprolol to labetalol  However, she changed back due to tachycardia and palpitations which resolved as soon as she stopped labetalol  He BP remain poorly controlled at home and in the office with frequent readings in the 059Z systolic  She reports experiencing severe heartburn while taking losartan and weight gain with her metoprolol, even though she has cut off soda and chocolate and is trying to eat healthier  She reports increased anxiety with a recent suicide attempt by her daughter which has been troubling her  She reports no lightheadedness, dizziness, syncope, headache, vision changes, diaphoresis, chest pain, palpitations, shortness of breath, PND, orthopnea, nausea, vomiting, abdominal pain or lower extremity edema  Cardiac History Summary:   Cortney Brasher is a 66y o  year old female with a history of hypertension, hyperlipidemia, pancreatic insufficiency, CKD stage 3B, osteoarthritis, and generalized anxiety who was referred by her primary doctor for management of blood pressure due to intolerance of multiple first line medications  Objective:  Review of Systems  Review of system was conducted and was negative except for as stated in the interval history      Physical Exam:  Vitals: Blood pressure 164/80, pulse 72, height 4' 11" (1 499 m), weight 64 4 kg (142 lb), not currently breastfeeding , Body mass index is 28 68 kg/m²      GEN: Cortney Brasher appears well, alert and oriented x 3, pleasant and cooperative   HEENT:  Normocephalic, atraumatic, anicteric, moist mucous membranes  NECK: No JVD or carotid bruits   HEART: normal rhythm, normal rate, normal S1 and S2, no murmurs, clicks, gallops or rubs   LUNGS: Clear to auscultation bilaterally; no wheezes, rales, or rhonchi; respiration nonlabored   ABDOMEN:  Normoactive bowel sounds, soft, no tenderness, no distention  EXTREMITIES: radial pulses palpable; no edema  NEURO: no gross focal findings; cranial nerves grossly intact   SKIN:  Dry, intact, warm to touch    Home Medications: (Not in a hospital admission)      Current Outpatient Medications:     acetaminophen (TYLENOL) 500 mg tablet, Take 1 tablet (500 mg total) by mouth every 6 (six) hours as needed for mild pain, Disp: 30 tablet, Rfl: 0    aspirin (ECOTRIN LOW STRENGTH) 81 mg EC tablet, Take 81 mg by mouth daily, Disp: , Rfl:     atorvastatin (LIPITOR) 10 mg tablet, Take 0 5 tablets (5 mg total) by mouth daily (Patient taking differently: Take 10 mg by mouth ), Disp: 45 tablet, Rfl: 1    Bacillus Coagulans-Inulin (ALIGN PREBIOTIC-PROBIOTIC PO), Take by mouth daily, Disp: , Rfl:     calcium carbonate (OS-QIANA) 600 MG tablet, Take 600 mg by mouth 2 (two) times a day with meals, Disp: , Rfl:     cephalexin (KEFLEX) 500 mg capsule, TAKE FOUR CAPSULES BY MOUTH 1 HOUR BEFORE VISIT, Disp: , Rfl:     cholecalciferol (VITAMIN D3) 1,000 units tablet, Take 2,000 Units by mouth daily , Disp: , Rfl:     latanoprost (XALATAN) 0 005 % ophthalmic solution, Administer 1 drop to both eyes daily, Disp: , Rfl:     losartan (COZAAR) 100 MG tablet, TAKE 1 TABLET(100 MG) BY MOUTH DAILY, Disp: 90 tablet, Rfl: 0    MELATONIN PO, Take 5 mg by mouth daily , Disp: , Rfl:     metoprolol succinate (TOPROL-XL) 50 mg 24 hr tablet, Take 1 tablet (50 mg total) by mouth daily, Disp: , Rfl:     Multiple Vitamins-Minerals (PRESERVISION/LUTEIN PO), PreserVision Lutein, Disp: , Rfl:     venlafaxine (EFFEXOR-XR) 75 mg 24 hr capsule, Take 1 capsule (75 mg total) by mouth daily with breakfast, Disp: 30 capsule, Rfl: 3    vitamin B-12 (VITAMIN B-12) 1,000 mcg tablet, Take by mouth daily, Disp: , Rfl:     busPIRone (BUSPAR) 5 mg tablet, Take 1 tablet (5 mg total) by mouth 2 (two) times a day (Patient not taking: Reported on 9/29/2021), Disp: 60 tablet, Rfl: 1    Diclofenac Sodium (VOLTAREN) 1 %, as needed  (Patient not taking: Reported on 9/29/2021), Disp: , Rfl:     Labs & Results:  No results found for: TROPONINI  Lab Results   Component Value Date    TRIG 124 07/01/2021    TRIG 152 (H) 08/25/2020    HDL 63 07/01/2021    HDL 65 08/25/2020    LDLCALC 115 (H) 07/01/2021    LDLCALC 157 (H) 08/25/2020     Lab Results   Component Value Date    K 4 4 08/30/2021    CO2 27 08/30/2021     08/30/2021    BUN 17 08/30/2021    CREATININE 1 20 08/30/2021    ALT 33 07/01/2021    AST 18 07/01/2021     Lab Results   Component Value Date    WBC 5 47 07/01/2021    HGB 12 7 07/01/2021    HCT 41 9 07/01/2021    MCV 98 07/01/2021     07/01/2021     No results found for: INR    Previous STRESS TEST:  No results found for this or any previous visit  No results found for this or any previous visit  No results found for this or any previous visit  Previous Cath/PCI:  No results found for this or any previous visit  ECHO:  No results found for this or any previous visit  No results found for this or any previous visit  Assessment/Plan     Poorly controlled Hypertension    Hyperlipidemia    Patient reports side effects with multiple first and second line anti-hypertensives including chlorthalidone, amlodipine, losartan, metoprolol, and labetalol  I suspect much of her hypertension is driven by anxiety which is exacerbated by side effects from the antihypertensives and antidepressant medications she is on  She reports having paradoxical reactions to medications such as tachycardia from beta blockers and edema from diuretics  Venlafaxine as a norepinephrine reuptake inhibitor would have a negative effect on her hypertension and if possible an alternative should be identified       On discussion with the patient, her mother had been able to tolerate lisinopril well, despite having had poor medication tolerances  Will try changing losartan 100mg to the newer generation Olmesartan 40mg daily to test for better response  Will repeat LDL, BMP, and check TSH  Follow up in 2 months       Emmett Silverman MD  Cardiology Fellow

## 2021-09-30 ENCOUNTER — OFFICE VISIT (OUTPATIENT)
Dept: INTERNAL MEDICINE CLINIC | Facility: CLINIC | Age: 76
End: 2021-09-30
Payer: MEDICARE

## 2021-09-30 VITALS
DIASTOLIC BLOOD PRESSURE: 84 MMHG | OXYGEN SATURATION: 99 % | HEIGHT: 59 IN | SYSTOLIC BLOOD PRESSURE: 180 MMHG | WEIGHT: 140.8 LBS | BODY MASS INDEX: 28.39 KG/M2 | TEMPERATURE: 98.2 F | HEART RATE: 72 BPM

## 2021-09-30 DIAGNOSIS — N64.4 ACUTE BREAST PAIN: ICD-10-CM

## 2021-09-30 DIAGNOSIS — I10 ESSENTIAL HYPERTENSION: Primary | ICD-10-CM

## 2021-09-30 DIAGNOSIS — Z98.890 HISTORY OF RECONSTRUCTION OF BOTH BREASTS: ICD-10-CM

## 2021-09-30 DIAGNOSIS — E78.2 MIXED HYPERLIPIDEMIA: ICD-10-CM

## 2021-09-30 DIAGNOSIS — F51.01 PRIMARY INSOMNIA: ICD-10-CM

## 2021-09-30 DIAGNOSIS — Z90.13 HISTORY OF MASTECTOMY, BILATERAL: ICD-10-CM

## 2021-09-30 DIAGNOSIS — F41.9 ANXIETY: ICD-10-CM

## 2021-09-30 PROBLEM — K86.1 OTHER CHRONIC PANCREATITIS (HCC): Status: ACTIVE | Noted: 2021-09-30

## 2021-09-30 PROCEDURE — 99215 OFFICE O/P EST HI 40 MIN: CPT | Performed by: NURSE PRACTITIONER

## 2021-09-30 RX ORDER — HYDROXYZINE HYDROCHLORIDE 10 MG/1
10 TABLET, FILM COATED ORAL
Qty: 90 TABLET | Refills: 1 | Status: SHIPPED | OUTPATIENT
Start: 2021-09-30

## 2021-09-30 RX ORDER — AMLODIPINE BESYLATE 5 MG/1
5 TABLET ORAL DAILY
Qty: 90 TABLET | Refills: 1 | Status: SHIPPED | OUTPATIENT
Start: 2021-09-30 | End: 2021-10-11 | Stop reason: SDUPTHER

## 2021-09-30 RX ORDER — METOPROLOL SUCCINATE 50 MG/1
50 TABLET, EXTENDED RELEASE ORAL DAILY
Qty: 90 TABLET | Refills: 1 | Status: SHIPPED | OUTPATIENT
Start: 2021-09-30

## 2021-09-30 RX ORDER — ATORVASTATIN CALCIUM 10 MG/1
10 TABLET, FILM COATED ORAL DAILY
Qty: 90 TABLET | Refills: 1 | Status: SHIPPED | OUTPATIENT
Start: 2021-09-30

## 2021-09-30 NOTE — PATIENT INSTRUCTIONS
Start Buspar twice daily  Start effexor every other day for 1 week, then discontinue    Continue olmesartan 40mg daily in the morning  Start amlodipine 5mg daily in the morning   Continue metoprolol 50mg before bed     Go for breast US now

## 2021-09-30 NOTE — PROGRESS NOTES
Assessment/Plan:    Problem List Items Addressed This Visit        Cardiovascular and Mediastinum    Essential hypertension - Primary     Continue Olmesartan 40mg daily and Toprol-XL 50mg daily   Start amlodipine 5mg daily  Will wean off effexor   Close follow up in 1 week          Relevant Medications    metoprolol succinate (TOPROL-XL) 50 mg 24 hr tablet    amLODIPine (NORVASC) 5 mg tablet       Other    Hyperlipidemia    Relevant Medications    atorvastatin (LIPITOR) 10 mg tablet    Anxiety     Wean off Effexor, take every other day for 1 week and then stop  Restart Buspar 5mg twice daily   Follow up in 1 week  Referral given to psychiatry          Relevant Orders    Ambulatory referral to Psychiatry    Acute breast pain     Bilateral breast pain, right > left  Hx of double mastectomy in 2008 with reconstruction, hx of leaking right breast implant approx 5 years agos per patient  Breast exam reveals bilateral breast tenderness, right breast with palpable fluid collection at 6-7olcock position  Will check STAT US          Relevant Orders    US breast left limited (diagnostic)    US breast right limited (diagnostic)      Other Visit Diagnoses     History of mastectomy, bilateral        Relevant Orders    US breast left limited (diagnostic)    US breast right limited (diagnostic)    History of reconstruction of both breasts        Relevant Orders    US breast left limited (diagnostic)    US breast right limited (diagnostic)    Primary insomnia        Relevant Medications    hydrOXYzine HCL (ATARAX) 10 mg tablet          M*Modal software was used to dictate this note  It may contain errors with dictating incorrect words or incorrect spelling  Please contact the provider directly with any questions  Subjective:      Patient ID: Cortney Brasher is a 66 y o  female  HPI    Patient presents today with concern for bilateral breast pain  She has PMH of breast cancer with a double mastectomy in 2007   She later had reconstruction  A few years later her right breast implant was leaking which was surgically replaced  She then followed with her plastic surgeon annually but moved here two years ago from [facility] and has not had follow up for her breasts since  One month ago she started to notice bilateral breast pain, but significantly worse in the right breast  Pain is constant  Described as a burning pain  She states when she palpates her right breast she can feel fluid under the skin  She has not been able to wear a bra due to the pain  No leakage from the breast      She states that her BP has been running high and having severe headaches  Home readings have been running 180s/90s  She recently started effexor in the middle of July   She saw cardiology yesterday and felt that her effexor may be contributing to hypertension  He recommended discontinuing this medication   He switched her from losartan to olmesartan 40mg  Her anxiety has not been controlled  She stopped the buspar due to her tremors worsening as well as her irritability  She has been off the medication for at least 3 weeks with no improvement in symptoms  The following portions of the patient's history were reviewed and updated as appropriate: allergies, current medications, past family history, past medical history, past social history, past surgical history and problem list     Review of Systems   Constitutional: Negative for chills and fever  Eyes: Negative for visual disturbance  Respiratory: Negative for cough, chest tightness and shortness of breath  Cardiovascular: Negative for chest pain, palpitations and leg swelling  Genitourinary:        Bilateral breast pain   Neurological: Positive for headaches  Psychiatric/Behavioral: Positive for sleep disturbance  The patient is nervous/anxious            Past Medical History:   Diagnosis Date    Benign paroxysmal positional vertigo due to bilateral vestibular disorder 1/28/2020  Breast cancer ([facility] [address] ) 2007    Bilateral mastectomy with chemotherapy    Chronic maxillary sinusitis 7/20/2020    Gastroesophageal reflux disease without esophagitis 1/28/2020    Hypertension     Hypertensive disorder 5/2/2019    Pancreatitis     PONV (postoperative nausea and vomiting)     Popliteal cyst, left 6/30/2020    PUD (peptic ulcer disease) 1/28/2020    Scoliosis     Skin cancer          Current Outpatient Medications:     aspirin (ECOTRIN LOW STRENGTH) 81 mg EC tablet, Take 81 mg by mouth daily, Disp: , Rfl:     atorvastatin (LIPITOR) 10 mg tablet, Take 1 tablet (10 mg total) by mouth daily, Disp: 90 tablet, Rfl: 1    Bacillus Coagulans-Inulin (ALIGN PREBIOTIC-PROBIOTIC PO), Take by mouth daily, Disp: , Rfl:     busPIRone (BUSPAR) 5 mg tablet, Take 1 tablet (5 mg total) by mouth 2 (two) times a day, Disp: 60 tablet, Rfl: 1    calcium carbonate (OS-QIANA) 600 MG tablet, Take 600 mg by mouth 2 (two) times a day with meals, Disp: , Rfl:     cholecalciferol (VITAMIN D3) 1,000 units tablet, Take 2,000 Units by mouth daily , Disp: , Rfl:     Diclofenac Sodium (VOLTAREN) 1 %, as needed , Disp: , Rfl:     latanoprost (XALATAN) 0 005 % ophthalmic solution, Administer 1 drop to both eyes daily, Disp: , Rfl:     MELATONIN PO, Take 5 mg by mouth daily , Disp: , Rfl:     metoprolol succinate (TOPROL-XL) 50 mg 24 hr tablet, Take 1 tablet (50 mg total) by mouth daily, Disp: 90 tablet, Rfl: 1    Multiple Vitamins-Minerals (PRESERVISION/LUTEIN PO), PreserVision Lutein, Disp: , Rfl:     olmesartan (BENICAR) 40 mg tablet, Take 1 tablet (40 mg total) by mouth daily, Disp: 30 tablet, Rfl: 0    venlafaxine (EFFEXOR-XR) 75 mg 24 hr capsule, Take 1 capsule (75 mg total) by mouth daily with breakfast, Disp: 30 capsule, Rfl: 3    vitamin B-12 (VITAMIN B-12) 1,000 mcg tablet, Take by mouth daily, Disp: , Rfl:     acetaminophen (TYLENOL) 500 mg tablet, Take 1 tablet (500 mg total) by mouth every 6 (six) hours as needed for mild pain (Patient not taking: Reported on 9/30/2021), Disp: 30 tablet, Rfl: 0    amLODIPine (NORVASC) 5 mg tablet, Take 1 tablet (5 mg total) by mouth daily, Disp: 90 tablet, Rfl: 1    cephalexin (KEFLEX) 500 mg capsule, TAKE FOUR CAPSULES BY MOUTH 1 HOUR BEFORE VISIT (Patient not taking: Reported on 9/30/2021), Disp: , Rfl:     hydrOXYzine HCL (ATARAX) 10 mg tablet, Take 1 tablet (10 mg total) by mouth daily at bedtime, Disp: 90 tablet, Rfl: 1    Allergies   Allergen Reactions    Dorzolamide Hcl-Timolol Mal Other (See Comments)     Tongue burning    Doxazosin Shortness Of Breath    Gabapentin Other (See Comments)    Labetalol Other (See Comments)     Sinus pain, palpitations, headaches      Lexapro [Escitalopram] Tremor    Lorazepam Hallucinations    Benazepril Cough    Zoloft [Sertraline] Other (See Comments)     Diaphoresis and weight gain      Amlodipine Itching     Excessive wt gain    Amoxicillin-Pot Clavulanate Diarrhea    Buprenorphine Hcl Hallucinations    Carbidopa      Severe muscle spasms    Celecoxib Diarrhea    Chlorthalidone Edema     Itchiness, facial swelling, rash    Clindamycin Diarrhea    Clonidine Other (See Comments)     Burning mouth/tongue    Denosumab     Doxycycline Diarrhea and Nausea Only    Fosamax [Alendronate]      Severe stomach pain    Hydrocodone Hallucinations    Hydrocodone-Acetaminophen Hallucinations    Ibandronic Acid      Acid reflux    Levodopa      svere muscle spasms    Milnacipran      Extreme cold feeling    Pregabalin      sedation    Proline      Arthralgia myalgia    Ropinirole Headache    Rosuvastatin Itching    Tramadol Nausea Only     Severe stomach pain    Trazodone Other (See Comments)     Worsening insomnia     Zenpep [Pancrelipase (Lip-Prot-Amyl)] Diarrhea     svere    Cefdinir Diarrhea and Rash    Rotigotine Rash       Social History   Past Surgical History:   Procedure Laterality Date    BASAL CELL CARCINOMA EXCISION      nose    BREAST BIOPSY      BREAST IMPLANT Bilateral     BREAST SURGERY      double mastectomy    CARPAL TUNNEL RELEASE      CATARACT EXTRACTION      DILATION AND CURETTAGE OF UTERUS      GANGLION CYST EXCISION Left     wrist    HERNIA REPAIR      HIP SURGERY      HYSTERECTOMY      KNEE ARTHROSCOPY      KNEE SURGERY      MASTECTOMY Bilateral 2007    NASAL ENDOSCOPY W/ BALLON SINUPLASTY      x4     PLANTAR FASCIA SURGERY Bilateral 1990    RI NASAL/SINUS ENDOSCOPY,RMV TISS MAXILL SINUS Right 7/27/2020    Procedure: FUNCTIONAL ENDOSCOPIC SINUS SURGERY (FESS) IMAGED GUIDED, MAXILLARY ANTROSTOM Y  ;SEPTOPLASTY;  Surgeon: Tiana Meyer MD;  [facility];  Service: ENT   [address]      ROTATOR CUFF REPAIR Bilateral 2004 2005    TOTAL ABDOMINAL HYSTERECTOMY      w RSO    TRIGGER FINGER RELEASE      TUBAL LIGATION       Family History   Problem Relation Age of Onset    No Known Problems Mother     No Known Problems Father     No Known Problems Maternal Grandmother     No Known Problems Maternal Grandfather     No Known Problems Paternal Grandmother     No Known Problems Paternal Grandfather        Objective:  BP (!) 180/84 (BP Location: Left arm, Patient Position: Sitting, Cuff Size: Standard)   Pulse 72   Temp 98 2 °F (36 8 °C) (Temporal)   Ht 4' 11" (1 499 m)   Wt 63 9 kg (140 lb 12 8 oz)   SpO2 99%   BMI 28 44 kg/m²      Physical Exam  Constitutional:       Appearance: Normal appearance  She is not diaphoretic  HENT:      Head: Normocephalic and atraumatic  Eyes:      Extraocular Movements: Extraocular movements intact  Conjunctiva/sclera: Conjunctivae normal       Pupils: Pupils are equal, round, and reactive to light  Cardiovascular:      Rate and Rhythm: Normal rate and regular rhythm  Heart sounds: Normal heart sounds  Pulmonary:      Effort: Pulmonary effort is normal  No respiratory distress  Breath sounds: Normal breath sounds  No wheezing, rhonchi or rales  Chest:      Breasts:         Right: Tenderness present  No nipple discharge  Left: Tenderness present  No nipple discharge  Musculoskeletal:      Right lower leg: No edema  Left lower leg: No edema  Neurological:      Mental Status: She is alert and oriented to person, place, and time  Mental status is at baseline  Psychiatric:         Mood and Affect: Mood is anxious

## 2021-10-01 ENCOUNTER — HOSPITAL ENCOUNTER (OUTPATIENT)
Dept: ULTRASOUND IMAGING | Facility: CLINIC | Age: 76
Discharge: HOME | End: 2021-10-01
Payer: MEDICARE

## 2021-10-01 VITALS — HEIGHT: 59 IN | BODY MASS INDEX: 28.22 KG/M2 | WEIGHT: 140 LBS

## 2021-10-01 DIAGNOSIS — N64.4 MASTODYNIA: ICD-10-CM

## 2021-10-01 DIAGNOSIS — Z90.13 HISTORY OF MASTECTOMY, BILATERAL: ICD-10-CM

## 2021-10-01 DIAGNOSIS — Z98.890 HISTORY OF RECONSTRUCTION OF BOTH BREASTS: ICD-10-CM

## 2021-10-01 DIAGNOSIS — Z90.13 ACQUIRED ABSENCE OF BILATERAL BREASTS AND NIPPLES: ICD-10-CM

## 2021-10-01 DIAGNOSIS — N64.4 ACUTE BREAST PAIN: ICD-10-CM

## 2021-10-01 DIAGNOSIS — Z98.890 OTHER SPECIFIED POSTPROCEDURAL STATES: ICD-10-CM

## 2021-10-01 PROCEDURE — 76642 ULTRASOUND BREAST LIMITED: CPT

## 2021-10-01 NOTE — ASSESSMENT & PLAN NOTE
Continue Olmesartan 40mg daily and Toprol-XL 50mg daily   Start amlodipine 5mg daily  Will wean off effexor   Close follow up in 1 week

## 2021-10-01 NOTE — ASSESSMENT & PLAN NOTE
Bilateral breast pain, right > left  Hx of double mastectomy in 2008 with reconstruction, hx of leaking right breast implant approx 5 years agos per patient  Breast exam reveals bilateral breast tenderness, right breast with palpable fluid collection at [facility] position  Will check STAT US

## 2021-10-01 NOTE — ASSESSMENT & PLAN NOTE
Wean off Effexor, take every other day for 1 week and then stop  Restart Buspar 5mg twice daily   Follow up in 1 week  Referral given to psychiatry

## 2021-10-06 ENCOUNTER — APPOINTMENT (OUTPATIENT)
Dept: LAB | Facility: IMAGING CENTER | Age: 76
End: 2021-10-06
Payer: MEDICARE

## 2021-10-06 DIAGNOSIS — I15.9 SECONDARY HYPERTENSION: ICD-10-CM

## 2021-10-06 LAB
ANION GAP SERPL CALCULATED.3IONS-SCNC: 6 MMOL/L (ref 4–13)
BUN SERPL-MCNC: 16 MG/DL (ref 5–25)
CALCIUM SERPL-MCNC: 10 MG/DL (ref 8.3–10.1)
CHLORIDE SERPL-SCNC: 108 MMOL/L (ref 100–108)
CHOLEST SERPL-MCNC: 201 MG/DL (ref 50–200)
CO2 SERPL-SCNC: 27 MMOL/L (ref 21–32)
CREAT SERPL-MCNC: 1.12 MG/DL (ref 0.6–1.3)
GFR SERPL CREATININE-BSD FRML MDRD: 47 ML/MIN/1.73SQ M
GLUCOSE P FAST SERPL-MCNC: 100 MG/DL (ref 65–99)
HDLC SERPL-MCNC: 70 MG/DL
LDLC SERPL CALC-MCNC: 110 MG/DL (ref 0–100)
POTASSIUM SERPL-SCNC: 4.3 MMOL/L (ref 3.5–5.3)
SODIUM SERPL-SCNC: 141 MMOL/L (ref 136–145)
TRIGL SERPL-MCNC: 105 MG/DL
TSH SERPL DL<=0.05 MIU/L-ACNC: 2.45 UIU/ML (ref 0.36–3.74)

## 2021-10-06 PROCEDURE — 84443 ASSAY THYROID STIM HORMONE: CPT | Performed by: STUDENT IN AN ORGANIZED HEALTH CARE EDUCATION/TRAINING PROGRAM

## 2021-10-06 PROCEDURE — 80048 BASIC METABOLIC PNL TOTAL CA: CPT

## 2021-10-06 PROCEDURE — 36415 COLL VENOUS BLD VENIPUNCTURE: CPT

## 2021-10-06 PROCEDURE — 80061 LIPID PANEL: CPT

## 2021-10-07 ENCOUNTER — IMMUNIZATIONS (OUTPATIENT)
Dept: FAMILY MEDICINE CLINIC | Facility: HOSPITAL | Age: 76
End: 2021-10-07

## 2021-10-07 DIAGNOSIS — Z23 ENCOUNTER FOR IMMUNIZATION: Primary | ICD-10-CM

## 2021-10-07 PROCEDURE — 91300 SARS-COV-2 / COVID-19 MRNA VACCINE (PFIZER-BIONTECH) 30 MCG: CPT

## 2021-10-07 PROCEDURE — 0001A SARS-COV-2 / COVID-19 MRNA VACCINE (PFIZER-BIONTECH) 30 MCG: CPT

## 2021-10-11 ENCOUNTER — OFFICE VISIT (OUTPATIENT)
Dept: INTERNAL MEDICINE CLINIC | Facility: CLINIC | Age: 76
End: 2021-10-11
Payer: MEDICARE

## 2021-10-11 ENCOUNTER — PATIENT OUTREACH (OUTPATIENT)
Dept: INTERNAL MEDICINE CLINIC | Facility: CLINIC | Age: 76
End: 2021-10-11

## 2021-10-11 VITALS
TEMPERATURE: 98.4 F | DIASTOLIC BLOOD PRESSURE: 90 MMHG | HEIGHT: 59 IN | OXYGEN SATURATION: 99 % | WEIGHT: 142.2 LBS | HEART RATE: 84 BPM | SYSTOLIC BLOOD PRESSURE: 165 MMHG | BODY MASS INDEX: 28.67 KG/M2

## 2021-10-11 DIAGNOSIS — F41.9 ANXIETY: Primary | ICD-10-CM

## 2021-10-11 DIAGNOSIS — I10 ESSENTIAL HYPERTENSION: ICD-10-CM

## 2021-10-11 DIAGNOSIS — F33.9 DEPRESSION, RECURRENT (HCC): ICD-10-CM

## 2021-10-11 DIAGNOSIS — N64.4 ACUTE BREAST PAIN: ICD-10-CM

## 2021-10-11 DIAGNOSIS — Z90.13 HISTORY OF MASTECTOMY, BILATERAL: ICD-10-CM

## 2021-10-11 DIAGNOSIS — Z98.890 HISTORY OF RECONSTRUCTION OF BOTH BREASTS: ICD-10-CM

## 2021-10-11 PROCEDURE — 99214 OFFICE O/P EST MOD 30 MIN: CPT | Performed by: INTERNAL MEDICINE

## 2021-10-11 RX ORDER — BUSPIRONE HYDROCHLORIDE 15 MG/1
15 TABLET ORAL 2 TIMES DAILY
Start: 2021-10-11 | End: 2021-10-20 | Stop reason: SDUPTHER

## 2021-10-11 RX ORDER — AMLODIPINE BESYLATE 10 MG/1
10 TABLET ORAL DAILY
Start: 2021-10-11 | End: 2021-10-25

## 2021-10-12 ENCOUNTER — PATIENT OUTREACH (OUTPATIENT)
Dept: INTERNAL MEDICINE CLINIC | Facility: CLINIC | Age: 76
End: 2021-10-12

## 2021-10-12 ENCOUNTER — TELEPHONE (OUTPATIENT)
Dept: GASTROENTEROLOGY | Facility: CLINIC | Age: 76
End: 2021-10-12

## 2021-10-15 ENCOUNTER — TELEPHONE (OUTPATIENT)
Dept: INTERNAL MEDICINE CLINIC | Facility: CLINIC | Age: 76
End: 2021-10-15

## 2021-10-16 ENCOUNTER — OFFICE VISIT (OUTPATIENT)
Dept: URGENT CARE | Age: 76
End: 2021-10-16
Payer: MEDICARE

## 2021-10-16 VITALS
RESPIRATION RATE: 28 BRPM | WEIGHT: 142 LBS | BODY MASS INDEX: 28.68 KG/M2 | OXYGEN SATURATION: 96 % | TEMPERATURE: 98.9 F | HEART RATE: 87 BPM

## 2021-10-16 DIAGNOSIS — J20.8 ACUTE BACTERIAL BRONCHITIS: Primary | ICD-10-CM

## 2021-10-16 DIAGNOSIS — R05.9 COUGH: ICD-10-CM

## 2021-10-16 DIAGNOSIS — B96.89 ACUTE BACTERIAL BRONCHITIS: Primary | ICD-10-CM

## 2021-10-16 PROCEDURE — U0003 INFECTIOUS AGENT DETECTION BY NUCLEIC ACID (DNA OR RNA); SEVERE ACUTE RESPIRATORY SYNDROME CORONAVIRUS 2 (SARS-COV-2) (CORONAVIRUS DISEASE [COVID-19]), AMPLIFIED PROBE TECHNIQUE, MAKING USE OF HIGH THROUGHPUT TECHNOLOGIES AS DESCRIBED BY CMS-2020-01-R: HCPCS | Performed by: PHYSICIAN ASSISTANT

## 2021-10-16 PROCEDURE — 99213 OFFICE O/P EST LOW 20 MIN: CPT | Performed by: PHYSICIAN ASSISTANT

## 2021-10-16 PROCEDURE — G0463 HOSPITAL OUTPT CLINIC VISIT: HCPCS | Performed by: PHYSICIAN ASSISTANT

## 2021-10-16 PROCEDURE — U0005 INFEC AGEN DETEC AMPLI PROBE: HCPCS | Performed by: PHYSICIAN ASSISTANT

## 2021-10-16 RX ORDER — AZITHROMYCIN 250 MG/1
TABLET, FILM COATED ORAL
Qty: 6 TABLET | Refills: 0 | Status: SHIPPED | OUTPATIENT
Start: 2021-10-16 | End: 2021-10-20

## 2021-10-16 RX ORDER — BENZONATATE 200 MG/1
200 CAPSULE ORAL 3 TIMES DAILY PRN
Qty: 20 CAPSULE | Refills: 0 | Status: SHIPPED | OUTPATIENT
Start: 2021-10-16 | End: 2021-10-25 | Stop reason: SDUPTHER

## 2021-10-16 RX ORDER — ALBUTEROL SULFATE 90 UG/1
2 AEROSOL, METERED RESPIRATORY (INHALATION) EVERY 6 HOURS PRN
Qty: 18 G | Refills: 0 | Status: SHIPPED | OUTPATIENT
Start: 2021-10-16 | End: 2021-10-25 | Stop reason: SDUPTHER

## 2021-10-17 LAB — SARS-COV-2 RNA RESP QL NAA+PROBE: NEGATIVE

## 2021-10-20 ENCOUNTER — OFFICE VISIT (OUTPATIENT)
Dept: INTERNAL MEDICINE CLINIC | Age: 76
End: 2021-10-20
Payer: MEDICARE

## 2021-10-20 ENCOUNTER — HOSPITAL ENCOUNTER (OUTPATIENT)
Dept: RADIOLOGY | Facility: IMAGING CENTER | Age: 76
Discharge: HOME | End: 2021-10-20
Payer: MEDICARE

## 2021-10-20 VITALS
OXYGEN SATURATION: 100 % | SYSTOLIC BLOOD PRESSURE: 148 MMHG | WEIGHT: 146.2 LBS | BODY MASS INDEX: 29.48 KG/M2 | HEIGHT: 59 IN | DIASTOLIC BLOOD PRESSURE: 82 MMHG | TEMPERATURE: 98.8 F | HEART RATE: 78 BPM

## 2021-10-20 DIAGNOSIS — R60.0 PEDAL EDEMA: ICD-10-CM

## 2021-10-20 DIAGNOSIS — R09.89 RALES: ICD-10-CM

## 2021-10-20 DIAGNOSIS — I65.21 STENOSIS OF RIGHT CAROTID ARTERY: ICD-10-CM

## 2021-10-20 DIAGNOSIS — R60.0 PEDAL EDEMA: Primary | ICD-10-CM

## 2021-10-20 DIAGNOSIS — J40 BRONCHITIS: ICD-10-CM

## 2021-10-20 DIAGNOSIS — F41.9 ANXIETY: ICD-10-CM

## 2021-10-20 DIAGNOSIS — I10 ESSENTIAL HYPERTENSION: ICD-10-CM

## 2021-10-20 PROCEDURE — 71046 X-RAY EXAM CHEST 2 VIEWS: CPT

## 2021-10-20 PROCEDURE — 99214 OFFICE O/P EST MOD 30 MIN: CPT | Performed by: INTERNAL MEDICINE

## 2021-10-20 RX ORDER — BUSPIRONE HYDROCHLORIDE 15 MG/1
15 TABLET ORAL 2 TIMES DAILY
Qty: 60 TABLET | Refills: 1 | Status: SHIPPED | OUTPATIENT
Start: 2021-10-20 | End: 2021-11-29

## 2021-10-20 RX ORDER — BUSPIRONE HYDROCHLORIDE 5 MG/1
TABLET ORAL
COMMUNITY
Start: 2021-10-11 | End: 2021-10-20

## 2021-10-25 ENCOUNTER — TELEPHONE (OUTPATIENT)
Dept: OBGYN CLINIC | Facility: HOSPITAL | Age: 76
End: 2021-10-25

## 2021-10-25 ENCOUNTER — OFFICE VISIT (OUTPATIENT)
Dept: INTERNAL MEDICINE CLINIC | Facility: CLINIC | Age: 76
End: 2021-10-25
Payer: MEDICARE

## 2021-10-25 VITALS
OXYGEN SATURATION: 99 % | RESPIRATION RATE: 20 BRPM | HEIGHT: 59 IN | TEMPERATURE: 99.3 F | WEIGHT: 145.4 LBS | DIASTOLIC BLOOD PRESSURE: 100 MMHG | BODY MASS INDEX: 29.31 KG/M2 | HEART RATE: 75 BPM | SYSTOLIC BLOOD PRESSURE: 152 MMHG

## 2021-10-25 DIAGNOSIS — F41.9 ANXIETY: Primary | ICD-10-CM

## 2021-10-25 DIAGNOSIS — B96.89 ACUTE BACTERIAL BRONCHITIS: ICD-10-CM

## 2021-10-25 DIAGNOSIS — J20.8 ACUTE BACTERIAL BRONCHITIS: ICD-10-CM

## 2021-10-25 DIAGNOSIS — I10 ESSENTIAL HYPERTENSION: ICD-10-CM

## 2021-10-25 DIAGNOSIS — J40 BRONCHITIS: ICD-10-CM

## 2021-10-25 PROCEDURE — 99214 OFFICE O/P EST MOD 30 MIN: CPT | Performed by: INTERNAL MEDICINE

## 2021-10-25 RX ORDER — BENZONATATE 200 MG/1
200 CAPSULE ORAL 3 TIMES DAILY PRN
Qty: 20 CAPSULE | Refills: 0 | Status: SHIPPED | OUTPATIENT
Start: 2021-10-25 | End: 2021-11-18

## 2021-10-25 RX ORDER — AMLODIPINE BESYLATE 10 MG/1
5 TABLET ORAL DAILY
Start: 2021-10-25 | End: 2021-11-29

## 2021-10-25 RX ORDER — HYDROCHLOROTHIAZIDE 25 MG/1
12.5 TABLET ORAL DAILY
Qty: 30 TABLET | Refills: 1 | Status: SHIPPED | OUTPATIENT
Start: 2021-10-25 | End: 2021-11-22 | Stop reason: SINTOL

## 2021-10-25 RX ORDER — FLUOXETINE 10 MG/1
10 CAPSULE ORAL DAILY
Qty: 30 CAPSULE | Refills: 1 | Status: SHIPPED | OUTPATIENT
Start: 2021-10-25

## 2021-10-25 RX ORDER — HYDROCHLOROTHIAZIDE 25 MG/1
25 TABLET ORAL DAILY
Qty: 30 TABLET | Refills: 1 | Status: SHIPPED | OUTPATIENT
Start: 2021-10-25 | End: 2021-10-25

## 2021-10-25 RX ORDER — ALBUTEROL SULFATE 90 UG/1
2 AEROSOL, METERED RESPIRATORY (INHALATION) EVERY 6 HOURS PRN
Qty: 18 G | Refills: 0 | Status: SHIPPED | OUTPATIENT
Start: 2021-10-25 | End: 2021-11-09 | Stop reason: SDUPTHER

## 2021-10-26 ENCOUNTER — TELEPHONE (OUTPATIENT)
Dept: OBGYN CLINIC | Facility: HOSPITAL | Age: 76
End: 2021-10-26

## 2021-11-05 ENCOUNTER — TELEPHONE (OUTPATIENT)
Dept: INTERNAL MEDICINE CLINIC | Facility: CLINIC | Age: 76
End: 2021-11-05

## 2021-11-08 ENCOUNTER — TELEPHONE (OUTPATIENT)
Dept: INTERNAL MEDICINE CLINIC | Facility: CLINIC | Age: 76
End: 2021-11-08

## 2021-11-09 ENCOUNTER — OFFICE VISIT (OUTPATIENT)
Dept: INTERNAL MEDICINE CLINIC | Facility: CLINIC | Age: 76
End: 2021-11-09
Payer: MEDICARE

## 2021-11-09 VITALS
SYSTOLIC BLOOD PRESSURE: 156 MMHG | RESPIRATION RATE: 20 BRPM | OXYGEN SATURATION: 97 % | TEMPERATURE: 98.6 F | WEIGHT: 144.4 LBS | BODY MASS INDEX: 29.11 KG/M2 | HEIGHT: 59 IN | HEART RATE: 68 BPM | DIASTOLIC BLOOD PRESSURE: 82 MMHG

## 2021-11-09 DIAGNOSIS — R06.02 SOB (SHORTNESS OF BREATH): Primary | ICD-10-CM

## 2021-11-09 DIAGNOSIS — B96.89 ACUTE BACTERIAL BRONCHITIS: ICD-10-CM

## 2021-11-09 DIAGNOSIS — R05.9 COUGH: ICD-10-CM

## 2021-11-09 DIAGNOSIS — J20.8 ACUTE BACTERIAL BRONCHITIS: ICD-10-CM

## 2021-11-09 PROBLEM — J40 BRONCHITIS: Status: RESOLVED | Noted: 2021-10-20 | Resolved: 2021-11-09

## 2021-11-09 PROBLEM — N64.4 ACUTE BREAST PAIN: Status: RESOLVED | Noted: 2021-09-30 | Resolved: 2021-11-09

## 2021-11-09 PROCEDURE — 94640 AIRWAY INHALATION TREATMENT: CPT

## 2021-11-09 PROCEDURE — 99214 OFFICE O/P EST MOD 30 MIN: CPT | Performed by: INTERNAL MEDICINE

## 2021-11-09 RX ORDER — PROMETHAZINE HYDROCHLORIDE AND CODEINE PHOSPHATE 6.25; 1 MG/5ML; MG/5ML
5 SYRUP ORAL EVERY 4 HOURS PRN
Qty: 118 ML | Refills: 0 | Status: SHIPPED | OUTPATIENT
Start: 2021-11-09 | End: 2021-11-18

## 2021-11-09 RX ORDER — LEVALBUTEROL INHALATION SOLUTION 1.25 MG/3ML
1.25 SOLUTION RESPIRATORY (INHALATION) EVERY 8 HOURS PRN
Status: SHIPPED | OUTPATIENT
Start: 2021-11-09

## 2021-11-09 RX ORDER — ALBUTEROL SULFATE 90 UG/1
2 AEROSOL, METERED RESPIRATORY (INHALATION) EVERY 6 HOURS PRN
Qty: 18 G | Refills: 0 | Status: SHIPPED | OUTPATIENT
Start: 2021-11-09

## 2021-11-09 RX ORDER — PREDNISONE 20 MG/1
40 TABLET ORAL DAILY
Qty: 10 TABLET | Refills: 0 | Status: SHIPPED | OUTPATIENT
Start: 2021-11-09 | End: 2021-11-14

## 2021-11-09 RX ADMIN — LEVALBUTEROL INHALATION SOLUTION 1.25 MG: 1.25 SOLUTION RESPIRATORY (INHALATION) at 11:07

## 2021-11-16 ENCOUNTER — CONSULT (OUTPATIENT)
Dept: SURGERY | Facility: CLINIC | Age: 76
End: 2021-11-16
Payer: MEDICARE

## 2021-11-16 VITALS
WEIGHT: 144 LBS | HEIGHT: 59 IN | BODY MASS INDEX: 29.03 KG/M2 | SYSTOLIC BLOOD PRESSURE: 146 MMHG | HEART RATE: 70 BPM | DIASTOLIC BLOOD PRESSURE: 82 MMHG | TEMPERATURE: 98 F

## 2021-11-16 DIAGNOSIS — N64.4 BREAST PAIN, RIGHT: Primary | ICD-10-CM

## 2021-11-16 PROCEDURE — 99204 OFFICE O/P NEW MOD 45 MIN: CPT | Performed by: SPECIALIST

## 2021-11-18 ENCOUNTER — OFFICE VISIT (OUTPATIENT)
Dept: INTERNAL MEDICINE CLINIC | Facility: CLINIC | Age: 76
End: 2021-11-18
Payer: MEDICARE

## 2021-11-18 VITALS
WEIGHT: 141 LBS | TEMPERATURE: 98 F | HEART RATE: 86 BPM | HEIGHT: 59 IN | OXYGEN SATURATION: 99 % | BODY MASS INDEX: 28.43 KG/M2 | SYSTOLIC BLOOD PRESSURE: 140 MMHG | DIASTOLIC BLOOD PRESSURE: 82 MMHG

## 2021-11-18 DIAGNOSIS — H65.93 FLUID LEVEL BEHIND TYMPANIC MEMBRANE OF BOTH EARS: Primary | ICD-10-CM

## 2021-11-18 PROCEDURE — 99213 OFFICE O/P EST LOW 20 MIN: CPT | Performed by: NURSE PRACTITIONER

## 2021-11-22 ENCOUNTER — HOSPITAL ENCOUNTER (OUTPATIENT)
Dept: NON INVASIVE DIAGNOSTICS | Facility: HOSPITAL | Age: 76
Discharge: HOME | End: 2021-11-22
Attending: INTERNAL MEDICINE
Payer: MEDICARE

## 2021-11-22 ENCOUNTER — OFFICE VISIT (OUTPATIENT)
Dept: INTERNAL MEDICINE CLINIC | Facility: CLINIC | Age: 76
End: 2021-11-22
Payer: MEDICARE

## 2021-11-22 VITALS
BODY MASS INDEX: 28.43 KG/M2 | OXYGEN SATURATION: 98 % | SYSTOLIC BLOOD PRESSURE: 154 MMHG | WEIGHT: 141 LBS | HEART RATE: 82 BPM | RESPIRATION RATE: 18 BRPM | HEIGHT: 59 IN | DIASTOLIC BLOOD PRESSURE: 78 MMHG | TEMPERATURE: 97.9 F

## 2021-11-22 VITALS
DIASTOLIC BLOOD PRESSURE: 82 MMHG | SYSTOLIC BLOOD PRESSURE: 146 MMHG | HEART RATE: 75 BPM | HEIGHT: 59 IN | WEIGHT: 144 LBS | BODY MASS INDEX: 29.03 KG/M2

## 2021-11-22 DIAGNOSIS — R09.89 RALES: ICD-10-CM

## 2021-11-22 DIAGNOSIS — I10 ESSENTIAL HYPERTENSION: ICD-10-CM

## 2021-11-22 DIAGNOSIS — R60.0 PEDAL EDEMA: ICD-10-CM

## 2021-11-22 DIAGNOSIS — I65.21 STENOSIS OF RIGHT CAROTID ARTERY: ICD-10-CM

## 2021-11-22 DIAGNOSIS — J40 BRONCHITIS: Primary | ICD-10-CM

## 2021-11-22 PROBLEM — J20.8 ACUTE BACTERIAL BRONCHITIS: Status: RESOLVED | Noted: 2021-11-09 | Resolved: 2021-11-22

## 2021-11-22 PROBLEM — B96.89 ACUTE BACTERIAL BRONCHITIS: Status: RESOLVED | Noted: 2021-11-09 | Resolved: 2021-11-22

## 2021-11-22 LAB
AORTIC ROOT: 2.9 CM
APICAL FOUR CHAMBER EJECTION FRACTION: 72 %
E WAVE DECELERATION TIME: 198 MS
FRACTIONAL SHORTENING: 46 % (ref 28–44)
INTERVENTRICULAR SEPTUM IN DIASTOLE (PARASTERNAL SHORT AXIS VIEW): 1.2 CM
LEFT ATRIUM AREA SYSTOLE SINGLE PLANE A4C: 18.1 CM2
LEFT INTERNAL DIMENSION IN SYSTOLE: 2.1 CM (ref 2.1–4)
LEFT VENTRICULAR INTERNAL DIMENSION IN DIASTOLE: 3.9 CM (ref 3.83–5.7)
LEFT VENTRICULAR POSTERIOR WALL IN END DIASTOLE: 1.1 CM
LEFT VENTRICULAR STROKE VOLUME: 49 ML
MV E'TISSUE VEL-SEP: 7 CM/S
MV PEAK A VEL: 1.12 M/S
MV PEAK E VEL: 102 CM/S
MV STENOSIS PRESSURE HALF TIME: 0 MS
RIGHT ATRIUM AREA SYSTOLE A4C: 10.5 CM2
RIGHT VENTRICLE ID DIMENSION: 2 CM
RV PSP: 35 MMHG
SL CV LV EF: 60
SL CV PED ECHO LEFT VENTRICLE DIASTOLIC VOLUME (MOD BIPLANE) 2D: 64 ML
SL CV PED ECHO LEFT VENTRICLE SYSTOLIC VOLUME (MOD BIPLANE) 2D: 15 ML
TRICUSPID VALVE PEAK REGURGITATION VELOCITY: 2.74 M/S
TRICUSPID VALVE S': 1 CM/S
TV PEAK GRADIENT: 30 MMHG
Z-SCORE OF LEFT VENTRICULAR DIMENSION IN END SYSTOLE: -1.8

## 2021-11-22 PROCEDURE — 93306 TTE W/DOPPLER COMPLETE: CPT | Performed by: INTERNAL MEDICINE

## 2021-11-22 PROCEDURE — 94640 AIRWAY INHALATION TREATMENT: CPT

## 2021-11-22 PROCEDURE — 93306 TTE W/DOPPLER COMPLETE: CPT

## 2021-11-22 PROCEDURE — 99213 OFFICE O/P EST LOW 20 MIN: CPT | Performed by: INTERNAL MEDICINE

## 2021-11-22 RX ORDER — PREDNISONE 20 MG/1
TABLET ORAL
Qty: 19 TABLET | Refills: 0 | Status: SHIPPED | OUTPATIENT
Start: 2021-11-22 | End: 2021-12-06

## 2021-11-22 RX ORDER — CEPHALEXIN 500 MG/1
500 CAPSULE ORAL EVERY 8 HOURS SCHEDULED
Qty: 21 CAPSULE | Refills: 0 | Status: SHIPPED | OUTPATIENT
Start: 2021-11-22 | End: 2021-11-29

## 2021-11-22 RX ADMIN — LEVALBUTEROL INHALATION SOLUTION 1.25 MG: 1.25 SOLUTION RESPIRATORY (INHALATION) at 11:30

## 2021-11-23 ENCOUNTER — APPOINTMENT (OUTPATIENT)
Dept: LAB | Facility: IMAGING CENTER | Age: 76
End: 2021-11-23
Payer: MEDICARE

## 2021-11-23 DIAGNOSIS — I10 ESSENTIAL HYPERTENSION: ICD-10-CM

## 2021-11-23 LAB
ANION GAP SERPL CALCULATED.3IONS-SCNC: 9 MMOL/L (ref 4–13)
BUN SERPL-MCNC: 18 MG/DL (ref 5–25)
CALCIUM SERPL-MCNC: 9.5 MG/DL (ref 8.3–10.1)
CHLORIDE SERPL-SCNC: 108 MMOL/L (ref 100–108)
CO2 SERPL-SCNC: 24 MMOL/L (ref 21–32)
CREAT SERPL-MCNC: 1.1 MG/DL (ref 0.6–1.3)
GFR SERPL CREATININE-BSD FRML MDRD: 48 ML/MIN/1.73SQ M
GLUCOSE P FAST SERPL-MCNC: 118 MG/DL (ref 65–99)
POTASSIUM SERPL-SCNC: 4 MMOL/L (ref 3.5–5.3)
SODIUM SERPL-SCNC: 141 MMOL/L (ref 136–145)

## 2021-11-23 PROCEDURE — 36415 COLL VENOUS BLD VENIPUNCTURE: CPT

## 2021-11-23 PROCEDURE — 80048 BASIC METABOLIC PNL TOTAL CA: CPT

## 2021-11-29 ENCOUNTER — OFFICE VISIT (OUTPATIENT)
Dept: OBGYN CLINIC | Facility: HOSPITAL | Age: 76
End: 2021-11-29
Payer: MEDICARE

## 2021-11-29 ENCOUNTER — OFFICE VISIT (OUTPATIENT)
Dept: INTERNAL MEDICINE CLINIC | Facility: CLINIC | Age: 76
End: 2021-11-29
Payer: MEDICARE

## 2021-11-29 ENCOUNTER — HOSPITAL ENCOUNTER (OUTPATIENT)
Dept: RADIOLOGY | Facility: HOSPITAL | Age: 76
Discharge: HOME | End: 2021-11-29
Payer: MEDICARE

## 2021-11-29 VITALS
OXYGEN SATURATION: 96 % | WEIGHT: 145.2 LBS | DIASTOLIC BLOOD PRESSURE: 94 MMHG | HEART RATE: 74 BPM | RESPIRATION RATE: 20 BRPM | TEMPERATURE: 98.6 F | BODY MASS INDEX: 29.27 KG/M2 | SYSTOLIC BLOOD PRESSURE: 158 MMHG | HEIGHT: 59 IN

## 2021-11-29 VITALS — DIASTOLIC BLOOD PRESSURE: 89 MMHG | HEART RATE: 75 BPM | SYSTOLIC BLOOD PRESSURE: 179 MMHG

## 2021-11-29 DIAGNOSIS — M70.62 TROCHANTERIC BURSITIS OF LEFT HIP: Primary | ICD-10-CM

## 2021-11-29 DIAGNOSIS — M25.552 PAIN IN LEFT HIP: ICD-10-CM

## 2021-11-29 DIAGNOSIS — I10 ESSENTIAL HYPERTENSION: Primary | ICD-10-CM

## 2021-11-29 DIAGNOSIS — J40 BRONCHITIS: ICD-10-CM

## 2021-11-29 PROCEDURE — 99213 OFFICE O/P EST LOW 20 MIN: CPT | Performed by: INTERNAL MEDICINE

## 2021-11-29 PROCEDURE — 20610 DRAIN/INJ JOINT/BURSA W/O US: CPT | Performed by: PHYSICIAN ASSISTANT

## 2021-11-29 PROCEDURE — 99213 OFFICE O/P EST LOW 20 MIN: CPT | Performed by: PHYSICIAN ASSISTANT

## 2021-11-29 PROCEDURE — 73502 X-RAY EXAM HIP UNI 2-3 VIEWS: CPT

## 2021-11-29 RX ORDER — IRBESARTAN 150 MG/1
150 TABLET ORAL DAILY
Qty: 30 TABLET | Refills: 0 | Status: SHIPPED | OUTPATIENT
Start: 2021-11-29 | End: 2021-11-29

## 2021-11-29 RX ORDER — MOMETASONE FUROATE 110 UG/1
2 INHALANT RESPIRATORY (INHALATION) EVERY EVENING
Qty: 1 EACH | Refills: 0 | Status: SHIPPED | COMMUNITY
Start: 2021-11-29

## 2021-11-29 RX ORDER — LIDOCAINE HYDROCHLORIDE 10 MG/ML
2 INJECTION, SOLUTION INFILTRATION; PERINEURAL
Status: COMPLETED | OUTPATIENT
Start: 2021-11-29 | End: 2021-11-29

## 2021-11-29 RX ORDER — VALSARTAN 80 MG/1
80 TABLET ORAL DAILY
Qty: 90 TABLET | Refills: 0 | Status: SHIPPED | OUTPATIENT
Start: 2021-11-29

## 2021-11-29 RX ORDER — BUPIVACAINE HYDROCHLORIDE 5 MG/ML
2 INJECTION, SOLUTION EPIDURAL; INTRACAUDAL
Status: COMPLETED | OUTPATIENT
Start: 2021-11-29 | End: 2021-11-29

## 2021-11-29 RX ORDER — AMLODIPINE BESYLATE 10 MG/1
10 TABLET ORAL DAILY
Start: 2021-11-29 | End: 2021-12-10 | Stop reason: SDUPTHER

## 2021-11-29 RX ORDER — TRIAMCINOLONE ACETONIDE 40 MG/ML
80 INJECTION, SUSPENSION INTRA-ARTICULAR; INTRAMUSCULAR
Status: COMPLETED | OUTPATIENT
Start: 2021-11-29 | End: 2021-11-29

## 2021-11-29 RX ADMIN — BUPIVACAINE HYDROCHLORIDE 2 ML: 5 INJECTION, SOLUTION EPIDURAL; INTRACAUDAL at 14:00

## 2021-11-29 RX ADMIN — TRIAMCINOLONE ACETONIDE 80 MG: 40 INJECTION, SUSPENSION INTRA-ARTICULAR; INTRAMUSCULAR at 14:00

## 2021-11-29 RX ADMIN — LIDOCAINE HYDROCHLORIDE 2 ML: 10 INJECTION, SOLUTION INFILTRATION; PERINEURAL at 14:00

## 2021-12-10 DIAGNOSIS — I10 ESSENTIAL HYPERTENSION: ICD-10-CM

## 2021-12-10 RX ORDER — AMLODIPINE BESYLATE 10 MG/1
10 TABLET ORAL DAILY
Qty: 90 TABLET | Refills: 1 | Status: SHIPPED | OUTPATIENT
Start: 2021-12-10

## 2021-12-21 ENCOUNTER — TELEPHONE (OUTPATIENT)
Dept: OBGYN CLINIC | Facility: HOSPITAL | Age: 76
End: 2021-12-21

## 2021-12-22 ENCOUNTER — TELEMEDICINE (OUTPATIENT)
Dept: INTERNAL MEDICINE CLINIC | Facility: OTHER | Age: 76
End: 2021-12-22
Payer: MEDICARE

## 2021-12-22 VITALS
BODY MASS INDEX: 29.23 KG/M2 | TEMPERATURE: 98.1 F | HEART RATE: 83 BPM | DIASTOLIC BLOOD PRESSURE: 94 MMHG | HEIGHT: 59 IN | WEIGHT: 145 LBS | SYSTOLIC BLOOD PRESSURE: 164 MMHG

## 2021-12-22 DIAGNOSIS — Z20.822 EXPOSURE TO COVID-19 VIRUS: Primary | ICD-10-CM

## 2021-12-22 PROCEDURE — U0003 INFECTIOUS AGENT DETECTION BY NUCLEIC ACID (DNA OR RNA); SEVERE ACUTE RESPIRATORY SYNDROME CORONAVIRUS 2 (SARS-COV-2) (CORONAVIRUS DISEASE [COVID-19]), AMPLIFIED PROBE TECHNIQUE, MAKING USE OF HIGH THROUGHPUT TECHNOLOGIES AS DESCRIBED BY CMS-2020-01-R: HCPCS | Performed by: NURSE PRACTITIONER

## 2021-12-22 PROCEDURE — U0005 INFEC AGEN DETEC AMPLI PROBE: HCPCS | Performed by: NURSE PRACTITIONER

## 2021-12-22 PROCEDURE — 99441 PR PHYS/QHP TELEPHONE EVALUATION 5-10 MIN: CPT | Performed by: NURSE PRACTITIONER

## 2021-12-28 DIAGNOSIS — J20.8 ACUTE BACTERIAL BRONCHITIS: ICD-10-CM

## 2021-12-28 DIAGNOSIS — B96.89 ACUTE BACTERIAL BRONCHITIS: ICD-10-CM

## 2022-01-04 ENCOUNTER — TELEPHONE (OUTPATIENT)
Dept: PSYCHIATRY | Facility: CLINIC | Age: 77
End: 2022-01-04

## 2022-01-04 NOTE — TELEPHONE ENCOUNTER
Pt LVM requesting to cancel her appointment on 02/01/2022   Kaia Alcaraz is moving out of [place] the end of this month and will not need our services any longer

## 2022-02-22 RX ORDER — ALBUTEROL SULFATE 90 UG/1
AEROSOL, METERED RESPIRATORY (INHALATION)
Qty: 8.5 G | OUTPATIENT
Start: 2022-02-22

## 2022-03-02 ENCOUNTER — TELEPHONE (OUTPATIENT)
Dept: CARDIOLOGY CLINIC | Facility: CLINIC | Age: 77
End: 2022-03-02

## 2022-03-25 ENCOUNTER — TELEPHONE (OUTPATIENT)
Dept: INTERNAL MEDICINE CLINIC | Facility: OTHER | Age: 77
End: 2022-03-25

## 2022-04-14 ENCOUNTER — TELEPHONE (OUTPATIENT)
Dept: OTHER | Facility: OTHER | Age: 77
End: 2022-04-14

## 2022-04-14 NOTE — TELEPHONE ENCOUNTER
[email or web address] of [facility] would like a call back regarding a request she sent to the office for Medical records to be faxed as the patient moved back to [place] [facility]

## 2022-08-27 NOTE — H&P
ASSESSMENT/PLAN:    Assessment:   Right ring trigger finger  Right long trigger finger  Right small trigger finger    Plan:   Trigger Finger Release  right long, ring, and small finger (under local)    Follow Up: After Surgery    To Do Next Visit:  Sutures out      Operative Discussions:     Trigger Finger Release: The anatomy and physiology of trigger finger was discussed with the patient today in the office  Edema and increased contact pressure within the flexor tendons at the A1 pulley can cause pain, crepitation, and limitation of function  Treatment options include resting MP blocking splints to decrease edema, oral anti-inflammatory medications, home or formal therapy exercises, up to 2 steroid injections or surgical release  While majority of patients do respond to conservative treatment, up to 20% may require surgical release  The patient has elected release of the trigger finger  The patient has elected to undergo a release of the A1 pulley (trigger finger)  A small incision will be made over the palmar aspect of the hand, the tendon sheath holding the flexor tendons will be released  In the postoperative period, light activities are allowed immediately, driving is allowed when narcotic medication has stopped, and the incision may get wet after 2 days  Heavy activities (lifting more than approximately 10 pounds) will be allowed after the follow up appointment in 1-2 weeks  While the pain and discomfort within the wrist typically improves rapidly, some residual discomfort may be present for up to 6 weeks  The nodule that is typically palpable in the palmar aspect of the hand will not be removed, as this would necessitate removal of a portion of the flexor tendon, however the catching, clicking, and locking should resolve  Approximate success rate is 98%  The risks and benefits of the procedure were explained to the patient, which include, but are not limited to: Bleeding, infection, recurrence, Sharon Yeager was seen and treated in our emergency department on 8/27/2022. He may return to school on 08/30/2022. No school or sports until wound culture is returned. If you have any questions or concerns, please don't hesitate to call.       José Zuleta, APRN - CNP pain, scar, damage to tendons, damage to nerves, and damage to blood vessels, need for future surgery and complications related to anesthesia  If bony work is done, risks also include malunion and nonunion  These risks, along with alternative conservative treatment options, and postoperative protocols were voiced back and understood by the patient  All questions were answered to the patient's satisfaction  The patient agrees to comply with a standard postoperative protocol, and is willing to proceed  Education was provided via written and auditory forms  There were no barriers to learning  Written handouts regarding wound care, incision and scar care, and general preoperative information, as well as risks and benefits were provided to the patient       _____________________________________________________  CHIEF COMPLAINT:  Chief Complaint   Patient presents with    Right Ring Finger - Follow-up     TF- 1st injection 2/5/21         SUBJECTIVE:  Lisbet Deras is a 66 y o  female who presents for follow up regarding Trigger Finger  right  ring finger  Since last visit, Lisbet Dersa has tried steroid injections without relief  Today there is Catching and Locking to the right ring finger      Radiation: None  Associated symptoms: Catching and Locking  Handedness: right  Work status: retired    PAST MEDICAL HISTORY:  Past Medical History:   Diagnosis Date    Benign paroxysmal positional vertigo due to bilateral vestibular disorder 1/28/2020    Breast cancer ([facility] [address] ) 2007    Bilateral mastectomy with chemotherapy    Chronic maxillary sinusitis 7/20/2020    Gastroesophageal reflux disease without esophagitis 1/28/2020    Hypertension     Hypertensive disorder 5/2/2019    Pancreatitis     PONV (postoperative nausea and vomiting)     Popliteal cyst, left 6/30/2020    PUD (peptic ulcer disease) 1/28/2020    Scoliosis     Skin cancer        PAST SURGICAL HISTORY:  Past Surgical History:   Procedure Laterality Date    BASAL CELL CARCINOMA EXCISION      nose    BREAST BIOPSY      BREAST IMPLANT Bilateral     BREAST SURGERY      double mastectomy    CARPAL TUNNEL RELEASE      CATARACT EXTRACTION      DILATION AND CURETTAGE OF UTERUS      GANGLION CYST EXCISION Left     wrist    HERNIA REPAIR      HIP SURGERY      HYSTERECTOMY      KNEE ARTHROSCOPY      KNEE SURGERY      MASTECTOMY Bilateral     NASAL ENDOSCOPY W/ BALLON SINUPLASTY      x4     PLANTAR FASCIA SURGERY Bilateral     MS NASAL/SINUS ENDOSCOPY,RMV TISS MAXILL SINUS Right 2020    Procedure: FUNCTIONAL ENDOSCOPIC SINUS SURGERY (FESS) IMAGED GUIDED, MAXILLARY ANTROSTOM Y  ;SEPTOPLASTY;  Surgeon: Clara Muñiz MD;  [facility];  Service: ENT   [address]      ROTATOR CUFF REPAIR Bilateral 2004    TOTAL ABDOMINAL HYSTERECTOMY      w RSO    TRIGGER FINGER RELEASE      TUBAL LIGATION         FAMILY HISTORY:  Family History   Problem Relation Age of Onset    No Known Problems Mother     No Known Problems Father     No Known Problems Maternal Grandmother     No Known Problems Maternal Grandfather     No Known Problems Paternal Grandmother     No Known Problems Paternal Grandfather        SOCIAL HISTORY:  Social History     Tobacco Use    Smoking status: Former Smoker     Types: Cigarettes     Quit date:      Years since quittin 6    Smokeless tobacco: Never Used   Vaping Use    Vaping Use: Never used   Substance Use Topics    Alcohol use: Not Currently    Drug use: Never       MEDICATIONS:    Current Outpatient Medications:     acetaminophen (TYLENOL) 500 mg tablet, Take 1 tablet (500 mg total) by mouth every 6 (six) hours as needed for mild pain, Disp: 30 tablet, Rfl: 0    atorvastatin (LIPITOR) 10 mg tablet, Take 0 5 tablets (5 mg total) by mouth daily, Disp: 45 tablet, Rfl: 1    Bacillus Coagulans-Inulin (ALIGN PREBIOTIC-PROBIOTIC PO), Take by mouth daily, Disp: , Rfl:     calcium carbonate (OS-QIANA) 600 MG tablet, Take 600 mg by mouth 2 (two) times a day with meals, Disp: , Rfl:     cholecalciferol (VITAMIN D3) 1,000 units tablet, Take 2,000 Units by mouth daily , Disp: , Rfl:     labetalol (NORMODYNE) 100 mg tablet, Take 1 tablet (100 mg total) by mouth 2 (two) times a day, Disp: 60 tablet, Rfl: 11    latanoprost (XALATAN) 0 005 % ophthalmic solution, Administer 1 drop to both eyes daily, Disp: , Rfl:     losartan (COZAAR) 100 MG tablet, TAKE 1 TABLET(100 MG) BY MOUTH DAILY, Disp: 90 tablet, Rfl: 0    MELATONIN PO, Take 5 mg by mouth daily , Disp: , Rfl:     Multiple Vitamins-Minerals (PRESERVISION/LUTEIN PO), PreserVision Lutein, Disp: , Rfl:     venlafaxine (EFFEXOR-XR) 75 mg 24 hr capsule, Take 1 capsule (75 mg total) by mouth daily with breakfast, Disp: 30 capsule, Rfl: 3    vitamin B-12 (VITAMIN B-12) 1,000 mcg tablet, Take by mouth daily, Disp: , Rfl:     aspirin (ECOTRIN LOW STRENGTH) 81 mg EC tablet, Take 81 mg by mouth daily, Disp: , Rfl:     busPIRone (BUSPAR) 5 mg tablet, Take 1 tablet (5 mg total) by mouth 2 (two) times a day (Patient not taking: Reported on 8/25/2021), Disp: 60 tablet, Rfl: 1    Diclofenac Sodium (VOLTAREN) 1 %, as needed  (Patient not taking: Reported on 8/25/2021), Disp: , Rfl:     ALLERGIES:  Allergies   Allergen Reactions    Dorzolamide Hcl-Timolol Mal Other (See Comments)     Tongue burning    Doxazosin Shortness Of Breath    Gabapentin Other (See Comments)    Lexapro [Escitalopram] Tremor    Lorazepam Hallucinations    Benazepril Cough    Zoloft [Sertraline] Other (See Comments)     Diaphoresis and weight gain      Amlodipine Itching     Excessive wt gain    Amoxicillin-Pot Clavulanate Diarrhea    Buprenorphine Hcl Hallucinations    Carbidopa      Severe muscle spasms    Celecoxib Diarrhea    Chlorthalidone Edema     Itchiness, facial swelling, rash    Clindamycin Diarrhea    Clonidine Other (See Comments)     Burning mouth/tongue    Denosumab     Doxycycline Diarrhea and Nausea Only    Fosamax [Alendronate]      Severe stomach pain    Hydrocodone Hallucinations    Hydrocodone-Acetaminophen Hallucinations    Ibandronic Acid      Acid reflux    Levodopa      svere muscle spasms    Milnacipran      Extreme cold feeling    Pregabalin      sedation    Proline      Arthralgia myalgia    Ropinirole Headache    Rosuvastatin Itching    Tramadol Nausea Only     Severe stomach pain    Trazodone Other (See Comments)     Worsening insomnia     Zenpep [Pancrelipase (Lip-Prot-Amyl)] Diarrhea     svere    Cefdinir Diarrhea and Rash    Rotigotine Rash       REVIEW OF SYSTEMS:  Pertinent items are noted in HPI  A comprehensive review of systems was negative  LABS:  HgA1c: No results found for: HGBA1C  BMP:   Lab Results   Component Value Date    CALCIUM 9 5 07/07/2021    K 5 9 (H) 07/07/2021    CO2 21 07/07/2021     (H) 07/07/2021    BUN 35 (H) 07/07/2021    CREATININE 1 36 (H) 07/07/2021           _____________________________________________________  PHYSICAL EXAMINATION:  Vital signs: /72   Pulse 74   Ht 4' 11" (1 499 m)   Wt 63 9 kg (140 lb 12 8 oz)   BMI 28 44 kg/m²   General: well developed and well nourished, alert, oriented times 3 and appears comfortable  Psychiatric: Normal  HEENT: Trachea Midline, No torticollis  Cardiovascular: No discernable arrhythmia  Pulmonary: No wheezing or stridor  Abdomen: No rebound or guarding  Extremities: No peripheral edema  Skin: No masses, erythema, lacerations, fluctation, ulcerations  Neurovascular: Sensation Intact to the Median, Ulnar, Radial Nerve, Motor Intact to the Median, Ulnar, Radial Nerve and Pulses Intact    MUSCULOSKELETAL EXAMINATION:  right ring, long and small finger:  Positive palpable nodule over the A1 pulley  Positive tenderness to palpation over A1 pulley  Positive catching  Positive clicking  _____________________________________________________  STUDIES REVIEWED:  No Studies to review      PROCEDURES PERFORMED:  Procedures  No Procedures performed today   Scribe Attestation    I,:  Kym Barber am acting as a scribe while in the presence of the attending physician :       I,:  Soraya Stroud MD personally performed the services described in this documentation    as scribed in my presence :

## 2022-12-08 ENCOUNTER — TELEPHONE (OUTPATIENT)
Dept: INTERNAL MEDICINE CLINIC | Facility: OTHER | Age: 77
End: 2022-12-08

## 2023-07-19 DIAGNOSIS — K86.9 PANCREATIC LESION: Primary | ICD-10-CM

## (undated) DEVICE — SUCTION COAGULATOR 10FR FOOT CNTRL MEGADYNE

## (undated) DEVICE — NEURO PATTIES 1/2 X 3

## (undated) DEVICE — DRAPE SURGIKIT SADDLE BAG

## (undated) DEVICE — PROXIMATE PLUS MD MULTI-DIRECTIONAL RELEASE SKIN STAPLERS CONTAINS 35 STAINLESS STEEL STAPLES APPROXIMATE CLOSED DIMENSIONS: 6.9MM X 3.9MM WIDE: Brand: PROXIMATE

## (undated) DEVICE — PATIENT TRACKER 9734887XOM NON-INVASIVE

## (undated) DEVICE — 2000CC GUARDIAN II: Brand: GUARDIAN

## (undated) DEVICE — GLOVE SRG BIOGEL 7.5

## (undated) DEVICE — STERILE NASAL PACK: Brand: CARDINAL HEALTH

## (undated) DEVICE — NEURO PATTIES 1/2 X 1 1/2

## (undated) DEVICE — SPLINT INTRANASAL 0.6 X 2 IN POSISEP X

## (undated) DEVICE — ARISTA AH FLEXITIP XL APPLICATOR: Brand: ARISTA™ AH FLEXITIP™ XL

## (undated) DEVICE — LIGHT GLOVE GREEN

## (undated) DEVICE — MAYO STAND COVER: Brand: CONVERTORS

## (undated) DEVICE — SYRINGE 3ML LL

## (undated) DEVICE — NEEDLE SPINAL 22G X 3.5IN  QUINCKE

## (undated) DEVICE — SHEATH 1912010 5PK 4MM/30DEG STORZ XOMED: Brand: ENDO-SCRUB®

## (undated) DEVICE — INSTRUMENT TRACKER 9733533XOM ENT 1PK

## (undated) DEVICE — ANTI-FOG SOLUTION WITH FOAM PAD: Brand: DEVON

## (undated) DEVICE — BLADE 1884080EM TRICUT 4MMX13CM M4 ROHS: Brand: FUSION®

## (undated) DEVICE — SPLINT 1524050 5PK PAIR DOYLE II AIRWAY: Brand: DOYLE II ™

## (undated) DEVICE — SHEATH 1912000 5PK 4MM/0DEG STORZ XOMED: Brand: ENDO-SCRUB®

## (undated) DEVICE — NEEDLE 25G X 1 1/2

## (undated) DEVICE — TUBING 1895522 5PK STRAIGHTSHOT TO XPS: Brand: STRAIGHTSHOT®

## (undated) DEVICE — 3000CC GUARDIAN II: Brand: GUARDIAN

## (undated) DEVICE — BLADE 1882040 5PK INFERIOR TURB 2MM

## (undated) DEVICE — TOOL T12MH25 LEGEND 12CM 2.5MM MH: Brand: MIDAS REX ™

## (undated) DEVICE — SPECIMEN CONTAINER STERILE PEEL PACK

## (undated) DEVICE — TUBING SUCTION 5MM X 12 FT